# Patient Record
Sex: MALE | Race: WHITE | Employment: OTHER | URBAN - METROPOLITAN AREA
[De-identification: names, ages, dates, MRNs, and addresses within clinical notes are randomized per-mention and may not be internally consistent; named-entity substitution may affect disease eponyms.]

---

## 2017-03-02 ENCOUNTER — TELEPHONE (OUTPATIENT)
Dept: PULMONOLOGY | Facility: CLINIC | Age: 77
End: 2017-03-02

## 2017-03-02 NOTE — TELEPHONE ENCOUNTER
AR saw pt at [facility] for COPD  - told him to f/u in 1 to 2 weeks - Rn calling to schedule - no openings

## 2017-03-02 NOTE — TELEPHONE ENCOUNTER
Sched pt w/ AR on 3/15 @ 4 pm at [facility]. She was given appt time, location, & parking. Informed her that pt should bring outside records. Mary verbalized understanding.

## 2017-03-15 ENCOUNTER — TELEPHONE (OUTPATIENT)
Dept: PULMONOLOGY | Facility: CLINIC | Age: 77
End: 2017-03-15

## 2017-03-15 ENCOUNTER — OFFICE VISIT (OUTPATIENT)
Dept: PULMONOLOGY | Facility: CLINIC | Age: 77
End: 2017-03-15

## 2017-03-15 VITALS
SYSTOLIC BLOOD PRESSURE: 107 MMHG | BODY MASS INDEX: 28.11 KG/M2 | WEIGHT: 221.38 LBS | OXYGEN SATURATION: 93 % | DIASTOLIC BLOOD PRESSURE: 64 MMHG | HEART RATE: 80 BPM | HEIGHT: 74.5 IN | RESPIRATION RATE: 18 BRPM

## 2017-03-15 DIAGNOSIS — J96.11 CHRONIC RESPIRATORY FAILURE WITH HYPOXIA (HCC): ICD-10-CM

## 2017-03-15 DIAGNOSIS — J43.9 PULMONARY EMPHYSEMA, UNSPECIFIED EMPHYSEMA TYPE (HCC): Primary | ICD-10-CM

## 2017-03-15 PROCEDURE — 99214 OFFICE O/P EST MOD 30 MIN: CPT | Performed by: INTERNAL MEDICINE

## 2017-03-15 PROCEDURE — 94761 N-INVAS EAR/PLS OXIMETRY MLT: CPT | Performed by: INTERNAL MEDICINE

## 2017-03-15 PROCEDURE — G0463 HOSPITAL OUTPT CLINIC VISIT: HCPCS | Performed by: INTERNAL MEDICINE

## 2017-03-15 RX ORDER — LOSARTAN POTASSIUM 50 MG/1
TABLET ORAL
Refills: 3 | Status: ON HOLD | COMMUNITY
Start: 2017-02-13 | End: 2018-11-29

## 2017-03-15 RX ORDER — IPRATROPIUM/ALBUTEROL SULFATE 20-100 MCG
MIST INHALER (GRAM) INHALATION
Refills: 3 | COMMUNITY
Start: 2017-01-23

## 2017-03-15 RX ORDER — FLUTICASONE PROPIONATE AND SALMETEROL 50; 500 UG/1; UG/1
POWDER RESPIRATORY (INHALATION)
Refills: 3 | COMMUNITY
Start: 2017-03-08 | End: 2022-01-12 | Stop reason: ALTCHOICE

## 2017-03-15 RX ORDER — ATORVASTATIN CALCIUM 40 MG/1
TABLET, FILM COATED ORAL
Refills: 3 | COMMUNITY
Start: 2017-02-13

## 2017-03-15 RX ORDER — DOFETILIDE 0.25 MG/1
CAPSULE ORAL
Refills: 0 | Status: ON HOLD | COMMUNITY
Start: 2017-03-09 | End: 2018-11-29

## 2017-03-15 NOTE — PROGRESS NOTES
HPI:    Patient ID: Jennifer Smalls is a 69year old male.     HPI  Patient known to me I saw him in [facility] and rehab  He had severe COPD  He underwent rehab after prolonged hospitalization for pacemaker when he had a pneumothorax respiratory failure and h expiration with a few expiratory wheezes  No crackle or rhonchi   Abdominal: Bowel sounds are normal. He exhibits no distension. Musculoskeletal: He exhibits no edema. Lymphadenopathy:     He has no cervical adenopathy.    Neurological: He is oriented t

## 2017-03-27 ENCOUNTER — CARDPULM VISIT (OUTPATIENT)
Dept: CARDIAC REHAB | Facility: HOSPITAL | Age: 77
End: 2017-03-27
Attending: INTERNAL MEDICINE
Payer: MEDICARE

## 2017-03-27 DIAGNOSIS — J43.9 PULMONARY EMPHYSEMA, UNSPECIFIED EMPHYSEMA TYPE (HCC): Primary | ICD-10-CM

## 2017-03-27 DIAGNOSIS — J96.11 CHRONIC RESPIRATORY FAILURE WITH HYPOXIA (HCC): ICD-10-CM

## 2017-03-30 ENCOUNTER — CARDPULM VISIT (OUTPATIENT)
Dept: CARDIAC REHAB | Facility: HOSPITAL | Age: 77
End: 2017-03-30
Attending: INTERNAL MEDICINE
Payer: MEDICARE

## 2017-04-04 ENCOUNTER — CARDPULM VISIT (OUTPATIENT)
Dept: CARDIAC REHAB | Facility: HOSPITAL | Age: 77
End: 2017-04-04
Attending: INTERNAL MEDICINE
Payer: MEDICARE

## 2017-04-06 ENCOUNTER — CARDPULM VISIT (OUTPATIENT)
Dept: CARDIAC REHAB | Facility: HOSPITAL | Age: 77
End: 2017-04-06
Attending: INTERNAL MEDICINE
Payer: MEDICARE

## 2017-04-11 ENCOUNTER — CARDPULM VISIT (OUTPATIENT)
Dept: CARDIAC REHAB | Facility: HOSPITAL | Age: 77
End: 2017-04-11
Attending: INTERNAL MEDICINE
Payer: MEDICARE

## 2017-04-18 ENCOUNTER — CARDPULM VISIT (OUTPATIENT)
Dept: CARDIAC REHAB | Facility: HOSPITAL | Age: 77
End: 2017-04-18
Attending: INTERNAL MEDICINE
Payer: MEDICARE

## 2017-04-20 ENCOUNTER — CARDPULM VISIT (OUTPATIENT)
Dept: CARDIAC REHAB | Facility: HOSPITAL | Age: 77
End: 2017-04-20
Attending: INTERNAL MEDICINE
Payer: MEDICARE

## 2017-04-25 ENCOUNTER — CARDPULM VISIT (OUTPATIENT)
Dept: CARDIAC REHAB | Facility: HOSPITAL | Age: 77
End: 2017-04-25
Attending: INTERNAL MEDICINE
Payer: MEDICARE

## 2017-04-27 ENCOUNTER — CARDPULM VISIT (OUTPATIENT)
Dept: CARDIAC REHAB | Facility: HOSPITAL | Age: 77
End: 2017-04-27
Attending: INTERNAL MEDICINE
Payer: MEDICARE

## 2017-05-02 ENCOUNTER — CARDPULM VISIT (OUTPATIENT)
Dept: CARDIAC REHAB | Facility: HOSPITAL | Age: 77
End: 2017-05-02
Attending: INTERNAL MEDICINE
Payer: MEDICARE

## 2017-05-04 ENCOUNTER — CARDPULM VISIT (OUTPATIENT)
Dept: CARDIAC REHAB | Facility: HOSPITAL | Age: 77
End: 2017-05-04
Attending: INTERNAL MEDICINE
Payer: MEDICARE

## 2017-05-09 ENCOUNTER — CARDPULM VISIT (OUTPATIENT)
Dept: CARDIAC REHAB | Facility: HOSPITAL | Age: 77
End: 2017-05-09
Attending: INTERNAL MEDICINE
Payer: MEDICARE

## 2017-05-11 ENCOUNTER — CARDPULM VISIT (OUTPATIENT)
Dept: CARDIAC REHAB | Facility: HOSPITAL | Age: 77
End: 2017-05-11
Attending: INTERNAL MEDICINE
Payer: MEDICARE

## 2017-05-16 ENCOUNTER — CARDPULM VISIT (OUTPATIENT)
Dept: CARDIAC REHAB | Facility: HOSPITAL | Age: 77
End: 2017-05-16
Attending: INTERNAL MEDICINE
Payer: MEDICARE

## 2017-05-18 ENCOUNTER — CARDPULM VISIT (OUTPATIENT)
Dept: CARDIAC REHAB | Facility: HOSPITAL | Age: 77
End: 2017-05-18
Attending: INTERNAL MEDICINE
Payer: MEDICARE

## 2017-05-23 ENCOUNTER — CARDPULM VISIT (OUTPATIENT)
Dept: CARDIAC REHAB | Facility: HOSPITAL | Age: 77
End: 2017-05-23
Attending: INTERNAL MEDICINE
Payer: MEDICARE

## 2017-05-25 ENCOUNTER — CARDPULM VISIT (OUTPATIENT)
Dept: CARDIAC REHAB | Facility: HOSPITAL | Age: 77
End: 2017-05-25
Attending: INTERNAL MEDICINE
Payer: MEDICARE

## 2017-06-01 ENCOUNTER — CARDPULM VISIT (OUTPATIENT)
Dept: CARDIAC REHAB | Facility: HOSPITAL | Age: 77
End: 2017-06-01
Attending: INTERNAL MEDICINE
Payer: MEDICARE

## 2017-06-06 ENCOUNTER — CARDPULM VISIT (OUTPATIENT)
Dept: CARDIAC REHAB | Facility: HOSPITAL | Age: 77
End: 2017-06-06
Attending: INTERNAL MEDICINE
Payer: MEDICARE

## 2017-06-13 ENCOUNTER — CARDPULM VISIT (OUTPATIENT)
Dept: CARDIAC REHAB | Facility: HOSPITAL | Age: 77
End: 2017-06-13
Attending: INTERNAL MEDICINE
Payer: MEDICARE

## 2017-06-15 ENCOUNTER — CARDPULM VISIT (OUTPATIENT)
Dept: CARDIAC REHAB | Facility: HOSPITAL | Age: 77
End: 2017-06-15
Attending: INTERNAL MEDICINE
Payer: MEDICARE

## 2017-06-20 ENCOUNTER — CARDPULM VISIT (OUTPATIENT)
Dept: CARDIAC REHAB | Facility: HOSPITAL | Age: 77
End: 2017-06-20
Attending: INTERNAL MEDICINE
Payer: MEDICARE

## 2017-06-22 ENCOUNTER — CARDPULM VISIT (OUTPATIENT)
Dept: CARDIAC REHAB | Facility: HOSPITAL | Age: 77
End: 2017-06-22
Attending: INTERNAL MEDICINE
Payer: MEDICARE

## 2017-06-27 ENCOUNTER — CARDPULM VISIT (OUTPATIENT)
Dept: CARDIAC REHAB | Facility: HOSPITAL | Age: 77
End: 2017-06-27
Attending: INTERNAL MEDICINE
Payer: MEDICARE

## 2017-06-29 ENCOUNTER — CARDPULM VISIT (OUTPATIENT)
Dept: CARDIAC REHAB | Facility: HOSPITAL | Age: 77
End: 2017-06-29
Attending: INTERNAL MEDICINE
Payer: MEDICARE

## 2017-07-06 ENCOUNTER — CARDPULM VISIT (OUTPATIENT)
Dept: CARDIAC REHAB | Facility: HOSPITAL | Age: 77
End: 2017-07-06
Attending: INTERNAL MEDICINE
Payer: MEDICARE

## 2017-07-10 ENCOUNTER — OFFICE VISIT (OUTPATIENT)
Dept: PULMONOLOGY | Facility: CLINIC | Age: 77
End: 2017-07-10

## 2017-07-10 VITALS
TEMPERATURE: 98 F | HEART RATE: 89 BPM | SYSTOLIC BLOOD PRESSURE: 96 MMHG | BODY MASS INDEX: 27.08 KG/M2 | OXYGEN SATURATION: 93 % | DIASTOLIC BLOOD PRESSURE: 62 MMHG | WEIGHT: 211 LBS | HEIGHT: 74 IN

## 2017-07-10 DIAGNOSIS — J42 CHRONIC BRONCHITIS, UNSPECIFIED CHRONIC BRONCHITIS TYPE (HCC): Primary | ICD-10-CM

## 2017-07-10 PROCEDURE — G0463 HOSPITAL OUTPT CLINIC VISIT: HCPCS | Performed by: INTERNAL MEDICINE

## 2017-07-10 PROCEDURE — 99213 OFFICE O/P EST LOW 20 MIN: CPT | Performed by: INTERNAL MEDICINE

## 2017-07-10 RX ORDER — ASPIRIN 325 MG
325 TABLET, DELAYED RELEASE (ENTERIC COATED) ORAL
COMMUNITY
Start: 2011-08-12

## 2017-07-10 RX ORDER — CHOLECALCIFEROL (VITAMIN D3) 50 MCG
2000 TABLET ORAL
COMMUNITY

## 2017-07-10 RX ORDER — MONTELUKAST SODIUM 10 MG/1
10 TABLET ORAL
COMMUNITY
Start: 2017-03-07

## 2017-07-10 NOTE — PROGRESS NOTES
HPI:    Patient ID: Shameka Tamayo is a 69year old male.     HPI  Doing well with pulmonary rehab  Better exercise tolerance  No acute cough or sputum or fever or chills  Stable at baseline slightly better  Compliant with inhaler and nebulizers  Review of S 55-pack-year history of smoking )  H/o respiratory failure after pacemaker required hospitalization at [facility] with intubation and vent      Last chest x-ray in February / 2017 COPD no other acute finding     Advair 500/50 one puff twice a day  Nebulizers t

## 2017-07-11 ENCOUNTER — CARDPULM VISIT (OUTPATIENT)
Dept: CARDIAC REHAB | Facility: HOSPITAL | Age: 77
End: 2017-07-11
Attending: INTERNAL MEDICINE
Payer: MEDICARE

## 2017-07-18 ENCOUNTER — CARDPULM VISIT (OUTPATIENT)
Dept: CARDIAC REHAB | Facility: HOSPITAL | Age: 77
End: 2017-07-18
Attending: INTERNAL MEDICINE
Payer: MEDICARE

## 2017-07-20 ENCOUNTER — CARDPULM VISIT (OUTPATIENT)
Dept: CARDIAC REHAB | Facility: HOSPITAL | Age: 77
End: 2017-07-20
Attending: INTERNAL MEDICINE
Payer: MEDICARE

## 2017-07-25 ENCOUNTER — CARDPULM VISIT (OUTPATIENT)
Dept: CARDIAC REHAB | Facility: HOSPITAL | Age: 77
End: 2017-07-25
Attending: INTERNAL MEDICINE
Payer: MEDICARE

## 2017-07-27 ENCOUNTER — CARDPULM VISIT (OUTPATIENT)
Dept: CARDIAC REHAB | Facility: HOSPITAL | Age: 77
End: 2017-07-27
Attending: INTERNAL MEDICINE
Payer: MEDICARE

## 2017-08-01 ENCOUNTER — CARDPULM VISIT (OUTPATIENT)
Dept: CARDIAC REHAB | Facility: HOSPITAL | Age: 77
End: 2017-08-01
Attending: INTERNAL MEDICINE

## 2017-08-03 ENCOUNTER — CARDPULM VISIT (OUTPATIENT)
Dept: CARDIAC REHAB | Facility: HOSPITAL | Age: 77
End: 2017-08-03
Attending: INTERNAL MEDICINE

## 2017-08-10 ENCOUNTER — CARDPULM VISIT (OUTPATIENT)
Dept: CARDIAC REHAB | Facility: HOSPITAL | Age: 77
End: 2017-08-10
Attending: INTERNAL MEDICINE

## 2017-08-15 ENCOUNTER — CARDPULM VISIT (OUTPATIENT)
Dept: CARDIAC REHAB | Facility: HOSPITAL | Age: 77
End: 2017-08-15
Attending: INTERNAL MEDICINE

## 2017-08-17 ENCOUNTER — CARDPULM VISIT (OUTPATIENT)
Dept: CARDIAC REHAB | Facility: HOSPITAL | Age: 77
End: 2017-08-17
Attending: INTERNAL MEDICINE

## 2017-08-22 ENCOUNTER — CARDPULM VISIT (OUTPATIENT)
Dept: CARDIAC REHAB | Facility: HOSPITAL | Age: 77
End: 2017-08-22
Attending: INTERNAL MEDICINE

## 2017-08-22 ENCOUNTER — APPOINTMENT (OUTPATIENT)
Dept: CARDIAC REHAB | Facility: HOSPITAL | Age: 77
End: 2017-08-22
Attending: INTERNAL MEDICINE

## 2017-08-24 ENCOUNTER — APPOINTMENT (OUTPATIENT)
Dept: CARDIAC REHAB | Facility: HOSPITAL | Age: 77
End: 2017-08-24
Attending: INTERNAL MEDICINE

## 2017-08-29 ENCOUNTER — APPOINTMENT (OUTPATIENT)
Dept: CARDIAC REHAB | Facility: HOSPITAL | Age: 77
End: 2017-08-29
Attending: INTERNAL MEDICINE

## 2017-10-17 ENCOUNTER — CARDPULM VISIT (OUTPATIENT)
Dept: CARDIAC REHAB | Facility: HOSPITAL | Age: 77
End: 2017-10-17
Attending: INTERNAL MEDICINE

## 2018-01-08 ENCOUNTER — OFFICE VISIT (OUTPATIENT)
Dept: PULMONOLOGY | Facility: CLINIC | Age: 78
End: 2018-01-08

## 2018-01-08 VITALS
BODY MASS INDEX: 27.34 KG/M2 | DIASTOLIC BLOOD PRESSURE: 70 MMHG | HEART RATE: 89 BPM | SYSTOLIC BLOOD PRESSURE: 120 MMHG | RESPIRATION RATE: 18 BRPM | OXYGEN SATURATION: 89 % | WEIGHT: 213 LBS | HEIGHT: 74 IN

## 2018-01-08 DIAGNOSIS — J42 CHRONIC BRONCHITIS, UNSPECIFIED CHRONIC BRONCHITIS TYPE (HCC): Primary | ICD-10-CM

## 2018-01-08 DIAGNOSIS — J96.11 CHRONIC RESPIRATORY FAILURE WITH HYPOXIA (HCC): ICD-10-CM

## 2018-01-08 PROCEDURE — 99214 OFFICE O/P EST MOD 30 MIN: CPT | Performed by: INTERNAL MEDICINE

## 2018-01-08 PROCEDURE — G0463 HOSPITAL OUTPT CLINIC VISIT: HCPCS | Performed by: INTERNAL MEDICINE

## 2018-01-08 NOTE — PROGRESS NOTES
HPI:    Patient ID: Kunal Malcolm is a 69year old male.     HPI  Breathing about the same   Dyspnea  on exertion  Compliant with inhaler and nebulizers  No active cough or sputum production  No fever or chills  No hemoptysis  Stable weight  Still with acce (hcc)  (primary encounter diagnosis)  Chronic respiratory failure with hypoxia (hcc)    1–  severe COPD  / FEV1 less than 35%  Stable now /no evidence of acute exacerbation  Had flu shot September       Quit smoking 7 years  ( 55-pack-year history of smoki

## 2018-01-11 ENCOUNTER — CARDPULM VISIT (OUTPATIENT)
Dept: CARDIAC REHAB | Facility: HOSPITAL | Age: 78
End: 2018-01-11
Attending: INTERNAL MEDICINE

## 2018-03-22 ENCOUNTER — CARDPULM VISIT (OUTPATIENT)
Dept: CARDIAC REHAB | Facility: HOSPITAL | Age: 78
End: 2018-03-22
Attending: INTERNAL MEDICINE

## 2018-07-09 ENCOUNTER — OFFICE VISIT (OUTPATIENT)
Dept: PULMONOLOGY | Facility: CLINIC | Age: 78
End: 2018-07-09

## 2018-07-09 VITALS
WEIGHT: 218.81 LBS | SYSTOLIC BLOOD PRESSURE: 114 MMHG | BODY MASS INDEX: 27.78 KG/M2 | HEART RATE: 89 BPM | RESPIRATION RATE: 18 BRPM | OXYGEN SATURATION: 92 % | HEIGHT: 74.5 IN | DIASTOLIC BLOOD PRESSURE: 69 MMHG

## 2018-07-09 DIAGNOSIS — J44.1 CHRONIC OBSTRUCTIVE PULMONARY DISEASE WITH ACUTE EXACERBATION (HCC): Primary | ICD-10-CM

## 2018-07-09 DIAGNOSIS — J96.11 CHRONIC RESPIRATORY FAILURE WITH HYPOXIA (HCC): ICD-10-CM

## 2018-07-09 PROCEDURE — 99214 OFFICE O/P EST MOD 30 MIN: CPT | Performed by: INTERNAL MEDICINE

## 2018-07-09 PROCEDURE — G0463 HOSPITAL OUTPT CLINIC VISIT: HCPCS | Performed by: INTERNAL MEDICINE

## 2018-07-09 RX ORDER — PREDNISONE 10 MG/1
TABLET ORAL
Qty: 18 TABLET | Refills: 0 | Status: SHIPPED | OUTPATIENT
Start: 2018-07-09 | End: 2018-10-10 | Stop reason: ALTCHOICE

## 2018-07-09 NOTE — PROGRESS NOTES
HPI:    Patient ID: Kami Wayne is a 69year old male.     HPI  Slight increase shortness of breath from baseline with the change in of the weather and humidity  Dyspnea and exertion with less than 1 block  No cough or sputum  No fever or chills  No [place] Pulmonary/Chest: He has wheezes. He has no rales. He exhibits no tenderness. Abdominal: Bowel sounds are normal.   Musculoskeletal: He exhibits no edema or tenderness. Neurological: He is oriented to person, place, and time.               ASSESSMENT/P

## 2018-07-12 ENCOUNTER — CARDPULM VISIT (OUTPATIENT)
Dept: CARDIAC REHAB | Facility: HOSPITAL | Age: 78
End: 2018-07-12

## 2018-08-23 ENCOUNTER — APPOINTMENT (OUTPATIENT)
Dept: CARDIAC REHAB | Facility: HOSPITAL | Age: 78
End: 2018-08-23

## 2018-08-25 ENCOUNTER — TELEPHONE (OUTPATIENT)
Dept: HEMATOLOGY/ONCOLOGY | Facility: HOSPITAL | Age: 78
End: 2018-08-25

## 2018-10-10 ENCOUNTER — OFFICE VISIT (OUTPATIENT)
Dept: PULMONOLOGY | Facility: CLINIC | Age: 78
End: 2018-10-10
Payer: MEDICARE

## 2018-10-10 VITALS
BODY MASS INDEX: 27.4 KG/M2 | DIASTOLIC BLOOD PRESSURE: 75 MMHG | HEART RATE: 79 BPM | OXYGEN SATURATION: 91 % | WEIGHT: 215.81 LBS | SYSTOLIC BLOOD PRESSURE: 122 MMHG | RESPIRATION RATE: 18 BRPM | HEIGHT: 74.5 IN

## 2018-10-10 DIAGNOSIS — J42 CHRONIC BRONCHITIS, UNSPECIFIED CHRONIC BRONCHITIS TYPE (HCC): Primary | ICD-10-CM

## 2018-10-10 PROCEDURE — G0463 HOSPITAL OUTPT CLINIC VISIT: HCPCS | Performed by: INTERNAL MEDICINE

## 2018-10-10 PROCEDURE — 99213 OFFICE O/P EST LOW 20 MIN: CPT | Performed by: INTERNAL MEDICINE

## 2018-10-10 NOTE — PROGRESS NOTES
HPI:    Patient ID: Dileep Downey is a 69year old male. HPI    Review of Systems   Constitutional: Negative for fatigue, fever and unexpected weight change. Respiratory: Positive for shortness of breath and wheezing. Negative for apnea and cough. bronchitis type (hcc)  (primary encounter diagnosis)    1–   very severe COPD  / FEV1 less than 35% with chronic hypoxemic respiratory failure :  Stable now   No evidence of exacerbation now         Quit smoking 7 years  ( 55-pack-year history of smoking )

## 2018-10-25 ENCOUNTER — CARDPULM VISIT (OUTPATIENT)
Dept: CARDIAC REHAB | Facility: HOSPITAL | Age: 78
End: 2018-10-25

## 2018-11-19 ENCOUNTER — ANESTHESIA (OUTPATIENT)
Dept: MEDSURG UNIT | Facility: HOSPITAL | Age: 78
End: 2018-11-19

## 2018-11-19 ENCOUNTER — APPOINTMENT (OUTPATIENT)
Dept: CV DIAGNOSTICS | Facility: HOSPITAL | Age: 78
DRG: 208 | End: 2018-11-19
Attending: HOSPITALIST
Payer: MEDICARE

## 2018-11-19 ENCOUNTER — HOSPITAL ENCOUNTER (INPATIENT)
Facility: HOSPITAL | Age: 78
LOS: 10 days | Discharge: NURSING, REHAB OR LONG-TERM CARE | DRG: 208 | End: 2018-11-29
Attending: EMERGENCY MEDICINE | Admitting: INTERNAL MEDICINE
Payer: MEDICARE

## 2018-11-19 ENCOUNTER — APPOINTMENT (OUTPATIENT)
Dept: GENERAL RADIOLOGY | Facility: HOSPITAL | Age: 78
DRG: 208 | End: 2018-11-19
Attending: EMERGENCY MEDICINE
Payer: MEDICARE

## 2018-11-19 ENCOUNTER — ANESTHESIA EVENT (OUTPATIENT)
Dept: MEDSURG UNIT | Facility: HOSPITAL | Age: 78
End: 2018-11-19

## 2018-11-19 ENCOUNTER — APPOINTMENT (OUTPATIENT)
Dept: GENERAL RADIOLOGY | Facility: HOSPITAL | Age: 78
DRG: 208 | End: 2018-11-19
Attending: INTERNAL MEDICINE
Payer: MEDICARE

## 2018-11-19 DIAGNOSIS — J18.9 PNEUMONITIS: ICD-10-CM

## 2018-11-19 DIAGNOSIS — J44.1 COPD EXACERBATION (HCC): ICD-10-CM

## 2018-11-19 DIAGNOSIS — R06.89 HYPERCAPNIA: ICD-10-CM

## 2018-11-19 DIAGNOSIS — N17.9 AKI (ACUTE KIDNEY INJURY) (HCC): ICD-10-CM

## 2018-11-19 DIAGNOSIS — I21.4 NSTEMI (NON-ST ELEVATED MYOCARDIAL INFARCTION) (HCC): Primary | ICD-10-CM

## 2018-11-19 DIAGNOSIS — I50.9 HEART FAILURE, UNSPECIFIED HF CHRONICITY, UNSPECIFIED HEART FAILURE TYPE (HCC): ICD-10-CM

## 2018-11-19 PROBLEM — J98.4 PNEUMONITIS: Status: ACTIVE | Noted: 2018-11-19

## 2018-11-19 PROCEDURE — 71045 X-RAY EXAM CHEST 1 VIEW: CPT | Performed by: EMERGENCY MEDICINE

## 2018-11-19 PROCEDURE — 0BH17EZ INSERTION OF ENDOTRACHEAL AIRWAY INTO TRACHEA, VIA NATURAL OR ARTIFICIAL OPENING: ICD-10-PCS | Performed by: HOSPITALIST

## 2018-11-19 PROCEDURE — 93306 TTE W/DOPPLER COMPLETE: CPT | Performed by: HOSPITALIST

## 2018-11-19 PROCEDURE — 99223 1ST HOSP IP/OBS HIGH 75: CPT | Performed by: INTERNAL MEDICINE

## 2018-11-19 PROCEDURE — 5A1945Z RESPIRATORY VENTILATION, 24-96 CONSECUTIVE HOURS: ICD-10-PCS | Performed by: HOSPITALIST

## 2018-11-19 PROCEDURE — 71045 X-RAY EXAM CHEST 1 VIEW: CPT | Performed by: INTERNAL MEDICINE

## 2018-11-19 PROCEDURE — 99223 1ST HOSP IP/OBS HIGH 75: CPT | Performed by: HOSPITALIST

## 2018-11-19 RX ORDER — FUROSEMIDE 10 MG/ML
40 INJECTION INTRAMUSCULAR; INTRAVENOUS 2 TIMES DAILY
Status: COMPLETED | OUTPATIENT
Start: 2018-11-19 | End: 2018-11-19

## 2018-11-19 RX ORDER — ALBUTEROL SULFATE 2.5 MG/3ML
2.5 SOLUTION RESPIRATORY (INHALATION)
Status: DISCONTINUED | OUTPATIENT
Start: 2018-11-19 | End: 2018-11-29

## 2018-11-19 RX ORDER — FUROSEMIDE 10 MG/ML
40 INJECTION INTRAMUSCULAR; INTRAVENOUS ONCE
Status: COMPLETED | OUTPATIENT
Start: 2018-11-19 | End: 2018-11-19

## 2018-11-19 RX ORDER — MORPHINE SULFATE 4 MG/ML
4 INJECTION, SOLUTION INTRAMUSCULAR; INTRAVENOUS EVERY 2 HOUR PRN
Status: DISCONTINUED | OUTPATIENT
Start: 2018-11-19 | End: 2018-11-29

## 2018-11-19 RX ORDER — METHYLPREDNISOLONE SODIUM SUCCINATE 125 MG/2ML
60 INJECTION, POWDER, LYOPHILIZED, FOR SOLUTION INTRAMUSCULAR; INTRAVENOUS ONCE
Status: COMPLETED | OUTPATIENT
Start: 2018-11-19 | End: 2018-11-19

## 2018-11-19 RX ORDER — ACETAMINOPHEN 325 MG/1
650 TABLET ORAL EVERY 6 HOURS PRN
Status: DISCONTINUED | OUTPATIENT
Start: 2018-11-19 | End: 2018-11-29

## 2018-11-19 RX ORDER — HEPARIN SODIUM 5000 [USP'U]/ML
5000 INJECTION, SOLUTION INTRAVENOUS; SUBCUTANEOUS EVERY 12 HOURS SCHEDULED
Status: DISCONTINUED | OUTPATIENT
Start: 2018-11-19 | End: 2018-11-29

## 2018-11-19 RX ORDER — AZITHROMYCIN 250 MG/1
500 TABLET, FILM COATED ORAL ONCE
Status: COMPLETED | OUTPATIENT
Start: 2018-11-19 | End: 2018-11-19

## 2018-11-19 RX ORDER — NITROGLYCERIN 0.4 MG/1
0.4 TABLET SUBLINGUAL EVERY 5 MIN PRN
Status: DISCONTINUED | OUTPATIENT
Start: 2018-11-19 | End: 2018-11-29

## 2018-11-19 RX ORDER — ONDANSETRON 2 MG/ML
4 INJECTION INTRAMUSCULAR; INTRAVENOUS EVERY 6 HOURS PRN
Status: DISCONTINUED | OUTPATIENT
Start: 2018-11-19 | End: 2018-11-29

## 2018-11-19 RX ORDER — MORPHINE SULFATE 2 MG/ML
2 INJECTION, SOLUTION INTRAMUSCULAR; INTRAVENOUS EVERY 2 HOUR PRN
Status: DISCONTINUED | OUTPATIENT
Start: 2018-11-19 | End: 2018-11-29

## 2018-11-19 RX ORDER — METHYLPREDNISOLONE SODIUM SUCCINATE 40 MG/ML
40 INJECTION, POWDER, LYOPHILIZED, FOR SOLUTION INTRAMUSCULAR; INTRAVENOUS EVERY 6 HOURS
Status: DISCONTINUED | OUTPATIENT
Start: 2018-11-19 | End: 2018-11-20

## 2018-11-19 RX ORDER — MORPHINE SULFATE 2 MG/ML
1 INJECTION, SOLUTION INTRAMUSCULAR; INTRAVENOUS EVERY 2 HOUR PRN
Status: DISCONTINUED | OUTPATIENT
Start: 2018-11-19 | End: 2018-11-29

## 2018-11-19 RX ORDER — BISACODYL 10 MG
10 SUPPOSITORY, RECTAL RECTAL
Status: DISCONTINUED | OUTPATIENT
Start: 2018-11-19 | End: 2018-11-29

## 2018-11-19 RX ORDER — ASPIRIN 325 MG
325 TABLET ORAL DAILY
Status: DISCONTINUED | OUTPATIENT
Start: 2018-11-19 | End: 2018-11-29

## 2018-11-19 RX ORDER — IPRATROPIUM BROMIDE AND ALBUTEROL SULFATE 2.5; .5 MG/3ML; MG/3ML
3 SOLUTION RESPIRATORY (INHALATION)
Status: DISCONTINUED | OUTPATIENT
Start: 2018-11-19 | End: 2018-11-21

## 2018-11-19 RX ORDER — CHLORHEXIDINE GLUCONATE 0.12 MG/ML
15 RINSE ORAL
Status: DISCONTINUED | OUTPATIENT
Start: 2018-11-19 | End: 2018-11-22

## 2018-11-19 RX ORDER — SODIUM CHLORIDE 0.9 % (FLUSH) 0.9 %
10 SYRINGE (ML) INJECTION AS NEEDED
Status: DISCONTINUED | OUTPATIENT
Start: 2018-11-19 | End: 2018-11-29

## 2018-11-19 RX ORDER — SODIUM CHLORIDE 0.9 % (FLUSH) 0.9 %
3 SYRINGE (ML) INJECTION AS NEEDED
Status: DISCONTINUED | OUTPATIENT
Start: 2018-11-19 | End: 2018-11-29

## 2018-11-19 RX ORDER — POLYETHYLENE GLYCOL 3350 17 G/17G
17 POWDER, FOR SOLUTION ORAL DAILY PRN
Status: DISCONTINUED | OUTPATIENT
Start: 2018-11-19 | End: 2018-11-29

## 2018-11-19 RX ORDER — SODIUM CHLORIDE 9 MG/ML
INJECTION, SOLUTION INTRAVENOUS
Status: COMPLETED
Start: 2018-11-19 | End: 2018-11-19

## 2018-11-19 RX ORDER — IPRATROPIUM BROMIDE AND ALBUTEROL SULFATE 2.5; .5 MG/3ML; MG/3ML
3 SOLUTION RESPIRATORY (INHALATION) ONCE
Status: COMPLETED | OUTPATIENT
Start: 2018-11-19 | End: 2018-11-19

## 2018-11-19 RX ORDER — ASPIRIN 81 MG/1
324 TABLET, CHEWABLE ORAL ONCE
Status: COMPLETED | OUTPATIENT
Start: 2018-11-19 | End: 2018-11-19

## 2018-11-19 NOTE — ED INITIAL ASSESSMENT (HPI)
Pt brought in by [facility] from [facility] with c/o shortness of breath, per report by EMS pt was at [address] [facility] when Nurse got an 80 % oxygen saturation, per EMS they started pt on oxygen at 4L/min by nasal cannula Os sat up to 97 %, pt was give

## 2018-11-19 NOTE — PROGRESS NOTES
[facility] Pharmacy Note: Antimicrobial Weight Dose Adjustment for: piperacillin/tazobactam (Chloe Jeffrey)    Jennifer Smalls is a 69year old male who has been prescribed piperacillin/tazobactam (ZOSYN) 3.375 g IVPB now. Pt has a weight of 113kg.   Based on this criteria

## 2018-11-19 NOTE — ED PROVIDER NOTES
Patient Seen in: [facility] AND [facility] Emergency Department    History   Patient presents with:  Dyspnea LAITH SOB (respiratory)    Stated Complaint: SOB    HPI    76-year-old male with severe COPD, hyperlipidemia presents for evaluation of shortness of breath 92%   BMI 32.10 kg/m²         Physical Exam   Constitutional: He is oriented to person, place, and time. He appears well-developed and well-nourished. No distress. Appears ill, nontoxic   HENT:   Head: Normocephalic and atraumatic.    Mouth/Throat: Chloé Catching WBC 16.8 (*)     RBC 3.60 (*)     HGB 11.5 (*)     HCT 35.0 (*)     Neutrophil Absolute 14.7 (*)     Monocyte Absolute 1.1 (*)     All other components within normal limits   LACTIC ACID, PLASMA - Normal   CBC WITH DIFFERENTIAL WITH PLATELET    Narrative: disposition: 11/19/2018  1:01 PM         Review of labs from [facility] shows creatinine on 5/21/18 was 1.44, today is elevated in the threes.   I suspect this is related to heart failure, though patient does appear to have a mixed COPD versus heart failure pic

## 2018-11-19 NOTE — CONSULTS
[facility] HOSP - [facility]    Report of Cardiology Consultation    Mally Winkler Patient Status:  Inpatient    1940 MRN [id number]   Location [facility] 2W/SW Attending Rekha Nicole MD   Hosp Day # 0 PCP Marino Quintana MD     Date of Adm coronary artery disease status post PCI stent.   He had a dual chamber medtronic right sided ICD defibrillator, and a history of pafib    Past Medical History  Past Medical History:   Diagnosis Date   • COPD (chronic obstructive pulmonary disease) ([facility] [address] Activated Inhale 1 puff into the lungs daily. aspirin  MG Oral Tab EC Take 325 mg by mouth. Cholecalciferol (VITAMIN D) 2000 units Oral Tab Take 2,000 tablets by mouth. Montelukast Sodium 10 MG Oral Tab Take 10 mg by mouth.    PROAIR  (90 11/19/2018    HCT 35.0 (L) 11/19/2018     11/19/2018    CREATSERUM 3.44 (H) 11/19/2018    BUN 54 (H) 11/19/2018     (L) 11/19/2018    K 4.6 11/19/2018    CL 93 (L) 11/19/2018    CO2 27 11/19/2018     (H) 11/19/2018    CA 8.8 11/19/2018

## 2018-11-19 NOTE — CONSULTS
[facility]    Report of Consultation    Tanmay Real Patient Status:  Inpatient    1940 MRN [id number]   Location [facility] 2W/SW Attending Bradly Orona MD   Hosp Day # 0 PCP Soy Wheeler MD     Date of Admission:  6 No      Alcohol/week: 0.0 oz    Drug use: No         Current Medications:    Current Facility-Administered Medications:  aspirin tab 325 mg 325 mg Oral Daily   nitroGLYCERIN (NITROSTAT) SL tab 0.4 mg 0.4 mg Sublingual Q5 Min PRN   albuterol sulfate (VENTOL MCG Oral Cap TK ONE C PO  Q 12 H   Losartan Potassium 50 MG Oral Tab TK 1 T PO QD       Allergies  No Known Allergies    Review of Systems:    Pertinent items are noted in HPI.     Physical Exam:   Blood pressure 124/70, pulse 73, temperature 98 °F (36.7 °C with acute exacerbation   Steroid / neb / antibiotics / O2     3- acute pneumonia     4- elevated troponin   Elevated troponin   High BNP   Prior h/o CAD   Cardiology consult     5- CHF     6- obesity with likely HÉCTOR   Empiric bipap     7- DVT prophylaxis

## 2018-11-19 NOTE — PROGRESS NOTES
[facility] Pharmacy Note:  Renal Adjustment for Zosyn    Marie Ashford is a 69year old male who has been prescribed Zosyn 3.375g every 8 hrs. CrCl is estimated creatinine clearance is 20.9 mL/min (A) (based on SCr of 3.44 mg/dL (H)).  so the dose has been adjust

## 2018-11-19 NOTE — H&P
[facility]    PATIENT'S NAME: Armin Gonsaleset   ATTENDING PHYSICIAN: Niraj Hernandez MD   PATIENT ACCOUNT#:   [id number]    LOCATION:  [place] [address]  MEDICAL RECORD #:   [id number]       YOB: 1940  ADMISSION DATE:       11/19/201 cerebrovascular accident. Mother had heart failure. SOCIAL HISTORY:  Extensive tobacco use by history. Lives by himself. No alcohol or drug use. Usually independent in his basic activities of daily living.     REVIEW OF SYSTEMS:  The patient is a shilo acute on chronic renal insufficiency. Monitor his kidney function closely. Further recommendations to follow.     Dictated By Haris Monroe MD  d: 11/19/2018 13:26:34  t: 11/19/2018 13:49:37  [facility] [id number]  FB/

## 2018-11-19 NOTE — RESPIRATORY THERAPY NOTE
RESPIRATORY THERAPY PATIENT TRANSPORT NOTE    Transported from: Er-48  Transported to: 224    Bi-level support used during transport:yes

## 2018-11-20 ENCOUNTER — APPOINTMENT (OUTPATIENT)
Dept: ULTRASOUND IMAGING | Facility: HOSPITAL | Age: 78
DRG: 208 | End: 2018-11-20
Attending: INTERNAL MEDICINE
Payer: MEDICARE

## 2018-11-20 ENCOUNTER — APPOINTMENT (OUTPATIENT)
Dept: GENERAL RADIOLOGY | Facility: HOSPITAL | Age: 78
DRG: 208 | End: 2018-11-20
Attending: INTERNAL MEDICINE
Payer: MEDICARE

## 2018-11-20 ENCOUNTER — APPOINTMENT (OUTPATIENT)
Dept: PICC SERVICES | Facility: HOSPITAL | Age: 78
DRG: 208 | End: 2018-11-20
Attending: INTERNAL MEDICINE
Payer: MEDICARE

## 2018-11-20 PROCEDURE — 71045 X-RAY EXAM CHEST 1 VIEW: CPT | Performed by: INTERNAL MEDICINE

## 2018-11-20 PROCEDURE — 02HV33Z INSERTION OF INFUSION DEVICE INTO SUPERIOR VENA CAVA, PERCUTANEOUS APPROACH: ICD-10-PCS | Performed by: HOSPITALIST

## 2018-11-20 PROCEDURE — 99291 CRITICAL CARE FIRST HOUR: CPT | Performed by: INTERNAL MEDICINE

## 2018-11-20 PROCEDURE — B5181ZA FLUOROSCOPY OF SUPERIOR VENA CAVA USING LOW OSMOLAR CONTRAST, GUIDANCE: ICD-10-PCS | Performed by: HOSPITALIST

## 2018-11-20 PROCEDURE — 76770 US EXAM ABDO BACK WALL COMP: CPT | Performed by: INTERNAL MEDICINE

## 2018-11-20 RX ORDER — SODIUM CHLORIDE 9 MG/ML
INJECTION, SOLUTION INTRAVENOUS
Status: DISPENSED
Start: 2018-11-20 | End: 2018-11-21

## 2018-11-20 RX ORDER — SODIUM CHLORIDE 9 MG/ML
INJECTION, SOLUTION INTRAVENOUS CONTINUOUS
Status: DISCONTINUED | OUTPATIENT
Start: 2018-11-20 | End: 2018-11-22

## 2018-11-20 RX ORDER — 0.9 % SODIUM CHLORIDE 0.9 %
VIAL (ML) INJECTION
Status: DISPENSED
Start: 2018-11-20 | End: 2018-11-21

## 2018-11-20 RX ORDER — SODIUM CHLORIDE 9 MG/ML
INJECTION, SOLUTION INTRAVENOUS
Status: COMPLETED
Start: 2018-11-20 | End: 2018-11-20

## 2018-11-20 RX ORDER — METHYLPREDNISOLONE SODIUM SUCCINATE 40 MG/ML
40 INJECTION, POWDER, LYOPHILIZED, FOR SOLUTION INTRAMUSCULAR; INTRAVENOUS EVERY 12 HOURS
Status: DISCONTINUED | OUTPATIENT
Start: 2018-11-20 | End: 2018-11-22

## 2018-11-20 RX ORDER — FAMOTIDINE 10 MG/ML
20 INJECTION, SOLUTION INTRAVENOUS DAILY
Status: DISCONTINUED | OUTPATIENT
Start: 2018-11-20 | End: 2018-11-23

## 2018-11-20 RX ORDER — MAGNESIUM OXIDE 400 MG (241.3 MG MAGNESIUM) TABLET
400 TABLET ONCE
Status: COMPLETED | OUTPATIENT
Start: 2018-11-20 | End: 2018-11-20

## 2018-11-20 RX ORDER — SODIUM CHLORIDE 0.9 % (FLUSH) 0.9 %
10 SYRINGE (ML) INJECTION AS NEEDED
Status: DISCONTINUED | OUTPATIENT
Start: 2018-11-20 | End: 2018-11-29

## 2018-11-20 RX ORDER — DEXTROSE MONOHYDRATE 25 G/50ML
50 INJECTION, SOLUTION INTRAVENOUS AS NEEDED
Status: DISCONTINUED | OUTPATIENT
Start: 2018-11-20 | End: 2018-11-29

## 2018-11-20 RX ORDER — LIDOCAINE HYDROCHLORIDE 10 MG/ML
0.5 INJECTION, SOLUTION INFILTRATION; PERINEURAL ONCE AS NEEDED
Status: ACTIVE | OUTPATIENT
Start: 2018-11-20 | End: 2018-11-20

## 2018-11-20 NOTE — PROGRESS NOTES
[facility] Pharmacy Note:  Renal Dose Adjustment    Cassie Heart has been prescribed famotidine (PEPCID) 20 mg intravenously every 12 hours. Estimated Creatinine Clearance: 22.4 mL/min (A) (based on SCr of 3.21 mg/dL (H)).     His calculated creatinine clearan

## 2018-11-20 NOTE — DIETARY NOTE
ADULT NUTRITION INITIAL ASSESSMENT    Pt is at high nutrition risk. Pt does not meet malnutrition criteria. RECOMMENDATIONS TO MD:  See Nutrition Intervention.  MD may taper IVF accordingly as tube feeds advance to goal 70 ml/hr providing ~1475 ml H20 providers and discussed in Care Rounds   - Discharge and transfer of nutrition care to new setting or provider: monitor plans        PERTINENT PAST MEDICAL HISTORY:   Past Medical History:   Diagnosis Date   • COPD (chronic obstructive pulmonary disease) ( Monitor k+ closely for need to adjust EN formula.  .Appropriate labs entered for am.   Recent Labs      11/19/18   1029  11/20/18   0442   GLU  123*  218*   BUN  54*  61*   CREATSERUM  3.44*  3.21*   CA  8.8  7.8*   MG   --   1.6*   NA  132*  133*   K  4.6

## 2018-11-20 NOTE — CONSULTS
[facility]    Report of Consultation    Riley Alexandermike Patient Status:  Inpatient    1940 MRN [id number]   Location [facility] 2W/SW Attending Treva Zhao MD   Hosp Day # 1 PCP Isabell Weir MD     Date of Admission:  6 (ZOSYN) 4.5 g in dextrose 5 % 100 mL ADD-vantage 4.5 g Intravenous Q12H   Normal Saline Flush 0.9 % injection 3 mL 3 mL Intravenous PRN   Heparin Sodium (Porcine) 5000 UNIT/ML injection 5,000 Units 5,000 Units Subcutaneous 2 times per day   acetaminophen ( TK 1 T PO  D   ADVAIR DISKUS 500-50 MCG/DOSE Inhalation Aerosol Powder, Breath Activated INHALE 1 PUFF INTO THE LUNGS Q 12 H   COMBIVENT RESPIMAT  MCG/ACT Inhalation Aero Soln INL 1 PUFF PO INTO THE LUNGS QID   dofetilide 250 MCG Oral Cap TK ONE C PO (cpt=71045)    Result Date: 11/19/2018  CONCLUSION:  1. Successful placement of endotracheal and nasogastric tubes. 2. Mild bilateral pulmonary opacification.      Dictated by (CST): Maurice Hooks MD on 11/19/2018 at 20:27     Approved by (CST): P

## 2018-11-20 NOTE — ANESTHESIA PROCEDURE NOTES
ANESTHESIA INTUBATION  Urgency: emergent      General Information and Staff    Patient location during procedure: ICU  Anesthesiologist: Ilana Hdez MD  Performed: anesthesiologist     Consent for Airway (if performed for an anesthetic, see related d

## 2018-11-20 NOTE — CM/SW NOTE
11/20: Received MDO for home health evaluation. Patient admitted with respiratory failure, he is currently intubated and on a ventilator. SW to f/u when appropriate. Addendum 11/23/2018: Met with patient and 2 sisters Ryan Ledesma.  Per patient,

## 2018-11-20 NOTE — PLAN OF CARE
Problem: CARDIOVASCULAR - ADULT  Goal: Maintains optimal cardiac output and hemodynamic stability  INTERVENTIONS:  - Monitor vital signs, rhythm, and trends  - Monitor for bleeding, hypotension and signs of decreased cardiac output  - Evaluate effectivenes dependent on mechanical vent. No acute respiratory distress observed from pt, frequent suctioning done as pt has large amounts of thick tan sputum.     Problem: GASTROINTESTINAL - ADULT  Goal: Maintains adequate nutritional intake (undernourished)  INTERVEN

## 2018-11-20 NOTE — PROGRESS NOTES
At approximately 1800 Patient noted to be increasingly drowsy. MD's notified and ABG's were ordered. Labs were drawn and Dr Francine Arceo was notified of an increasing PCO2 level.  At approximately 7pm Dr Francine Arceo asked the RN  to contact anesthesia and have the patie

## 2018-11-20 NOTE — PROGRESS NOTES
[facility]    Progress Note    Kami Wayne Patient Status:  Inpatient    1940 MRN [id number]   Location [facility] 2W/SW Attending Chris Hewitt MD   Hosp Day # 1 PCP Paula Kumar MD        Subjective:   Subjective: HGB 10.4 (L) 11/20/2018    HCT 31.2 (L) 11/20/2018     11/20/2018    CREATSERUM 3.21 (H) 11/20/2018    BUN 61 (H) 11/20/2018     (L) 11/20/2018    K 4.3 11/20/2018    CL 92 (L) 11/20/2018    CO2 30 11/20/2018     (H) 11/20/2018    CA -------------------------- Sinus Rhythm -Prominent R(V1) and left axis -nonspecific -Seen with pulmonary disease -possible anterior fascicular block. - Nonspecific T-abnormality.  ABNORMAL No previous ECGs available Electronically signed on 11/19/2018 at 1

## 2018-11-20 NOTE — PROGRESS NOTES
[facility] HOSP - [facility]    Progress Note    Kriss Cook Patient Status:  Inpatient    1940 MRN [id number]   Location [facility] 2W/SW Attending Sandra Cho MD   Hosp Day # 1 PCP Sanna Jaramillo MD       Assessment and Plan:      1. Neck:supple,no JVD  Pulm: Lungs clear but diminished anteriorly, ventilated  CV: Heart with regular rate and rhythm, nl S1,S2 ,no murmur  Abd: Abdomen soft, nontender, nondistended,   Ext:  no clubbing, no cyanosis,no edema, SCDs  Neuro: no focal deficit endotracheal and nasogastric tubes. 2. Mild bilateral pulmonary opacification.      Dictated by (CST): Raeann Hooks MD on 11/19/2018 at 20:27     Approved by (CST): Raeann Hooks MD on 11/19/2018 at 20:29          Xr Chest Ap Portable

## 2018-11-20 NOTE — PROGRESS NOTES
Vascular Access Note  Inserted by Salo Minor RN    Vascular Access Screening:   Allergies to Lidocaine: no  Allergies to Latex: no  Presence of Pacemaker/Defibrillator: Right Side  Mastectomy with Lymph Node Dissection: No  AV Fistula / AV Graft: No  Dialysis

## 2018-11-20 NOTE — PLAN OF CARE
Problem: Patient Centered Care  Goal: Patient preferences are identified and integrated in the patient's plan of care  Interventions:  - What would you like us to know as we care for you?  Pt lives at home alone, sister checks in on him  - Provide timely, c vital signs, rhythm, and trends  - Monitor for bleeding, hypotension and signs of decreased cardiac output  - Evaluate effectiveness of vasoactive medications to optimize hemodynamic stability  - Monitor arterial and/or venous puncture sites for bleeding a wheezes. Receiving nebulizer treatments.      Problem: GASTROINTESTINAL - ADULT  Goal: Maintains adequate nutritional intake (undernourished)  INTERVENTIONS:  - Monitor percentage of each meal consumed  - Identify factors contributing to decreased intake, t

## 2018-11-21 ENCOUNTER — APPOINTMENT (OUTPATIENT)
Dept: GENERAL RADIOLOGY | Facility: HOSPITAL | Age: 78
DRG: 208 | End: 2018-11-21
Attending: INTERNAL MEDICINE
Payer: MEDICARE

## 2018-11-21 PROCEDURE — 71045 X-RAY EXAM CHEST 1 VIEW: CPT | Performed by: INTERNAL MEDICINE

## 2018-11-21 PROCEDURE — 99291 CRITICAL CARE FIRST HOUR: CPT | Performed by: INTERNAL MEDICINE

## 2018-11-21 RX ORDER — MAGNESIUM SULFATE HEPTAHYDRATE 40 MG/ML
2 INJECTION, SOLUTION INTRAVENOUS ONCE
Status: COMPLETED | OUTPATIENT
Start: 2018-11-21 | End: 2018-11-21

## 2018-11-21 RX ORDER — IPRATROPIUM BROMIDE AND ALBUTEROL SULFATE 2.5; .5 MG/3ML; MG/3ML
3 SOLUTION RESPIRATORY (INHALATION)
Status: DISCONTINUED | OUTPATIENT
Start: 2018-11-21 | End: 2018-11-27

## 2018-11-21 NOTE — PROGRESS NOTES
[facility] Pharmacy Note:  Renal Dose Adjustment    Zosyn (piperacillin/tazobactam) dosing had been previously adjusted by pharmacy due to renal insufficiency for Benoit Montanez. Estimated Creatinine Clearance: 31.1 mL/min (A) (based on SCr of 2.31 mg/dL (H)).

## 2018-11-21 NOTE — PROGRESS NOTES
[facility] Pharmacy Note:  Antibiotic Dose Adjustment    Zithromax (azithromycin) 250 mg IV q24h was ordered for Ahsan Osman by Dr. Sim Mercado. Body mass index is 27.31 kg/m².   Wt Readings from Last 6 Encounters:  11/21/18 : 96.5 kg (212 lb 11.2 oz)  10/10/18 :

## 2018-11-21 NOTE — PROGRESS NOTES
[facility] HOSP - [facility]    Progress Note    Sydni Cali Patient Status:  Inpatient    1940 MRN [id number]   Location [facility] 2W/SW Attending Judy Dahl MD   Hosp Day # 2 PCP Mame Grande MD       Subjective:   Sydni Cali i 11/20/2018       Xr Chest Ap Portable  (cpt=71045)    Result Date: 11/20/2018  CONCLUSION:  1. Successful placement of left PICC line into the SVC.      Dictated by (CST): Caterina Hooks MD on 11/20/2018 at 14:21     Approved by (CST): Neva Rhythm -Prominent R(V1) and left axis -nonspecific -Seen with pulmonary disease -possible anterior fascicular block. - Nonspecific T-abnormality.  ABNORMAL No previous ECGs available Electronically signed on 11/19/2018 at 15:14 by Monik Joe MD

## 2018-11-21 NOTE — RESPIRATORY THERAPY NOTE
Patient placed on CPAP 5 /PS 10 ,40% FIO2  At 0905 per DR PLASENCIA. .RR 23, ML,HR 70,O2 SAT 96%. Patient placed back on full support at 0940- patient tired,- 250 ML  ET tube advanced  and taped 25 cms at the lip.

## 2018-11-21 NOTE — PLAN OF CARE
Problem: Patient Centered Care  Goal: Patient preferences are identified and integrated in the patient's plan of care  Interventions:  - What would you like us to know as we care for you?  Pt lives at home alone, sister checks in on him  - Provide timely, c condition, hydration, nutrition and elimination needs   - Reorient and redirection as needed  - Assess for the need to continue restraints  Outcome: Not Progressing  Patient attempts to reach for tube and lines without realizing.  BUE soft wrist restraints instruct to report SOB or any respiratory difficulty  - Respiratory Therapy support as indicated  - Manage/alleviate anxiety  - Monitor for signs/symptoms of CO2 retention  Outcome: Progressing      Problem: GASTROINTESTINAL - ADULT  Goal: Maintains adequa impaired, hearing impaired and aphasic patients to use assistive/communication devices  Outcome: Not Progressing      Problem: Diabetes/Glucose Control  Goal: Glucose maintained within prescribed range  INTERVENTIONS:  - Monitor Blood Glucose as ordered  -

## 2018-11-21 NOTE — PLAN OF CARE
Problem: Patient Centered Care  Goal: Patient preferences are identified and integrated in the patient's plan of care  Interventions:  - What would you like us to know as we care for you?  Pt lives at home alone, sister checks in on him  - Provide timely, c hemodynamic stability  INTERVENTIONS:  - Monitor vital signs, rhythm, and trends  - Monitor for bleeding, hypotension and signs of decreased cardiac output  - Evaluate effectiveness of vasoactive medications to optimize hemodynamic stability  - Monitor art appropriate  - Assist with meals as needed  - Monitor I&O, WT and lab values  - Obtain nutritional consult as needed  - Optimize oral hygiene and moisture  - Encourage food from home; allow for food preferences  - Enhance eating environment  Outcome: Progr

## 2018-11-21 NOTE — PROGRESS NOTES
[facility]    Cardiology Progress Note    Shameka Tamayo Patient Status:  Inpatient    1940 MRN [id number]   Location [facility] 2W/SW Attending Jodi Elliott MD   Hosp Day # 2 PCP Art Smyth MD         Assessment and P Results:     Lab Results   Component Value Date    WBC 20.5 (H) 11/21/2018    HGB 9.9 (L) 11/21/2018    HCT 29.7 (L) 11/21/2018     11/21/2018    CREATSERUM 2.31 (H) 11/21/2018    BUN 55 (H) 11/21/2018     11/21/2018    K 4.6 11/21/201 11/19/2018 at 11:03          Ekg 12-lead    Result Date: 11/19/2018  ECG Report  Interpretation  -------------------------- Sinus Rhythm -Left axis -anterior fascicular block.  ABNORMAL When compared with ECG of 11/19/2018 10:15:51 No significant changes ha

## 2018-11-21 NOTE — PROGRESS NOTES
[facility]    Progress Note    Drema Signs Patient Status:  Inpatient    1940 MRN [id number]   Location [facility] 2W/SW Attending Alisia Apple MD   Hosp Day # 2 PCP Marina Hrone MD        Subjective:     Unable to 9.9 (L) 11/21/2018    HCT 29.7 (L) 11/21/2018     11/21/2018    CREATSERUM 2.31 (H) 11/21/2018    BUN 55 (H) 11/21/2018     11/21/2018    K 4.6 11/21/2018     11/21/2018    CO2 31 11/21/2018     (H) 11/21/2018    CA 7.5 (L) 11/21/ nodule. Followup PA and lateral chest x-ray after clearing of pneumonia recommended for further evaluation.    Dictated by (CST): Bettina Nazario MD on 11/21/2018 at 9:05     Approved by (CST): Bettina Nazario MD on 11/21/2018 at 9:11     ADDENDUM:  Ebenezer Arzola J Carlos Hooks MD on 11/19/2018 at 20:29          Xr Chest Ap Portable  (cpt=71045)    Result Date: 11/19/2018  CONCLUSION:  1. Borderline cardiomegaly. Tortuous atherosclerotic aorta. 2. Bilateral mixed alveolar and interstitial opacification.  Fernandez

## 2018-11-22 ENCOUNTER — APPOINTMENT (OUTPATIENT)
Dept: GENERAL RADIOLOGY | Facility: HOSPITAL | Age: 78
DRG: 208 | End: 2018-11-22
Attending: INTERNAL MEDICINE
Payer: MEDICARE

## 2018-11-22 PROCEDURE — 71045 X-RAY EXAM CHEST 1 VIEW: CPT | Performed by: INTERNAL MEDICINE

## 2018-11-22 PROCEDURE — 99291 CRITICAL CARE FIRST HOUR: CPT | Performed by: INTERNAL MEDICINE

## 2018-11-22 RX ORDER — METHYLPREDNISOLONE SODIUM SUCCINATE 125 MG/2ML
60 INJECTION, POWDER, LYOPHILIZED, FOR SOLUTION INTRAMUSCULAR; INTRAVENOUS EVERY 12 HOURS
Status: DISCONTINUED | OUTPATIENT
Start: 2018-11-22 | End: 2018-11-23

## 2018-11-22 RX ORDER — SODIUM CHLORIDE 9 MG/ML
INJECTION, SOLUTION INTRAVENOUS
Status: COMPLETED
Start: 2018-11-22 | End: 2018-11-22

## 2018-11-22 NOTE — PLAN OF CARE
Safety Risk - Non-Violent Restraints    • Patient will remain free from self-harm Not Progressing        Patient continues to attempt to remove medically necessary equipment - restraints continued per physician order.     Patient/family educated regarding r

## 2018-11-22 NOTE — PLAN OF CARE
Pulmonary/ICU ([facility]): repeat SBT, extubate, D/C IVF   Nephrology [facility]): agree with D/C of IVF  Cardiology (Ranjan Atkinson): continue, will defer fluid management to nephrology     Patient extubated according to telephone orders/protocol at 21 [phone], placed on 6L na

## 2018-11-22 NOTE — PROGRESS NOTES
[facility]    Progress Note    Marino Tobias Patient Status:  Inpatient    1940 MRN [id number]   Location [facility] 2W/SW Attending Yael Deng MD   Hosp Day # 3 PCP Martha Lopez MD       Assessment and Plan:      1. 11/22/18   0555   RBC  3.27*  3.11*  2.98*   HGB  10.4*  9.9*  9.4*   HCT  31.2*  29.7*  28.6*   MCV  95.2  95.4  96.1   MCH  31.8  31.8  31.6   MCHC  33.4  33.3  32.9   RDW  13.8  14.1  14.0   WBC  16.5*  20.5*  17.7*   PLT  293  296  280       Recent Lab

## 2018-11-22 NOTE — SLP NOTE
ADULT SWALLOWING EVALUATION    ASSESSMENT    ASSESSMENT/OVERALL IMPRESSION:  Orders rec'd, chart reviewed. Pt extubated following approx three day intubation. Pt seen sitting upright in chair with audible phonation with hoarse vocal quality.  Phonation was position; Slow rate;Small bites and sips; No straw  Medication Administration Recommendations: Crushed in puree  Treatment Plan/Recommendations: Aspiration precautions  Discharge Recommendations/Plan: Undetermined    HISTORY   MEDICAL HISTORY  Reason for Ref cannot be evaluated clinically.  Videofluoroscopic Swallow Study is required to rule-out silent aspiration.)    GOALS  Goal #1 The patient will tolerate puree consistency and nectar liquids without overt signs or symptoms of aspiration with 100 % accuracy o

## 2018-11-22 NOTE — PROGRESS NOTES
[facility]    Progress Note    Kayy Odin Patient Status:  Inpatient    1940 MRN [id number]   Location [facility] 2W/SW Attending Luis Enrique Wayne MD   Hosp Day # 3 PCP Nancy Patel MD        Subjective:     Unable to                             Results:     Lab Results   Component Value Date    WBC 20.5 (H) 11/21/2018    HGB 9.9 (L) 11/21/2018    HCT 29.7 (L) 11/21/2018     11/21/2018    CREATSERUM 2.31 (H) 11/21/2018    BUN 55 (H) 11/21/2018     11/21/2 in SVC. Nasogastric tube tip approximately 6 cm below the GE junction in the cardia fundus junction of stomach. CONCLUSION: Please see addendum below  1. Patchy pneumonia in the left lung base. No significant change.  2. Minimal linear bilateral perihilar consolidation.  2. Support tubes and catheters are satisfactory     Dictated by (CST): Alejo Mariano MD on 11/20/2018 at 7:43     Approved by (CST): Alejo Mariano MD on 11/20/2018 at 7:46                     Patient will require inpatient servic

## 2018-11-22 NOTE — PROGRESS NOTES
[facility]    Progress Note    Sisto Freeze Patient Status:  Inpatient    1940 MRN [id number]   Location [facility] 2W/SW Attending Dilcia Zuniga MD   Hosp Day # 3 PCP Ping Talbert MD       Subjective:   Sisto Freeze i 11/22/2018  CONCLUSION:   Lines and tubes in place and unchanged. Imaging findings suggestive of COPD and scarring, unchanged.     Dictated by (CST): Raine Degroot MD on 11/22/2018 at 7:50     Approved by (CST): Raine Degroot MD on 11/22/2018 at 7:52 Minimal linear bilateral perihilar atelectasis. 3. NG tube tip in proximal stomach-approximately 6 cm below GE junction (high in position). 4. ET tube approximately 5.5 cm above elizabeth. 5. Left PICC line tip in SVC.     ADDENDUM: Questionable 1.4 cm left mi

## 2018-11-22 NOTE — PLAN OF CARE
Problem: Safety Risk - Non-Violent Restraints  Goal: Patient will remain free from self-harm  INTERVENTIONS:  - Apply the least restrictive restraint to prevent harm  - Notify patient and family of reasons restraints applied  - Assess for any contributing respiratory difficulty  - Respiratory Therapy support as indicated  - Manage/alleviate anxiety  - Monitor for signs/symptoms of CO2 retention  Outcome: Not Progressing  Failed SBT in am, patient fully awake, sedation increased to help patient rest.  Lungs

## 2018-11-23 ENCOUNTER — APPOINTMENT (OUTPATIENT)
Dept: GENERAL RADIOLOGY | Facility: HOSPITAL | Age: 78
DRG: 208 | End: 2018-11-23
Attending: INTERNAL MEDICINE
Payer: MEDICARE

## 2018-11-23 PROCEDURE — 99233 SBSQ HOSP IP/OBS HIGH 50: CPT | Performed by: INTERNAL MEDICINE

## 2018-11-23 PROCEDURE — 71045 X-RAY EXAM CHEST 1 VIEW: CPT | Performed by: INTERNAL MEDICINE

## 2018-11-23 RX ORDER — FAMOTIDINE 20 MG/1
20 TABLET ORAL DAILY
Status: DISCONTINUED | OUTPATIENT
Start: 2018-11-24 | End: 2018-11-23

## 2018-11-23 RX ORDER — METHYLPREDNISOLONE SODIUM SUCCINATE 40 MG/ML
40 INJECTION, POWDER, LYOPHILIZED, FOR SOLUTION INTRAMUSCULAR; INTRAVENOUS EVERY 12 HOURS
Status: DISCONTINUED | OUTPATIENT
Start: 2018-11-23 | End: 2018-11-27

## 2018-11-23 RX ORDER — PANTOPRAZOLE SODIUM 40 MG/1
40 TABLET, DELAYED RELEASE ORAL
Status: DISCONTINUED | OUTPATIENT
Start: 2018-11-24 | End: 2018-11-29

## 2018-11-23 RX ORDER — SODIUM POLYSTYRENE SULFONATE 15 G/60ML
30 SUSPENSION ORAL; RECTAL ONCE
Status: COMPLETED | OUTPATIENT
Start: 2018-11-23 | End: 2018-11-23

## 2018-11-23 RX ORDER — SODIUM CHLORIDE 9 MG/ML
INJECTION, SOLUTION INTRAVENOUS
Status: DISPENSED
Start: 2018-11-23 | End: 2018-11-24

## 2018-11-23 NOTE — PROGRESS NOTES
[facility] Pharmacy Note: Route Optimization for Famotidine (PEPCID)    Patient is currently on Famotidine (PEPCID) 20 mg IV every 24 hours.    The patient meets the criteria to convert to the oral equivalent as established by the IV to Oral conversion protocol ap

## 2018-11-23 NOTE — PROGRESS NOTES
[facility] HOSP - [facility]    Cardiology Progress Note    Daniel Petty Patient Status:  Inpatient    1940 MRN [id number]   [facility] 2W/SW Attending Elias Nazario MD   [facility] Day # 4 PCP Tavon Combs MD       Daly Santana 7. 5*  7.7*  8.1*   MG   --   1.6*  1.8  2.1   --    PHOS   --    --   3.0  2.6  3.3   GLU  123*  218*  190*  167*  149*       Recent Labs   Lab  11/21/18   0425  11/22/18   0555  11/23/18   0509   ALB  2.2*  2.1*  2.4*       Recent Labs   Lab  11/19/18   1 packet 17 g 17 g Oral Daily PRN   bisacodyl (DULCOLAX) rectal suppository 10 mg 10 mg Rectal Daily PRN   morphINE sulfate (PF) 2 MG/ML injection 1 mg 1 mg Intravenous Q2H PRN   Or      morphINE sulfate (PF) 2 MG/ML injection 2 mg 2 mg Intravenous Q2H PRN

## 2018-11-23 NOTE — PROGRESS NOTES
[facility]    Progress Note    Anderw Ace Patient Status:  Inpatient    1940 MRN [id number]   Location [facility] 2W/SW Attending Tanna Triana MD   Hosp Day # 4 PCP Mari Ordonez MD       Subjective:   Andrew Ace i airspace disease.      Dictated by (CST): Wallace Jeans, MD on 11/23/2018 at 8:03     Approved by (CST): Wallace Jeans, MD on 11/23/2018 at 8:06          Xr Chest Ap Portable  (cpt=71045)    Result Date: 11/22/2018  CONCLUSION:   Lines and tubes in place an

## 2018-11-23 NOTE — PROGRESS NOTES
[facility]    Progress Note    Tanmay Real Patient Status:  Inpatient    1940 MRN [id number]   Location [facility] 2W/SW Attending Valerie German MD   Hosp Day # 4 PCP Soy Wheeler MD        Subjective:     Unable to code                            Results:     Lab Results   Component Value Date    WBC 18.6 (H) 11/23/2018    HGB 10.0 (L) 11/23/2018    HCT 31.1 (L) 11/23/2018     11/23/2018    CREATSERUM 1.60 (H) 11/23/2018    BUN 45 (H) 11/23/2018     (H)

## 2018-11-24 ENCOUNTER — APPOINTMENT (OUTPATIENT)
Dept: CT IMAGING | Facility: HOSPITAL | Age: 78
DRG: 208 | End: 2018-11-24
Attending: INTERNAL MEDICINE
Payer: MEDICARE

## 2018-11-24 PROCEDURE — 70450 CT HEAD/BRAIN W/O DYE: CPT | Performed by: INTERNAL MEDICINE

## 2018-11-24 PROCEDURE — 99233 SBSQ HOSP IP/OBS HIGH 50: CPT | Performed by: INTERNAL MEDICINE

## 2018-11-24 RX ORDER — SODIUM POLYSTYRENE SULFONATE 15 G/60ML
30 SUSPENSION ORAL; RECTAL ONCE
Status: COMPLETED | OUTPATIENT
Start: 2018-11-24 | End: 2018-11-24

## 2018-11-24 RX ORDER — DEXTROSE MONOHYDRATE 25 G/50ML
50 INJECTION, SOLUTION INTRAVENOUS ONCE
Status: COMPLETED | OUTPATIENT
Start: 2018-11-24 | End: 2018-11-24

## 2018-11-24 NOTE — OCCUPATIONAL THERAPY NOTE
OCCUPATIONAL THERAPY EVALUATION - INPATIENT     Room Number: 224/224-A  Evaluation Date: 11/24/2018  Type of Evaluation: Initial  Presenting Problem: (SOB, decreased o2)    Physician Order: IP Consult to Occupational Therapy  Reason for Therapy: ADL/IADL D using RW to bedside chair. Patient without SOB or changes in O2 during this session. At baseline patient is fully independent with self care and community mobility. He is well below his functional baseline and demonstrates several limitations.  His curr Assistive Device(s): owns cane, RW, uses 2L home o2    Prior Level of Reeves: Prior to admission, patient was living at home alone. He is independent with all self care tasks. He drives. He has a cleaning service once a month.  He has supportive famil off regular upper body clothing?: A Lot  -   Taking care of personal grooming such as brushing teeth?: A Lot  -   Eating meals?: A Lot    AM-PAC Score:  Score: 12  Approx Degree of Impairment: 66.57%  Standardized Score (AM-PAC Scale): 30.6  CMS Modifier (

## 2018-11-24 NOTE — PROGRESS NOTES
[facility]    Cardiology Progress Note    Winthrop Gaucher Patient Status:  Inpatient    1940 MRN [id number]   Location [facility] 2W/SW Attending Manjit Felix MD   [facility] Day # 5 PCP Shelly Henning MD       Stefany Herman 5.9*   CL  92*  103  106  107   --   110  108   CO2  30  31  34*  35*   --   36*  35*   BUN  61*  55*  48*  45*   --   44*  43*   CREATSERUM  3.21*  2.31*  1.87*  1.60*   --   1.47  1.40   CA  7.8*  7.5*  7.7*  8.1*   --   8.1*  8.3*   MG  1.6*  1.8  2.1 ondansetron HCl (ZOFRAN) injection 4 mg 4 mg Intravenous Q6H PRN   Atropine Sulfate 0.1 MG/ML injection 0.5 mg 0.5 mg Intravenous PRN   nitroGLYCERIN (NITROSTAT) SL tab 0.4 mg 0.4 mg Sublingual Q5 Min PRN   Normal Saline Flush 0.9 % injection 10 mL 10 mL

## 2018-11-24 NOTE — PHYSICAL THERAPY NOTE
PHYSICAL THERAPY EVALUATION - INPATIENT     Room Number: 224/224-A  Evaluation Date: 11/24/2018  Type of Evaluation: Initial   Physician Order: PT Eval and Treat    Presenting Problem: NSTEMI, COPD exacerbation, heart failure  Reason for Therapy: Mobility education; Family education;Gait training;Neuromuscular re-educate;Range of motion;Strengthening;Stoop training;Stair training;Transfer training;Balance training  Rehab Potential : Fair  Frequency (Obs): Daily       PHYSICAL THERAPY MEDICAL/SOCIAL HISTORY grossly 3+/5    BALANCE  Static Sitting: Fair +  Dynamic Sitting: Fair -  Static Standing: Poor +  Dynamic Standing: Poor -    NEUROLOGICAL FINDINGS  Neurological Findings: Coordination - Heel to Shin;Coordination - Rapid Alternating Movement     Coordinat conservation  Functional activity tolerated  Gait training  Transfer training    Patient End of Session: Up in chair;Needs met;Call light within reach;RN aware of session/findings; All patient questions and concerns addressed; Alarm set    CURRENT GOALS    G

## 2018-11-24 NOTE — PROGRESS NOTES
[facility]    Progress Note      Assessment and Plan:   1. Acute hypoxemic and hypercapnic respiratory failure–COPD and pneumonia with possible obstructive sleep apnea.     Recommendations: Nebulizer, antibiotics, swallowing strategies, juni 3. There is also large vessel atherosclerosis. 4. Lesser incidental findings as above.      Chyrl Boas, MD  Medical Director, Critical Care, [facility]  Medical Director, [facility]  Pager: [phone]

## 2018-11-24 NOTE — PLAN OF CARE
Problem: CARDIOVASCULAR - ADULT  Goal: Maintains optimal cardiac output and hemodynamic stability  INTERVENTIONS:  - Monitor vital signs, rhythm, and trends  - Monitor for bleeding, hypotension and signs of decreased cardiac output  - Evaluate effectivenes intracranial pressure  - Maintain blood pressure and fluid volume within ordered parameters to optimize cerebral perfusion and minimize risk of hemorrhage  - Monitor temperature, glucose, and sodium.  Initiate appropriate interventions as ordered  Outcome:

## 2018-11-25 ENCOUNTER — APPOINTMENT (OUTPATIENT)
Dept: GENERAL RADIOLOGY | Facility: HOSPITAL | Age: 78
DRG: 208 | End: 2018-11-25
Attending: INTERNAL MEDICINE
Payer: MEDICARE

## 2018-11-25 PROCEDURE — 99233 SBSQ HOSP IP/OBS HIGH 50: CPT | Performed by: INTERNAL MEDICINE

## 2018-11-25 PROCEDURE — 71045 X-RAY EXAM CHEST 1 VIEW: CPT | Performed by: INTERNAL MEDICINE

## 2018-11-25 RX ORDER — MAGNESIUM OXIDE 400 MG (241.3 MG MAGNESIUM) TABLET
3 TABLET NIGHTLY PRN
Status: DISCONTINUED | OUTPATIENT
Start: 2018-11-25 | End: 2018-11-29

## 2018-11-25 RX ORDER — DEXTROSE MONOHYDRATE 50 MG/ML
INJECTION, SOLUTION INTRAVENOUS CONTINUOUS
Status: DISCONTINUED | OUTPATIENT
Start: 2018-11-25 | End: 2018-11-28

## 2018-11-25 NOTE — PLAN OF CARE
Patient Centered Care    • Patient preferences are identified and integrated in the patient's plan of care Not Progressing     Patient frustrated due to fluid restriction, does not remember when he gets water, forgetful to days events and states \" I did e AM, update on plan of care. Patient has transfer orders to Telemetry floor once bed becomes available.

## 2018-11-25 NOTE — PROGRESS NOTES
[facility]    Progress Note      Assessment and Plan:   1. Acute hypoxemic and hypercapnic respiratory failure–COPD and pneumonia with possible obstructive sleep apnea.   The chest x-ray is unimpressive except for COPD and subtle basilar ha PHOS 3.9 11/25/2018     CT scan of the brain–1. No acute intracranial process by noncontrast CT technique. 2. Senescent changes of parenchymal volume loss with minimal, early sequela of chronic microvascular ischemic disease.       3. There is also l

## 2018-11-25 NOTE — PROGRESS NOTES
[facility] HOSP - [facility]    Cardiology Progress Note    Nicole Bermudez Patient Status:  Inpatient    1940 MRN [id number]   [facility] 2W/SW Attending Cayden Angelo MD   [facility] Day # 6 PCP Shaw Zaidi MD       Aimee Moore 145*   --   148*  149*   --   151*   K  4.3  4.6  5.3*  6.3*  5.2*  5.4*  5.9*  5.1  5.1   CL  92*  103  106  107   --   110  108   --   108   CO2  30  31  34*  35*   --   36*  35*   --   37*   BUN  61*  55*  48*  45*   --   44*  43*   --   36*   ELVIS injection 3 mL 3 mL Intravenous PRN   Heparin Sodium (Porcine) 5000 UNIT/ML injection 5,000 Units 5,000 Units Subcutaneous 2 times per day   acetaminophen (TYLENOL) tab 650 mg 650 mg Oral Q6H PRN   ondansetron HCl (ZOFRAN) injection 4 mg 4 mg Intravenous Q follow.         Georgine Rinne, MD  [facility]. Interventional Cardiology.   [phone]

## 2018-11-25 NOTE — PHYSICAL THERAPY NOTE
PHYSICAL THERAPY TREATMENT NOTE - INPATIENT     Room Number: 832/523-R       Presenting Problem: NSTEMI, COPD exacerbation, heart failure    Problem List  Principal Problem:    NSTEMI (non-ST elevated myocardial infarction) [facility])  Active Problems:    COPD O2 WALK                  AM-PAC '6-Clicks' INPATIENT SHORT FORM - BASIC MOBILITY  How much difficulty does the patient currently have. ..  -   Turning over in bed (including adjusting bedclothes, sheets and blankets)?: A Little   -   Sitting down on and Current Status    Goal #5 Patient to demonstrate home activity/exercise instructions provided to patient in preparation for discharge, with SV.     Goal #5   Current Status    Goal #6    Goal #6  Current Status

## 2018-11-25 NOTE — OCCUPATIONAL THERAPY NOTE
OCCUPATIONAL THERAPY TREATMENT NOTE - INPATIENT        Room Number: 609/749-I       Presenting Problem: (SOB, decreased o2)    Problem List  Principal Problem:    NSTEMI (non-ST elevated myocardial infarction) ([facility]  Active Problems:    COPD exacerbation ( training    SUBJECTIVE  \" I have no interest in football anymore\"    OBJECTIVE  Precautions: Bed/chair alarm    WEIGHT BEARING RESTRICTION  Weight Bearing Restriction: None          PAIN ASSESSMENT  Ratin       O2 SATURATIONS   3L   89-90% Healthcare  #[id number]

## 2018-11-25 NOTE — PROGRESS NOTES
[facility]    Progress Note    Ricarda Barraza Patient Status:  Inpatient    1940 MRN [id number]   Location [facility] 2W/SW Attending Ricky Martinez MD   Hosp Day # 6 PCP Re Gonzales MD       Subjective:   Ricarda Barraza i 37*          Ct Brain Or Head (92578)    Result Date: 11/24/2018  CONCLUSION:  1. No acute intracranial process by noncontrast CT technique.   2. Senescent changes of parenchymal volume loss with minimal, early sequela of chronic microvascular ischemic dise

## 2018-11-26 PROCEDURE — 99233 SBSQ HOSP IP/OBS HIGH 50: CPT | Performed by: INTERNAL MEDICINE

## 2018-11-26 PROCEDURE — 99233 SBSQ HOSP IP/OBS HIGH 50: CPT | Performed by: HOSPITALIST

## 2018-11-26 RX ORDER — SODIUM POLYSTYRENE SULFONATE 15 G/60ML
30 SUSPENSION ORAL; RECTAL ONCE
Status: COMPLETED | OUTPATIENT
Start: 2018-11-26 | End: 2018-11-26

## 2018-11-26 RX ORDER — SODIUM CHLORIDE 9 MG/ML
INJECTION, SOLUTION INTRAVENOUS
Status: DISPENSED
Start: 2018-11-26 | End: 2018-11-27

## 2018-11-26 NOTE — OCCUPATIONAL THERAPY NOTE
OCCUPATIONAL THERAPY TREATMENT NOTE - INPATIENT        Room Number: 420/540-F           Presenting Problem: (SOB, decreased o2)    Problem List  Principal Problem:    NSTEMI (non-ST elevated myocardial infarction) ([facility] [address] [facility]  Active Problems:    COPD exacerbati techniques;ADL training;Functional transfer training; Endurance training;Patient/Family education;Patient/Family training    SUBJECTIVE  Agreeable to activity   i'm tired     OBJECTIVE  Precautions: Bed/chair alarm    WEIGHT BEARING RESTRICTION  Weight Bear

## 2018-11-26 NOTE — CM/SW NOTE
Pt extubated 11/22, currently on 4L O2. Pt has been seen by speech and PT/OT. PT note from yesterday 11/25 indicates pt ambulating 12ft with rolling walker and mod assist. The recommendation from PT and OT is for consideration of acute rehab.  KATRINA sent textp

## 2018-11-26 NOTE — PROGRESS NOTES
[facility]    Progress Note    Kriss Cook Patient Status:  Inpatient    1940 MRN [id number]   Location [facility] 2W/SW Attending Dipesh Hager MD   Hosp Day # 7 PCP Sanna Jaramillo MD       SUBJECTIVE:    Sleeping in incidental findings as above. Dictated by (CST): Moose Mckay MD on 11/24/2018 at 10:14     Approved by (CST): Moose Mckay MD on 11/24/2018 at 10:17          Xr Chest Ap Portable  (cpt=71045)    Result Date: 11/25/2018  CONCLUSION:  1.  Trace left Daily PRN   Normal Saline Flush 0.9 % injection 3 mL 3 mL Intravenous PRN   Heparin Sodium (Porcine) 5000 UNIT/ML injection 5,000 Units 5,000 Units Subcutaneous 2 times per day   acetaminophen (TYLENOL) tab 650 mg 650 mg Oral Q6H PRN   ondansetron HCl (ZOF pending    Greater than 35 minutes spent, >50% spent counseling re: treatment plan and workup      Chloé Esparza MD  11/26/2018  14:44 AM    **Certification      PHYSICIAN Certification of Need for Inpatient Hospitalization - Initial Certification    Jim Clayton

## 2018-11-26 NOTE — PROGRESS NOTES
[facility]    Progress Note    Magénesis Zhudawit Patient Status:  Inpatient    1940 MRN [id number]   Location [facility] 2W/SW Attending Deshawn Menjivar MD   Hosp Day # 7 PCP Martha Lopez MD        Subjective:     Constituti HGB 9.5 (L) 11/26/2018    HCT 30.2 (L) 11/26/2018     11/26/2018    CREATSERUM 1.30 11/26/2018    BUN 35 (H) 11/26/2018     (H) 11/26/2018    K 5.3 (H) 11/26/2018     11/26/2018    CO2 39 (H) 11/26/2018     (H) 11/26/2018    CA 8.

## 2018-11-26 NOTE — PROGRESS NOTES
[facility]    Cardiology Progress Note    Dileep Downey Patient Status:  Inpatient    1940 MRN [id number]   Location [facility] 2W/SW Attending Chanel Pineda MD   Hosp Day # 7 PCP Adam Godfrey MD         Assessment and P day   • docusate sodium  100 mg Oral BID         Results:     Lab Results   Component Value Date    WBC 12.6 (H) 11/26/2018    HGB 9.5 (L) 11/26/2018    HCT 30.2 (L) 11/26/2018     11/26/2018    CREATSERUM 1.30 11/26/2018    BUN 35 (H) 11/26/2018

## 2018-11-26 NOTE — PROGRESS NOTES
[facility]    Progress Note    Pat Christy Patient Status:  Inpatient    1940 MRN [id number]   Location [facility] 2W/SW Attending Heidy Bennett MD   Hosp Day # 7 PCP Tanner Lackey MD       Subjective:   Pat Christy i 104   CO2  35*   --   37*  39*          Xr Chest Ap Portable  (cpt=71045)    Result Date: 11/25/2018  CONCLUSION:  1. Trace left basilar opacities are favored to reflect atelectasis.   2. Hyperinflated lungs with diffuse conspicuity of the pulmonary vascul

## 2018-11-26 NOTE — PHYSICAL THERAPY NOTE
PHYSICAL THERAPY TREATMENT NOTE - INPATIENT     Room Number: 840/635-J       Presenting Problem: NSTEMI, COPD exacerbation, heart failure    Problem List  Principal Problem:    NSTEMI (non-ST elevated myocardial infarction) [facility])  Active Problems:    COPD Bed/chair alarm    WEIGHT BEARING RESTRICTION  Weight Bearing Restriction: None                PAIN ASSESSMENT   Ratin     Management Techniques: Activity promotion; Body mechanics; Relaxation;Repositioning    BALANCE walker - rolling      Goal #2  Current Status Max A x 2   Goal #3 Patient is able to ambulate 150 feet with assist device: walker - rolling at assistance level: supervision   Goal #3   Current Status Amb 7ft w/ RW & Mod A x 1 w/ close chair follow of sujey

## 2018-11-26 NOTE — CM/SW NOTE
Care Management/BPCI:    Met with patient at bedside to explain the BPCI/Medicare program. Patient agreed with phone f/u for 3 months from 0 [facility] after discharge from [facility]. Patient was enrolled under .  BPCI/Medicare Letter and [place]

## 2018-11-27 PROCEDURE — 99233 SBSQ HOSP IP/OBS HIGH 50: CPT | Performed by: HOSPITALIST

## 2018-11-27 PROCEDURE — 99233 SBSQ HOSP IP/OBS HIGH 50: CPT | Performed by: INTERNAL MEDICINE

## 2018-11-27 RX ORDER — ATORVASTATIN CALCIUM 40 MG/1
40 TABLET, FILM COATED ORAL DAILY
Status: DISCONTINUED | OUTPATIENT
Start: 2018-11-27 | End: 2018-11-29

## 2018-11-27 RX ORDER — MAGNESIUM OXIDE 400 MG (241.3 MG MAGNESIUM) TABLET
800 TABLET ONCE
Status: COMPLETED | OUTPATIENT
Start: 2018-11-27 | End: 2018-11-27

## 2018-11-27 RX ORDER — MONTELUKAST SODIUM 10 MG/1
10 TABLET ORAL NIGHTLY
Status: DISCONTINUED | OUTPATIENT
Start: 2018-11-27 | End: 2018-11-29

## 2018-11-27 RX ORDER — IPRATROPIUM BROMIDE AND ALBUTEROL SULFATE 2.5; .5 MG/3ML; MG/3ML
3 SOLUTION RESPIRATORY (INHALATION)
Status: DISCONTINUED | OUTPATIENT
Start: 2018-11-27 | End: 2018-11-29

## 2018-11-27 RX ORDER — AMOXICILLIN AND CLAVULANATE POTASSIUM 875; 125 MG/1; MG/1
1 TABLET, FILM COATED ORAL EVERY 12 HOURS SCHEDULED
Status: DISCONTINUED | OUTPATIENT
Start: 2018-11-27 | End: 2018-11-29

## 2018-11-27 RX ORDER — PREDNISONE 20 MG/1
40 TABLET ORAL
Status: DISCONTINUED | OUTPATIENT
Start: 2018-11-27 | End: 2018-11-28

## 2018-11-27 NOTE — PROGRESS NOTES
[facility]    Progress Note    Jesica Morales Patient Status:  Inpatient    1940 MRN [id number]   Location [facility] 2W/SW Attending Gretta Harvey MD   Hosp Day # 8 PCP Gwen Mederos MD        Subjective:     Constitutional: ok with cardiology   likely will need rehab                         Results:     Lab Results   Component Value Date    WBC 12.3 (H) 11/27/2018    HGB 9.9 (L) 11/27/2018    HCT 30.6 (L) 11/27/2018     11/27/2018    CREATSERUM 1.14 11/27/2018    BUN 2

## 2018-11-27 NOTE — CONSULTS
Consult received.   Will see formally tomorrow am to discuss rehab plan with patient and family, but based on chart review recommend acute inpatient rehab for close medical oversight, and PT,OT,SLP in an interdisciplinary rehab program . ELOS: 14 days  Reha

## 2018-11-27 NOTE — PROGRESS NOTES
[facility] - [facility]    Progress Note    Ricarda Barraza Patient Status:  Inpatient    1940 MRN [id number]   Location [facility] 2W/SW Attending Coby Trammell MD   [facility] Day # 8 PCP Re Gonzales MD       Subjective:   Ricarda Barraza is a(n)

## 2018-11-27 NOTE — PROGRESS NOTES
[facility] HOSP - [facility]    Cardiology Progress Note    Daniel Petty Patient Status:  Inpatient    1940 MRN [id number]   Location [facility] 2W/SW Attending Esmer Chu MD   Hosp Day # 8 PCP Tavon Combs MD         Assessment and P Subcutaneous 2 times per day   • docusate sodium  100 mg Oral BID         Results:     Lab Results   Component Value Date    WBC 12.3 (H) 11/27/2018    HGB 9.9 (L) 11/27/2018    HCT 30.6 (L) 11/27/2018     11/27/2018    CREATSERUM 1.14 11/27/2018

## 2018-11-27 NOTE — PROGRESS NOTES
[facility]    Progress Note    Kayy Mckinney Patient Status:  Inpatient    1940 MRN [id number]   Location [facility] 2W/SW Attending Tula Krabbe, MD   Hosp Day # 8 PCP Nancy Patel MD       SUBJECTIVE:    Feels a meka interstitial edema in the appropriate clinical setting versus other interstitial lung disease.     Dictated by (CST): Kayley Garcia MD on 11/25/2018 at 7:57     Approved by (CST): Kayley Garcia MD on 11/25/2018 at 8:01            Meds:     Current Facil g 17 g Oral Daily PRN   bisacodyl (DULCOLAX) rectal suppository 10 mg 10 mg Rectal Daily PRN   morphINE sulfate (PF) 2 MG/ML injection 1 mg 1 mg Intravenous Q2H PRN   Or      morphINE sulfate (PF) 2 MG/ML injection 2 mg 2 mg Intravenous Q2H PRN   Or      m

## 2018-11-27 NOTE — OCCUPATIONAL THERAPY NOTE
OCCUPATIONAL THERAPY TREATMENT NOTE - INPATIENT        Room Number: 009/862-J           Presenting Problem: (SOB, decreased o2)    Problem List  Principal Problem:    NSTEMI (non-ST elevated myocardial infarction) ([facility] [address]  Active Problems:    COPD exacerbati placed a number \"11\" after \"10\" when asked to set the hands at \"10 minutes past 11\". DISCHARGE RECOMMENDATIONS  OT Discharge Recommendations: Acute rehabilitation        PLAN  OT Treatment Plan: Balance activities; Energy conservation/work simplif CGA  Comment:cga in standing at sink     Patient will complete toileting with CGA  Comment: mod a               Goals  on:   Frequency: 5x a week

## 2018-11-27 NOTE — SLP NOTE
SPEECH DAILY NOTE - INPATIENT    ASSESSMENT & PLAN   ASSESSMENT  Pt seen for ongoing dysphagia therapy per recommendations of BSSE on 11/22/18.  SLP reviewed swallow POC including safe swallowing strategies as well as aspiration precautions with the patient Extra Sauces and Gravies   Diet Recommendations - Liquid: Nectar thick    Compensatory Strategies Recommended: No straws; Liquids via spoon; Slow rate; Alternate consistencies;Small bites and sips;Multiple swallows  Aspiration Precautions: Upright position; Sl Pt appropriate for upgrade to mechanical soft ground solids. SEE NEW/UPDATED GOAL. Not appropriate for upgrade to thin liquids. Continue to assess across 1-2 sessions.    In Progress   Goal #4 The patient will utilize compensatory strategies as outlined

## 2018-11-27 NOTE — PROGRESS NOTES
Pt transferred to tele room 312 per bed, pt's sister & brother-in-law here. Report called earlier to Arlin Mccann RN, update given.

## 2018-11-27 NOTE — PHYSICAL THERAPY NOTE
PHYSICAL THERAPY TREATMENT NOTE - INPATIENT     Room Number: 290/341-Y       Presenting Problem: NSTEMI, COPD exacerbation, heart failure    Problem List  Principal Problem:    NSTEMI (non-ST elevated myocardial infarction) [facility])  Active Problems:    COPD MOBILITY  How much difficulty does the patient currently have. ..  -   Turning over in bed (including adjusting bedclothes, sheets and blankets)?: A Little   -   Sitting down on and standing up from a chair with arms (e.g., wheelchair, bedside commode, etc. instructions provided to patient in preparation for discharge, with SV.     Goal #5   Current Status IN PROGRESS   Goal #6     Goal #6  Current Status

## 2018-11-28 ENCOUNTER — TELEPHONE (OUTPATIENT)
Dept: PULMONOLOGY | Facility: CLINIC | Age: 78
End: 2018-11-28

## 2018-11-28 PROCEDURE — 99232 SBSQ HOSP IP/OBS MODERATE 35: CPT | Performed by: INTERNAL MEDICINE

## 2018-11-28 PROCEDURE — 99233 SBSQ HOSP IP/OBS HIGH 50: CPT | Performed by: HOSPITALIST

## 2018-11-28 RX ORDER — IPRATROPIUM BROMIDE AND ALBUTEROL SULFATE 2.5; .5 MG/3ML; MG/3ML
SOLUTION RESPIRATORY (INHALATION)
Status: DISPENSED
Start: 2018-11-28 | End: 2018-11-29

## 2018-11-28 RX ORDER — MAGNESIUM OXIDE 400 MG (241.3 MG MAGNESIUM) TABLET
800 TABLET ONCE
Status: COMPLETED | OUTPATIENT
Start: 2018-11-28 | End: 2018-11-28

## 2018-11-28 NOTE — TRANSITION NOTE
HPI  Patient is a 69year old male who was admitted to the hospital for sob. He follows at [facility] with Ravindra Garcia and Miryam George a 72-year-old male who has been having progressive shortness of breath, cough productive of clear phlegm for the last w Bromide  1 puff Inhalation Daily   • Pantoprazole Sodium  40 mg Oral QAM AC   • Fluticasone Furoate-Vilanterol  1 puff Inhalation Daily   • Insulin Aspart Pen  1-5 Units Subcutaneous TID CC   • aspirin  325 mg Oral Daily   • Heparin Sodium (Porcine)  5,000

## 2018-11-28 NOTE — CM/SW NOTE
PT and PMR have recommended acute rehab. Spoke to patient and two sisters at bedside and explained acute rehab. Acute list provided. They will review and make decision. SW/CM to follow.   Cristin Hunt, [address] [place] [address]

## 2018-11-28 NOTE — DIETARY NOTE
ADULT NUTRITION REASSESSMENT     Pt is at moderate nutrition risk. Pt does not meet malnutrition criteria. RECOMMENDATIONS TO MD:  See Nutrition Intervention.      NUTRITION DIAGNOSIS/PROBLEM:  Inadequate protein energy intake related to intubation/NP gm Na+ & Fluid restriction in place   BMI: Body mass index is 28.32 kg/m².   BMI CLASSIFICATION: 25-29.9 kg/m2 - overweight  IBW: 190 lbs        Now 116 % IBW  Usual Body Wt: 215 lbs    Now 102% UBW   WEIGHT HISTORY:  Patient Weight(s) for the past 336 hrs: 11/25/18   0427  11/26/18   0344  11/27/18   0525  11/28/18   0612   GLU  143*  147*  140*  111*   BUN  36*  35*  27*  31*   CREATSERUM  1.38  1.30  1.14  1.15   CA  8.3*  8.1*  8.2*  8.3*   MG   --    --   1.2*  1.4*   NA  151*  146*  143  143   K  5.1

## 2018-11-28 NOTE — PLAN OF CARE
CARDIOVASCULAR - ADULT    • Maintains optimal cardiac output and hemodynamic stability Progressing    • Absence of cardiac arrhythmias or at baseline Progressing        Diabetes/Glucose Control    • Glucose maintained within prescribed range  Pt stated he

## 2018-11-28 NOTE — SLP NOTE
SPEECH DAILY NOTE - INPATIENT    ASSESSMENT & PLAN   ASSESSMENT  Pt seen for swallowing therapy to monitor swallowing tolerance, trial possible diet upgrade, and train swallowing precautions per recommendations of BSSE on 11/22/18.  Diet was upgraded during Recommendations - Liquid: Nectar thick  (No Straw)    Compensatory Strategies Recommended: No straws; Liquids via spoon; Slow rate; Alternate consistencies;Small bites and sips;Multiple swallows  Aspiration Precautions: Upright position; Slow rate;Small bites strategies as outlined by  BSSE (clinical evaluation) including Slow rate, Small bites, Small sips, Multiple swallows, Alternate liquids/solids, No straws, Upright 90 degrees with mild assistance 100 % of the time across 3 sessions.      SLP provided educat

## 2018-11-28 NOTE — TELEPHONE ENCOUNTER
Notified pt's sis that we do not have ZIA on file. She stts pt is inpt on cardiac floor & Cardiologist signed off on him. Per Shivani Colin she would like to speak w/ MD re: her brother's condition & which acute care facility he recommends.  Explained she should

## 2018-11-28 NOTE — PHYSICAL THERAPY NOTE
PHYSICAL THERAPY TREATMENT NOTE - INPATIENT     Room Number: 249/179-G       Presenting Problem: NSTEMI, COPD exacerbation, heart failure        Problem List  Principal Problem:    NSTEMI (non-ST elevated [facility] infarction) [facility]  Active Problems:    C Static Sitting: Fair +  Dynamic Sitting: Fair           Static Standing: Fair -  Dynamic Standing: Fair -    ACTIVITY TOLERANCE           BP: (!) 183/155(nursing notified)  BP Location: Right arm  BP Method: Automatic  Pa #2  Current Status CGA   Goal #3 Patient is able to ambulate 150 feet with assist device: walker - rolling at assistance level: supervision   Goal #3   Current Status Amb 30ft with RW and cga   Goal #4 Patient will negotiate 5 stairs/one curb w/ assistive

## 2018-11-28 NOTE — PROGRESS NOTES
[facility]    Progress Note    Zacarias Lange Patient Status:  Inpatient    1940 MRN [id number]   Location [facility] 3W/SW Attending Frederick Ramos MD   Hosp Day # 9 PCP Shelly Saeed MD        Subjective:     Cardiovasc (HH) 11/28/2018     (H) 11/28/2018    CA 8.3 (L) 11/28/2018    ALB 2.4 (L) 11/27/2018    MG 1.4 (L) 11/28/2018    PHOS 2.9 11/27/2018    TROP 0.17 (HH) 11/20/2018                        Korey Kerns MD  11/28/2018

## 2018-11-28 NOTE — TELEPHONE ENCOUNTER
Pts sister states she has concerns regarding pts status on moving him into an acute center.  Please advise [phone]

## 2018-11-28 NOTE — PROGRESS NOTES
[facility]    Cardiology Progress Note    Marie Ashford Patient Status:  Inpatient    1940 MRN [id number]   Location [facility] 2W/SW Attending Catrina Tomas MD   [facility] Day # 9 PCP Amanda Gregg MD         Assessment and P • Pantoprazole Sodium  40 mg Oral QAM AC   • Fluticasone Furoate-Vilanterol  1 puff Inhalation Daily   • Insulin Aspart Pen  1-5 Units Subcutaneous TID CC   • aspirin  325 mg Oral Daily   • Heparin Sodium (Porcine)  5,000 Units Subcutaneous 2 times per d

## 2018-11-28 NOTE — CONSULTS
PHYSICAL MEDICINE AND REHABILITATION CONSULTATION     CC: Impaired mobility and ADL dysfunction secondary to NSTEMI      HPI: This is a 52-year-old gentleman who presented to [facility] on November 19 from his doctor's office with chief com Umeclidinium Bromide (INCRUSE ELLIPTA) 62.5 MCG/INH inhaler 1 puff 1 puff Inhalation Daily   [COMPLETED] Sodium Polystyrene Sulfonate (KAYEXALATE) 15 GM/60ML suspension 30 g 30 g Oral Once   [] sodium chloride 0.9 % infusion      melatonin tab TAB [COMPLETED] sodium chloride 0.9 % infusion      [] sodium chloride 0.9 % infusion      [] Sodium Chloride 0.9 % solution      [] sodium chloride 0.9 % infusion      [COMPLETED] ipratropium-albuterol (DUONEB) nebulizer solution 3 mL 3 injection 4 mg 4 mg Intravenous Q2H PRN   propofol (DIPRIVAN) injection  Intravenous PRN   [COMPLETED] sodium chloride 0.9 % infusion          Allergies:  No Known Allergies    Past Medical History:  Past Medical History:   Diagnosis Date   • COPD (chronic walker  Pattern: Shuffle  Stoop/Curb Assistance: Not tested  Comment : (/)      FUNCTIONAL TRANSFER ASSESSMENT  Supine to Sit : Minimum assistance  Sit to Stand: Minimum assistance  Toilet Transfer: NT  Shower Transfer: NT  Chair Transfer:  Min a      Bedr CA 8.3 11/28/2018    MG 1.4 11/28/2018    PGLU 130 11/28/2018             ASSESSMENT:    Deficits of self care and mobility secondary to non ST elevation MI 2  Very severe COPD with known FEV1 34% with acute exacerbation, with pneumonia  Acute hypoxemic Christal Andrea MD, FAAPM&R

## 2018-11-28 NOTE — OCCUPATIONAL THERAPY NOTE
OCCUPATIONAL THERAPY TREATMENT NOTE - INPATIENT        Room Number: 435/562-L      Presenting Problem: (SOB, decreased o2)    Problem List  Principal Problem:    NSTEMI (non-ST elevated myocardial infarction) ([facility]  Active Problems:    COPD exacerbation (H conservation/work simplification techniques;ADL training;Functional transfer training; Endurance training;Patient/Family education;Patient/Family training;Equipment eval/education    SUBJECTIVE  Patient is pleasant and cooperative    OBJECTIVE  Precautions: complete toileting with CGA  Comment: mod a               Goals  on:   Frequency: 5x a week    9 [facility] MOT/R  [address] [place] [address]  [id number]

## 2018-11-28 NOTE — PROGRESS NOTES
[facility]    [facility] Note    Della Gimenez Patient Status:  Inpatient    1940 MRN [id number]   Location [facility] 2W/SW Attending Atiya Reyna MD   Hosp Day # 9 PCP Susanne Cary MD       SUBJECTIVE:    Feeling fine Montelukast Sodium (SINGULAIR) tab 10 mg 10 mg Oral Nightly   Umeclidinium Bromide (INCRUSE ELLIPTA) 62.5 MCG/INH inhaler 1 puff 1 puff Inhalation Daily   melatonin tab TABS 3 mg 3 mg Oral Nightly PRN   Pantoprazole Sodium (PROTONIX) EC tab 40 mg 40 mg O injection  Intravenous PRN         Assessment  Patient Active Problem List:     NSTEM[facility] (non-ST elevated [facility] infarction) [facility]     COPD exacerbation (HCC)     Heart failure, unspecified HF chronicity, unspecified heart failure type ([facility]     POPPY (acu

## 2018-11-29 VITALS
OXYGEN SATURATION: 97 % | WEIGHT: 220.13 LBS | DIASTOLIC BLOOD PRESSURE: 81 MMHG | TEMPERATURE: 98 F | BODY MASS INDEX: 28.25 KG/M2 | HEIGHT: 74 IN | RESPIRATION RATE: 16 BRPM | SYSTOLIC BLOOD PRESSURE: 146 MMHG | HEART RATE: 71 BPM

## 2018-11-29 PROCEDURE — 99233 SBSQ HOSP IP/OBS HIGH 50: CPT | Performed by: HOSPITALIST

## 2018-11-29 PROCEDURE — 99232 SBSQ HOSP IP/OBS MODERATE 35: CPT | Performed by: INTERNAL MEDICINE

## 2018-11-29 RX ORDER — IPRATROPIUM BROMIDE AND ALBUTEROL SULFATE 2.5; .5 MG/3ML; MG/3ML
3 SOLUTION RESPIRATORY (INHALATION) EVERY 4 HOURS PRN
Qty: 1 CONTAINER | Refills: 0 | Status: SHIPPED | OUTPATIENT
Start: 2018-11-29

## 2018-11-29 RX ORDER — MAGNESIUM OXIDE 400 MG (241.3 MG MAGNESIUM) TABLET
800 TABLET ONCE
Status: COMPLETED | OUTPATIENT
Start: 2018-11-29 | End: 2018-11-29

## 2018-11-29 RX ORDER — PANTOPRAZOLE SODIUM 40 MG/1
40 TABLET, DELAYED RELEASE ORAL
Qty: 30 TABLET | Refills: 0 | Status: SHIPPED | OUTPATIENT
Start: 2018-11-30

## 2018-11-29 RX ORDER — PREDNISONE 10 MG/1
TABLET ORAL
Qty: 12 TABLET | Refills: 0 | Status: SHIPPED | OUTPATIENT
Start: 2018-11-29

## 2018-11-29 NOTE — CM/SW NOTE
Spoke w/ sister Susanne Randhawa [phone] regarding discharge planning and acute rehab choice. She has chosen [facility]. Referral sent to  via [facility], liaison Margaret Acevedo aware.   [place] [phone]

## 2018-11-29 NOTE — SLP NOTE
SPEECH DAILY NOTE - INPATIENT    ASSESSMENT & PLAN   ASSESSMENT  Pt seen for swallowing therapy to monitor swallowing tolerance, trial possible diet upgrade, and train swallowing precautions per recommendations of BSSE on 11/22/18.  Diet was upgraded during followed by VFSS as warranted. Diet Recommendations - Solids: Mechanical Soft Ground Diet  (Extra Sauces and Gravy)  Diet Recommendations - Liquid: Nectar thick  (No Straw)    Compensatory Strategies Recommended: No straws; Liquids via spoon; Slow rate; strategies as outlined by  BSSE (clinical evaluation) including Slow rate, Small bites, Small sips, Multiple swallows, Alternate liquids/solids, No straws, Upright 90 degrees with mild assistance 100 % of the time across 3 sessions.      SLP provided educat

## 2018-11-29 NOTE — PROGRESS NOTES
[facility]    Progress Note    Ressie Nageotte Patient Status:  Inpatient    1940 MRN [id number]   Location [facility] 2W/SW Attending Junaid Vazquez MD   Hosp Day # 10 PCP Fritz Herbert MD       SUBJECTIVE:    Feeling fin Montelukast Sodium (SINGULAIR) tab 10 mg 10 mg Oral Nightly   Umeclidinium Bromide (INCRUSE ELLIPTA) 62.5 MCG/INH inhaler 1 puff 1 puff Inhalation Daily   melatonin tab TABS 3 mg 3 mg Oral Nightly PRN   Pantoprazole Sodium (PROTONIX) EC tab 40 mg 40 mg O injection  Intravenous PRN         Assessment  Patient Active Problem List:     NSTEM[facility] (non-ST elevated myocardial infarction) [facility]     COPD exacerbation (HCC)     Heart failure, unspecified HF chronicity, unspecified heart failure type ([facility]     POPPY (acu

## 2018-11-29 NOTE — CM/SW NOTE
ROMARIO informed by Dr. Eric Paredes that pt is medically stable for discharge today at 3:30pm. ROMARIO spoke with [facility]ann[facility] liaison [phone] who confirmed that they can accept the pt into room [phone] at that time.  [facility]

## 2018-11-29 NOTE — PROGRESS NOTES
Pulmonary/Critical Care Follow Up Note    HPI:   Daniel Petty is a 69year old male with Patient presents with:  Dyspnea LAITH SOB (respiratory)      PCP Tavon Combs MD  Admission Attending Rosy Powers MD    Hospital Day #10    No SOB at rest  Not wa Subcutaneous, 2 times per day  •  acetaminophen (TYLENOL) tab 650 mg, 650 mg, Oral, Q6H PRN  •  ondansetron HCl (ZOFRAN) injection 4 mg, 4 mg, Intravenous, Q6H PRN  •  Atropine Sulfate 0.1 MG/ML injection 0.5 mg, 0.5 mg, Intravenous, PRN  •  nitroGLYCERIN nebs   O2   Rehab     2-  Very severe COPD ( known FEV1 34 % ) with acute exacerbation / pneumonia   Plan Po Steroid and wean   Complete course of  antibiotics   ICS/LABA/LAMA     nebs     3- s/p encephalopathy / metabolic and Co2 narcosis   Better  / like

## 2019-01-02 ENCOUNTER — OFFICE VISIT (OUTPATIENT)
Dept: PULMONOLOGY | Facility: CLINIC | Age: 79
End: 2019-01-02
Payer: MEDICARE

## 2019-01-02 VITALS
DIASTOLIC BLOOD PRESSURE: 64 MMHG | WEIGHT: 221 LBS | BODY MASS INDEX: 28 KG/M2 | SYSTOLIC BLOOD PRESSURE: 109 MMHG | OXYGEN SATURATION: 91 % | HEART RATE: 87 BPM

## 2019-01-02 DIAGNOSIS — J96.12 CHRONIC RESPIRATORY FAILURE WITH HYPOXIA AND HYPERCAPNIA (HCC): ICD-10-CM

## 2019-01-02 DIAGNOSIS — J96.11 CHRONIC RESPIRATORY FAILURE WITH HYPOXIA AND HYPERCAPNIA (HCC): ICD-10-CM

## 2019-01-02 DIAGNOSIS — J41.1 MUCOPURULENT CHRONIC BRONCHITIS (HCC): Primary | ICD-10-CM

## 2019-01-02 PROCEDURE — 99214 OFFICE O/P EST MOD 30 MIN: CPT | Performed by: INTERNAL MEDICINE

## 2019-01-02 PROCEDURE — G0463 HOSPITAL OUTPT CLINIC VISIT: HCPCS | Performed by: INTERNAL MEDICINE

## 2019-01-02 NOTE — PROGRESS NOTES
HPI:    Patient ID: Jesica Morales is a 69year old male. HPI  Much better now   Off prednisone   Neb 4 times/day   + URBAN   Back to baseline   No sputum  No hemoptysis   Review of Systems   Constitutional: Negative for fever.    Respiratory: Positive for Inhalation Aero Soln INL 1 PUFF PO INTO THE LUNGS QID Disp:  Rfl: 3     Allergies:No Known Allergies   PHYSICAL EXAM:   Physical Exam   Constitutional: He is oriented to person, place, and time. He appears well-nourished. No distress.    Cardiovascular: Nor

## 2019-01-10 ENCOUNTER — ORDER TRANSCRIPTION (OUTPATIENT)
Dept: PHYSICAL THERAPY | Facility: HOSPITAL | Age: 79
End: 2019-01-10

## 2019-01-10 ENCOUNTER — OFFICE VISIT (OUTPATIENT)
Dept: PHYSICAL THERAPY | Facility: HOSPITAL | Age: 79
End: 2019-01-10
Attending: INTERNAL MEDICINE
Payer: MEDICARE

## 2019-01-10 DIAGNOSIS — Z74.09 IMPAIRED MOBILITY: Primary | ICD-10-CM

## 2019-01-10 DIAGNOSIS — R26.9 ABNORMALITY OF GAIT AND MOBILITY: ICD-10-CM

## 2019-01-10 DIAGNOSIS — Z74.09 IMPAIRED TRANSFERS: ICD-10-CM

## 2019-01-10 DIAGNOSIS — G93.40 ACUTE ENCEPHALOPATHY: ICD-10-CM

## 2019-01-10 PROCEDURE — 97161 PT EVAL LOW COMPLEX 20 MIN: CPT

## 2019-01-10 NOTE — PROGRESS NOTES
PHYSICAL THERAPY EVALUATION:   Referring Physician:   Diagnosis: decreased endurance, difficulty walking      Date of Onset: November 2018 Date of Service: 1/10/2019     PATIENT SUMMARY   Cheryl Rangel is a 69year old y/o male who presents to th TBA    Postural Control:  [place] Balance Scale     Item Description Score (0 worst-4 best)    ___3___1. Sitting to standing  ___4___2. Standing unsupported   ___4___3. Sitting unsupported   ___3___4. Standing to sitting   ___4___5. Transfers   ___4___6.  Armin Majano Exercises; Neuromuscular Re-education; Therapeutic Activity; Patient education; Home exercise program instruction.       Education or treatment limitation: COPD  Rehab Potential:good    Patient/Family was advised of these findings, precautions, and treatmen

## 2019-01-14 ENCOUNTER — OFFICE VISIT (OUTPATIENT)
Dept: PHYSICAL THERAPY | Facility: HOSPITAL | Age: 79
End: 2019-01-14
Attending: NURSE PRACTITIONER
Payer: MEDICARE

## 2019-01-14 PROCEDURE — 97110 THERAPEUTIC EXERCISES: CPT

## 2019-01-14 NOTE — PATIENT INSTRUCTIONS
Do these 2 times each day. 1. Sit on the edge of the bed. With your arms crossed practice standing up and sitting down in a slow and controlled manner. Do this 10 times. 2. Stand tall at the countertop and hold on lightly.   Kick one leg forward, ou

## 2019-01-14 NOTE — PROGRESS NOTES
Diagnosis: decreased endurance, difficulty walking   Visit (# authorized):    10 per POC (Medicare)                      Referring Physician: Kimberly Brewer    Precautions: Pacemaker and Fall Risk    Medication changes since last visit?:  No    Subjective: No

## 2019-01-16 ENCOUNTER — OFFICE VISIT (OUTPATIENT)
Dept: PHYSICAL THERAPY | Facility: HOSPITAL | Age: 79
End: 2019-01-16
Attending: NURSE PRACTITIONER
Payer: MEDICARE

## 2019-01-16 PROCEDURE — 97110 THERAPEUTIC EXERCISES: CPT

## 2019-01-16 PROCEDURE — 97116 GAIT TRAINING THERAPY: CPT

## 2019-01-16 NOTE — PROGRESS NOTES
Diagnosis: decreased endurance, difficulty walking   Visit (# authorized):    10 per POC (Medicare)                      Referring Physician: Shasha Rebolledo    Precautions: Pacemaker and Fall Risk    Medication changes since last visit?:  No    Subjective: No

## 2019-01-22 ENCOUNTER — OFFICE VISIT (OUTPATIENT)
Dept: PHYSICAL THERAPY | Facility: HOSPITAL | Age: 79
End: 2019-01-22
Attending: NURSE PRACTITIONER
Payer: MEDICARE

## 2019-01-22 PROCEDURE — 97112 NEUROMUSCULAR REEDUCATION: CPT

## 2019-01-22 PROCEDURE — 97110 THERAPEUTIC EXERCISES: CPT

## 2019-01-22 NOTE — PROGRESS NOTES
Diagnosis: decreased endurance, difficulty walking   Visit (# authorized):    10 per POC (Medicare)                      Referring Physician: Jammie Mckinley    Precautions: Pacemaker and Fall Risk    Medication changes since last visit?:  No    Subjective: Fee

## 2019-01-25 ENCOUNTER — APPOINTMENT (OUTPATIENT)
Dept: PHYSICAL THERAPY | Facility: HOSPITAL | Age: 79
End: 2019-01-25
Attending: NURSE PRACTITIONER
Payer: MEDICARE

## 2019-01-25 NOTE — DISCHARGE SUMMARY
[facility]    Discharge Summary    Marie Ashford Patient Status:  Inpatient    1940 MRN [id number]   Location [facility] 3W/SW Attending Catrina Tomas MD   Hosp Day # 10 PCP Amanda Gregg MD     Date of Admission:  patient had a chest x-ray which showed multifocal pneumonia versus pulmonary edema. B-natriuretic peptide was around 1100. CBC showed white blood cell count of 16.8, hemoglobin 11.5. GFR dropped to 16, baseline mid 20s to mid 30s.   The patient was start breakfast.   Quantity:  30 tablet  Refills:  0     predniSONE 10 MG Tabs  Commonly known as:  DELTASONE      Take 2 tabs po daily for 4 days and then 1 tab po daily for 4 days   Quantity:  12 tablet  Refills:  0        CHANGE how you take these medications prescriptions at the location directed by your doctor or nurse    Bring a paper prescription for each of these medications  · Insulin Aspart Pen 100 UNIT/ML Sopn  · ipratropium-albuterol 0.5-2.5 (3) MG/3ML Soln  · Pantoprazole Sodium 40 MG Tbec  · predniSO

## 2019-01-28 ENCOUNTER — APPOINTMENT (OUTPATIENT)
Dept: PHYSICAL THERAPY | Facility: HOSPITAL | Age: 79
End: 2019-01-28
Attending: NURSE PRACTITIONER
Payer: MEDICARE

## 2019-01-30 ENCOUNTER — APPOINTMENT (OUTPATIENT)
Dept: PHYSICAL THERAPY | Facility: HOSPITAL | Age: 79
End: 2019-01-30
Attending: NURSE PRACTITIONER
Payer: MEDICARE

## 2019-02-04 ENCOUNTER — OFFICE VISIT (OUTPATIENT)
Dept: PHYSICAL THERAPY | Facility: HOSPITAL | Age: 79
End: 2019-02-04
Attending: NURSE PRACTITIONER
Payer: MEDICARE

## 2019-02-04 PROCEDURE — 97110 THERAPEUTIC EXERCISES: CPT

## 2019-02-04 PROCEDURE — 97112 NEUROMUSCULAR REEDUCATION: CPT

## 2019-02-04 NOTE — PATIENT INSTRUCTIONS
Do these 2 times each day. 1. Stand up tall at countertop. Hold on with both hands. Bend both knees a small amount and stick bottom out as if you were going to sit in a chair. Stay in that position for a few seconds then stand back up.  Do this 10 times

## 2019-02-04 NOTE — PROGRESS NOTES
Diagnosis: decreased endurance, difficulty walking   Visit (# authorized):    10 per POC (Medicare)                      Referring Physician: Chantelle Le    Precautions: Pacemaker and Fall Risk    Medication changes since last visit?:  No    Subjective: Pt

## 2019-02-06 ENCOUNTER — OFFICE VISIT (OUTPATIENT)
Dept: PHYSICAL THERAPY | Facility: HOSPITAL | Age: 79
End: 2019-02-06
Attending: NURSE PRACTITIONER
Payer: MEDICARE

## 2019-02-06 PROCEDURE — 97110 THERAPEUTIC EXERCISES: CPT

## 2019-02-06 PROCEDURE — 97112 NEUROMUSCULAR REEDUCATION: CPT

## 2019-02-06 NOTE — PROGRESS NOTES
Diagnosis: decreased endurance, difficulty walking   Visit (# authorized):    10 per POC (Medicare)                      Referring Physician: Maximus Garza    Precautions: Pacemaker and Fall Risk    Medication changes since last visit?:  No    Subjective: No

## 2019-02-11 ENCOUNTER — OFFICE VISIT (OUTPATIENT)
Dept: PHYSICAL THERAPY | Facility: HOSPITAL | Age: 79
End: 2019-02-11
Attending: NURSE PRACTITIONER
Payer: MEDICARE

## 2019-02-11 PROCEDURE — 97110 THERAPEUTIC EXERCISES: CPT

## 2019-02-11 PROCEDURE — 97112 NEUROMUSCULAR REEDUCATION: CPT

## 2019-02-11 NOTE — PROGRESS NOTES
Diagnosis: decreased endurance, difficulty walking   Visit (# authorized):    10 per POC (Medicare)                      Referring Physician: Gene Staton    Precautions: Pacemaker and Fall Risk    Medication changes since last visit?:  No    Subjective: No

## 2019-02-15 ENCOUNTER — OFFICE VISIT (OUTPATIENT)
Dept: PHYSICAL THERAPY | Facility: HOSPITAL | Age: 79
End: 2019-02-15
Attending: INTERNAL MEDICINE
Payer: MEDICARE

## 2019-02-15 PROCEDURE — 97112 NEUROMUSCULAR REEDUCATION: CPT

## 2019-02-15 PROCEDURE — 97110 THERAPEUTIC EXERCISES: CPT

## 2019-02-15 NOTE — PROGRESS NOTES
Diagnosis: decreased endurance, difficulty walking   Visit (# authorized):    10 per POC (Medicare)                      Referring Physician: Kesha Soto    Precautions: Pacemaker and Fall Risk    Medication changes since last visit?:  No    Subjective: No

## 2019-02-22 ENCOUNTER — OFFICE VISIT (OUTPATIENT)
Dept: PHYSICAL THERAPY | Facility: HOSPITAL | Age: 79
End: 2019-02-22
Attending: INTERNAL MEDICINE
Payer: MEDICARE

## 2019-02-22 PROCEDURE — 97112 NEUROMUSCULAR REEDUCATION: CPT

## 2019-02-22 PROCEDURE — 97110 THERAPEUTIC EXERCISES: CPT

## 2019-02-22 NOTE — PROGRESS NOTES
Diagnosis: decreased endurance, difficulty walking   Visit (# authorized):    10 per POC (Medicare)                      Referring Physician: Lee Trinh    Precautions: Pacemaker and Fall Risk    Medication changes since last visit?:  No    Subjective: No

## 2019-02-22 NOTE — PATIENT INSTRUCTIONS
Try walking in your house for a couple minutes at time every half hour especially if you have been sitting for a long time. Do these 2 times each day. 1. Stand up tall at countertop. Hold on with both hands.  Bend both knees a small amount and stick

## 2019-02-27 ENCOUNTER — OFFICE VISIT (OUTPATIENT)
Dept: PHYSICAL THERAPY | Facility: HOSPITAL | Age: 79
End: 2019-02-27
Attending: INTERNAL MEDICINE
Payer: MEDICARE

## 2019-02-27 PROCEDURE — 97110 THERAPEUTIC EXERCISES: CPT

## 2019-02-27 PROCEDURE — 97112 NEUROMUSCULAR REEDUCATION: CPT

## 2019-02-27 NOTE — PROGRESS NOTES
Patient Name: Tanmay Real, : 1940, MRN: [id number]   Date:  2019  Referring Physician: Dr Warren Schmitt    Diagnosis: decreased endurance, difficulty walking     Discharge Summary    Pt has attended 10 visits in Physical Therapy.      Progress N elevated surface without UE's     Timed Up and Go (AD, time):12 seconds quad cane (15.5 sec, RW); 12 seconds (14,3 sec, no AD)       6 minute walk test  Distance walked: 656 ft (594 ft)  Total time walked: 5 mins 9 seconds   Number of rest breaks: 3  No AD

## 2019-04-24 RX ORDER — FLUTICASONE FUROATE, UMECLIDINIUM BROMIDE AND VILANTEROL TRIFENATATE 100; 62.5; 25 UG/1; UG/1; UG/1
POWDER RESPIRATORY (INHALATION)
Qty: 1 EACH | Refills: 2 | Status: SHIPPED | OUTPATIENT
Start: 2019-04-24 | End: 2019-10-10

## 2019-07-10 ENCOUNTER — OFFICE VISIT (OUTPATIENT)
Dept: PULMONOLOGY | Facility: CLINIC | Age: 79
End: 2019-07-10
Payer: MEDICARE

## 2019-07-10 VITALS
SYSTOLIC BLOOD PRESSURE: 159 MMHG | BODY MASS INDEX: 27.08 KG/M2 | HEART RATE: 97 BPM | OXYGEN SATURATION: 92 % | HEIGHT: 74 IN | DIASTOLIC BLOOD PRESSURE: 79 MMHG | WEIGHT: 211 LBS

## 2019-07-10 DIAGNOSIS — J96.12 CHRONIC RESPIRATORY FAILURE WITH HYPOXIA AND HYPERCAPNIA (HCC): ICD-10-CM

## 2019-07-10 DIAGNOSIS — J42 CHRONIC BRONCHITIS, UNSPECIFIED CHRONIC BRONCHITIS TYPE (HCC): Primary | ICD-10-CM

## 2019-07-10 DIAGNOSIS — J96.11 CHRONIC RESPIRATORY FAILURE WITH HYPOXIA AND HYPERCAPNIA (HCC): ICD-10-CM

## 2019-07-10 PROCEDURE — 99214 OFFICE O/P EST MOD 30 MIN: CPT | Performed by: INTERNAL MEDICINE

## 2019-07-10 PROCEDURE — G0463 HOSPITAL OUTPT CLINIC VISIT: HCPCS | Performed by: INTERNAL MEDICINE

## 2019-07-10 NOTE — PROGRESS NOTES
HPI:    Patient ID: Jennifer Smalls is a 67year old male.     HPI    Patient doing well overall with no exacerbation since last admission, baseline functional status stable and he is able to perform his activities of daily living with dyspnea on exertion maxim Calcium 40 MG Oral Tab TK 1 T PO  D Disp:  Rfl: 3   ADVAIR DISKUS 500-50 MCG/DOSE Inhalation Aerosol Powder, Breath Activated INHALE 1 PUFF INTO THE LUNGS Q 12 H Disp:  Rfl: 3   COMBIVENT RESPIMAT  MCG/ACT Inhalation Aero Soln INL 1 PUFF PO INTO THE

## 2019-10-11 RX ORDER — FLUTICASONE FUROATE, UMECLIDINIUM BROMIDE AND VILANTEROL TRIFENATATE 100; 62.5; 25 UG/1; UG/1; UG/1
POWDER RESPIRATORY (INHALATION)
Qty: 1 EACH | Refills: 5 | Status: SHIPPED | OUTPATIENT
Start: 2019-10-11 | End: 2020-04-09

## 2020-01-13 ENCOUNTER — OFFICE VISIT (OUTPATIENT)
Dept: PULMONOLOGY | Facility: CLINIC | Age: 80
End: 2020-01-13
Payer: MEDICARE

## 2020-01-13 VITALS
SYSTOLIC BLOOD PRESSURE: 112 MMHG | TEMPERATURE: 98 F | HEIGHT: 72 IN | OXYGEN SATURATION: 98 % | BODY MASS INDEX: 29.07 KG/M2 | WEIGHT: 214.63 LBS | DIASTOLIC BLOOD PRESSURE: 66 MMHG | HEART RATE: 98 BPM

## 2020-01-13 DIAGNOSIS — J44.9 CHRONIC OBSTRUCTIVE PULMONARY DISEASE, UNSPECIFIED COPD TYPE (HCC): Primary | ICD-10-CM

## 2020-01-13 DIAGNOSIS — J96.11 CHRONIC RESPIRATORY FAILURE WITH HYPOXIA (HCC): ICD-10-CM

## 2020-01-13 PROCEDURE — 99214 OFFICE O/P EST MOD 30 MIN: CPT | Performed by: INTERNAL MEDICINE

## 2020-01-13 PROCEDURE — G0463 HOSPITAL OUTPT CLINIC VISIT: HCPCS | Performed by: INTERNAL MEDICINE

## 2020-01-13 RX ORDER — MELATONIN
1000 DAILY
COMMUNITY

## 2020-01-13 NOTE — PROGRESS NOTES
HPI:    Patient ID: Flaco Szymanski is a 67year old male.     HPI  Doing very well overall with no exacerbation in the last 6-month  No active cough or sputum  Stable exercise performance  Good appetite with no change in weight  No edema no chest pain  No co THE LUNGS QID  3   • TRELEGY ELLIPTA 100-62.5-25 MCG/INH Inhalation Aerosol Powder, Breath Activated INHALE 1 PUFF INTO THE LUNGS DAILY 1 each 5     Allergies:No Known Allergies   PHYSICAL EXAM:   Physical Exam   Constitutional: He is oriented to person, p

## 2020-04-09 RX ORDER — FLUTICASONE FUROATE, UMECLIDINIUM BROMIDE AND VILANTEROL TRIFENATATE 100; 62.5; 25 UG/1; UG/1; UG/1
POWDER RESPIRATORY (INHALATION)
Qty: 60 EACH | Refills: 5 | Status: SHIPPED | OUTPATIENT
Start: 2020-04-09

## 2020-07-13 ENCOUNTER — OFFICE VISIT (OUTPATIENT)
Dept: PULMONOLOGY | Facility: CLINIC | Age: 80
End: 2020-07-13
Payer: MEDICARE

## 2020-07-13 VITALS
DIASTOLIC BLOOD PRESSURE: 60 MMHG | OXYGEN SATURATION: 96 % | HEART RATE: 73 BPM | WEIGHT: 206 LBS | HEIGHT: 72 IN | BODY MASS INDEX: 27.9 KG/M2 | SYSTOLIC BLOOD PRESSURE: 98 MMHG

## 2020-07-13 DIAGNOSIS — J96.11 CHRONIC RESPIRATORY FAILURE WITH HYPOXIA (HCC): ICD-10-CM

## 2020-07-13 DIAGNOSIS — J44.9 CHRONIC OBSTRUCTIVE PULMONARY DISEASE, UNSPECIFIED COPD TYPE (HCC): Primary | ICD-10-CM

## 2020-07-13 PROCEDURE — G0463 HOSPITAL OUTPT CLINIC VISIT: HCPCS | Performed by: INTERNAL MEDICINE

## 2020-07-13 PROCEDURE — 99214 OFFICE O/P EST MOD 30 MIN: CPT | Performed by: INTERNAL MEDICINE

## 2020-07-13 NOTE — PROGRESS NOTES
HPI:    Patient ID: Angelo Flores is a 79year old male.     HPI  Dyspnea on exertion overall about baseline  Doing well with activity of daily living  Still driving his car with acceptable performance status  No active cough or sputum  No hemoptysis  Good taking: Reported on 7/13/2020 ) 1 pen 0   • Umeclidinium Bromide (INCRUSE ELLIPTA) 62.5 MCG/INH Inhalation Aerosol Powder, Breath Activated Inhale 1 puff into the lungs daily. 1 each 5   • aspirin  MG Oral Tab EC Take 325 mg by mouth.        Allergies

## 2021-02-02 DIAGNOSIS — Z23 NEED FOR VACCINATION: ICD-10-CM

## 2021-03-19 ENCOUNTER — IMMUNIZATION (OUTPATIENT)
Dept: LAB | Facility: HOSPITAL | Age: 81
End: 2021-03-19
Attending: HOSPITALIST
Payer: MEDICARE

## 2021-03-19 DIAGNOSIS — Z23 NEED FOR VACCINATION: Primary | ICD-10-CM

## 2021-03-19 PROCEDURE — 0011A SARSCOV2 VAC 100MCG/0.5ML IM: CPT

## 2021-04-16 ENCOUNTER — IMMUNIZATION (OUTPATIENT)
Dept: LAB | Facility: HOSPITAL | Age: 81
End: 2021-04-16
Attending: EMERGENCY MEDICINE
Payer: MEDICARE

## 2021-04-16 DIAGNOSIS — Z23 NEED FOR VACCINATION: Primary | ICD-10-CM

## 2021-04-16 PROCEDURE — 0012A SARSCOV2 VAC 100MCG/0.5ML IM: CPT

## 2021-04-19 ENCOUNTER — OFFICE VISIT (OUTPATIENT)
Dept: PULMONOLOGY | Facility: CLINIC | Age: 81
End: 2021-04-19
Payer: MEDICARE

## 2021-04-19 VITALS
BODY MASS INDEX: 26.31 KG/M2 | SYSTOLIC BLOOD PRESSURE: 90 MMHG | OXYGEN SATURATION: 91 % | HEART RATE: 81 BPM | WEIGHT: 205 LBS | HEIGHT: 74 IN | RESPIRATION RATE: 18 BRPM | DIASTOLIC BLOOD PRESSURE: 52 MMHG

## 2021-04-19 DIAGNOSIS — J96.12 CHRONIC RESPIRATORY FAILURE WITH HYPOXIA AND HYPERCAPNIA (HCC): ICD-10-CM

## 2021-04-19 DIAGNOSIS — J96.11 CHRONIC RESPIRATORY FAILURE WITH HYPOXIA AND HYPERCAPNIA (HCC): ICD-10-CM

## 2021-04-19 DIAGNOSIS — J44.9 CHRONIC OBSTRUCTIVE PULMONARY DISEASE, UNSPECIFIED COPD TYPE (HCC): Primary | ICD-10-CM

## 2021-04-19 PROCEDURE — 99213 OFFICE O/P EST LOW 20 MIN: CPT | Performed by: INTERNAL MEDICINE

## 2021-04-19 NOTE — PROGRESS NOTES
HPI/Subjective:   Patient ID: Nikkie Silveira is a [de-identified]year old male.     HPI  Overall doing well  Uses oxygen only as needed and he prefer not to use it  No active cough or sputum  No fever or chills  Able to perform ADL  Dyspnea on exertion less than 1 block  MCG/ACT Inhalation Aero Soln INL 1 PUFF PO INTO THE LUNGS QID  3   • predniSONE 10 MG Oral Tab Take 2 tabs po daily for 4 days and then 1 tab po daily for 4 days (Patient not taking: Reported on 4/19/2021 ) 12 tablet 0     Allergies:No Known Allergi Meds This Visit:  Requested Prescriptions      No prescriptions requested or ordered in this encounter       Imaging & Referrals:  None

## 2021-09-27 ENCOUNTER — OFFICE VISIT (OUTPATIENT)
Dept: PULMONOLOGY | Facility: CLINIC | Age: 81
End: 2021-09-27
Payer: MEDICARE

## 2021-09-27 VITALS
HEIGHT: 73 IN | DIASTOLIC BLOOD PRESSURE: 61 MMHG | OXYGEN SATURATION: 90 % | SYSTOLIC BLOOD PRESSURE: 116 MMHG | TEMPERATURE: 98 F | WEIGHT: 211.63 LBS | BODY MASS INDEX: 28.05 KG/M2 | RESPIRATION RATE: 16 BRPM | HEART RATE: 75 BPM

## 2021-09-27 DIAGNOSIS — J96.11 CHRONIC RESPIRATORY FAILURE WITH HYPOXIA (HCC): ICD-10-CM

## 2021-09-27 DIAGNOSIS — J44.9 CHRONIC OBSTRUCTIVE PULMONARY DISEASE, UNSPECIFIED COPD TYPE (HCC): Primary | ICD-10-CM

## 2021-09-27 PROCEDURE — 99214 OFFICE O/P EST MOD 30 MIN: CPT | Performed by: INTERNAL MEDICINE

## 2021-09-27 NOTE — PROGRESS NOTES
Subjective:   Patient ID: Marino Tobias is a [de-identified]year old male.     HPI    Dyspnea upon exertion less than 1 block and seems worse in the last 2 years  No active cough or sputum  Belching and GI issues and going to have EGD and colonoscopy no fever or chills Calcium 40 MG Oral Tab TK 1 T PO  D  3   • ADVAIR DISKUS 500-50 MCG/DOSE Inhalation Aerosol Powder, Breath Activated INHALE 1 PUFF INTO THE LUNGS Q 12 H  3   • COMBIVENT RESPIMAT  MCG/ACT Inhalation Aero Soln INL 1 PUFF PO INTO THE LUNGS QID  3   • a support            - ICS/LABA/LAMA now on Trelegy inhaler   - combivent prn    - neb as needed   - home O2 2 L NC (patient qualifies and requires immediate home O2 at 2 L NC )   - already had flu shot   -Fully vaccinated for COVID-19  -Pulmonary rehab  [facility]

## 2021-11-01 ENCOUNTER — TELEPHONE (OUTPATIENT)
Dept: PULMONOLOGY | Facility: CLINIC | Age: 81
End: 2021-11-01

## 2021-12-13 ENCOUNTER — HOSPITAL ENCOUNTER (OUTPATIENT)
Dept: GENERAL RADIOLOGY | Facility: HOSPITAL | Age: 81
Discharge: HOME | End: 2021-12-13
Attending: INTERNAL MEDICINE
Payer: MEDICARE

## 2021-12-13 DIAGNOSIS — J96.11 CHRONIC RESPIRATORY FAILURE WITH HYPOXIA (HCC): ICD-10-CM

## 2021-12-13 DIAGNOSIS — J44.9 CHRONIC OBSTRUCTIVE PULMONARY DISEASE, UNSPECIFIED COPD TYPE (HCC): ICD-10-CM

## 2021-12-13 PROCEDURE — 71046 X-RAY EXAM CHEST 2 VIEWS: CPT | Performed by: INTERNAL MEDICINE

## 2021-12-17 ENCOUNTER — TELEPHONE (OUTPATIENT)
Dept: PULMONOLOGY | Facility: CLINIC | Age: 81
End: 2021-12-17

## 2021-12-17 DIAGNOSIS — R91.8 LUNG MASS: Primary | ICD-10-CM

## 2021-12-17 NOTE — TELEPHONE ENCOUNTER
I called the pt and d/w david the findings on CXR 12/13/2021 whoch showed lung mass on CXR   He has no symptoms for now / no cough or sputum and no cp   I ordered chest ct   F/u with me in 2-3 weeks

## 2021-12-21 NOTE — TELEPHONE ENCOUNTER
Discussed Dr. Nataly Magallanes orders below w/ pt. Scheduled pt on 1/14 4:15 pm [place] per pt. Appt info given. He voiced understanding. Pt is scheduled for CT chest on 1/4/22.

## 2021-12-28 ENCOUNTER — IMMUNIZATION (OUTPATIENT)
Dept: LAB | Facility: HOSPITAL | Age: 81
End: 2021-12-28
Attending: EMERGENCY MEDICINE
Payer: MEDICARE

## 2021-12-28 DIAGNOSIS — Z23 NEED FOR VACCINATION: Primary | ICD-10-CM

## 2021-12-28 PROCEDURE — 0064A SARSCOV2 VAC 50MCG/0.25ML IM: CPT

## 2022-01-01 ENCOUNTER — TELEPHONE (OUTPATIENT)
Dept: RADIATION ONCOLOGY | Facility: HOSPITAL | Age: 82
End: 2022-01-01

## 2022-01-04 ENCOUNTER — HOSPITAL ENCOUNTER (OUTPATIENT)
Dept: CT IMAGING | Facility: HOSPITAL | Age: 82
Discharge: HOME | End: 2022-01-04
Attending: INTERNAL MEDICINE
Payer: MEDICARE

## 2022-01-04 DIAGNOSIS — R91.8 LUNG MASS: ICD-10-CM

## 2022-01-04 LAB — CREAT BLD-MCNC: 2.1 MG/DL

## 2022-01-04 PROCEDURE — 82565 ASSAY OF CREATININE: CPT

## 2022-01-04 PROCEDURE — 71250 CT THORAX DX C-: CPT | Performed by: INTERNAL MEDICINE

## 2022-01-12 ENCOUNTER — OFFICE VISIT (OUTPATIENT)
Dept: PULMONOLOGY | Facility: CLINIC | Age: 82
End: 2022-01-12
Payer: MEDICARE

## 2022-01-12 VITALS
DIASTOLIC BLOOD PRESSURE: 54 MMHG | BODY MASS INDEX: 27.83 KG/M2 | WEIGHT: 210 LBS | OXYGEN SATURATION: 95 % | HEART RATE: 62 BPM | HEIGHT: 73 IN | RESPIRATION RATE: 14 BRPM | SYSTOLIC BLOOD PRESSURE: 92 MMHG

## 2022-01-12 DIAGNOSIS — R91.8 LUNG MASS: ICD-10-CM

## 2022-01-12 DIAGNOSIS — J43.1 PANLOBULAR EMPHYSEMA (HCC): Primary | ICD-10-CM

## 2022-01-12 PROCEDURE — 99214 OFFICE O/P EST MOD 30 MIN: CPT | Performed by: INTERNAL MEDICINE

## 2022-01-12 RX ORDER — OMEPRAZOLE 40 MG/1
40 CAPSULE, DELAYED RELEASE ORAL DAILY
COMMUNITY
Start: 2021-11-23

## 2022-01-12 RX ORDER — LOSARTAN POTASSIUM 50 MG/1
50 TABLET ORAL DAILY
COMMUNITY
Start: 2021-12-08

## 2022-01-12 NOTE — PROGRESS NOTES
Subjective:   Patient ID: Inés Pierre is a [de-identified]year old male.     HPI  Pt came with his sister today   Pt with dyspnea with any activity / dyspnea with ADL   Minimal cough / no hemoptysis   No fever or chills   Fair appetite   No abd pain or n/v/d  No LE 1-5 Units into the skin 3 (three) times daily with meals. (Patient not taking: Reported on 1/12/2022) 1 pen 0   • aspirin  MG Oral Tab EC Take 325 mg by mouth.  (Patient not taking: Reported on 1/12/2022)     • COMBIVENT RESPIMAT  MCG/ACT Inhala bronchovascular structures.  Specifically the bronchus intermedius as well as the origin of the middle lobe and lower lobe bronchi are narrowed/occlusion. Rosemary Glass are otherwise patent peripheral to the mariposa.    LUNGS/PLEURA: Trachea is patent.  Panlobular emp characterized on this exam.   BONES: Mild endplate change and disc disease throughout the spine.  Old right lateral rib fracture deformities.  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       OTHER: Negative.                   further work up.    Long d/w pt and his sister / will start with non-invasive testing with PET scan   Then to proceed with biopsy if safe / accessible without significant risk   To obtain after that oncology opinion / pt not interested in chemotherapy but I

## 2022-01-13 ENCOUNTER — TELEPHONE (OUTPATIENT)
Dept: PULMONOLOGY | Facility: CLINIC | Age: 82
End: 2022-01-13

## 2022-01-13 NOTE — TELEPHONE ENCOUNTER
Pts sister Brown Smoker states that PET scan is scheduled for 1/28/22 but there was no order for needle biopsy. Please call.

## 2022-01-17 NOTE — TELEPHONE ENCOUNTER
Dr. Darryn Calvert, please verify that patient is to only have PET scan at this time? Not biopsy at this time.

## 2022-01-18 NOTE — TELEPHONE ENCOUNTER
Spoke with patient informed him of Dr. Nataliia Rodriguez message. Patient verbalized understanding.

## 2022-01-18 NOTE — TELEPHONE ENCOUNTER
Spoke with patient's sister, Stacy Wright, informed her that we do not have an ZIA on file, so I cannot provide information regarding patient's sensitive health information. Left message for patient to call back.

## 2022-01-28 ENCOUNTER — HOSPITAL ENCOUNTER (OUTPATIENT)
Dept: NUCLEAR MEDICINE | Facility: HOSPITAL | Age: 82
Discharge: HOME | End: 2022-01-28
Attending: INTERNAL MEDICINE
Payer: MEDICARE

## 2022-01-28 DIAGNOSIS — R91.8 LUNG MASS: ICD-10-CM

## 2022-01-28 LAB — GLUCOSE BLDC GLUCOMTR-MCNC: 111 MG/DL (ref 70–99)

## 2022-01-28 PROCEDURE — 82962 GLUCOSE BLOOD TEST: CPT

## 2022-01-28 PROCEDURE — 78815 PET IMAGE W/CT SKULL-THIGH: CPT | Performed by: INTERNAL MEDICINE

## 2022-02-09 ENCOUNTER — TELEPHONE (OUTPATIENT)
Dept: PULMONOLOGY | Facility: CLINIC | Age: 82
End: 2022-02-09

## 2022-02-09 NOTE — TELEPHONE ENCOUNTER
I spoke with [facility]. It is extremely difficult for patient to get around and make multiple appointments. States he is willing to proceed with bronchoscopy and biopsy. Dr. Kalani Foss and please advise on next steps.

## 2022-02-09 NOTE — TELEPHONE ENCOUNTER
Please arrange for bronchoscopy next week Tuesday on Wednesday with MAC anesthesia  Let me know if patient taking any anticoagulation such as Plavix or Coumadin or DOAC

## 2022-02-09 NOTE — TELEPHONE ENCOUNTER
Pt's sister states Dr Robby Bennett has to put in an order for a needle biopsy. .. he had to cancel his appt today because he is to weak.  Please call Miriam Das at [phone] and or [phone]

## 2022-02-10 NOTE — TELEPHONE ENCOUNTER
Per Taylor in Endoscopy ([id number]) pt may be scheduled on Tuesday afternoon at 1/1:30 pm [facility] (no availability on Wed), bronch must be scheduled x 90 min, & be entered as a surgical case request in EPIC.     Left msg for Indy Mckeon at [phone] to call RN back re: approval for bronchoscopy on Tuesday afternoon at 1/1:30 pm.

## 2022-02-10 NOTE — TELEPHONE ENCOUNTER
Pt notified Dr. Adela Riggs will return to clinic tomorrow, he wanted to arrange bronchoscopy next Tuesday/Wednesday, Tuesday was available for scheduling, but RN has to ensure that MD is agreeable w/ procedure time & will call him back w/ info re: procedure once response rcvd. Reassured him. He voiced understanding. Dr. Adela Riggs- pls see RN's entry 2/10/22 9:34 am & advise.

## 2022-02-10 NOTE — TELEPHONE ENCOUNTER
Per Ana Danielle ([id number]) pt will need COVID testing w/in 72 hours which is why approval necessary & ok to schedule pt for bronch at 1 pm on 2/15/22. He is aware RN will only f/u if MD wants to schedule bronchoscopy at 1:30 pm.    Dr. Makayla Kothari- jimmy review surgical case request & sign off on orders pending if agreeable. Pt is currently scheduled on 2/15/22 at 1 pm. Please let me know if you would like to change procedure time to 1:30 pm and if you want labs, fluoroscopy, & if r/o TB. Pt is on apixaban 5 mg oral tab po bid. Thank you.

## 2022-02-11 NOTE — TELEPHONE ENCOUNTER
Pt's sister, Bradley Sherman, was notified of procedure date, time, arrival time, & all pertinent instructions. Explained COVID testing prior to procedure is required, they will be receiving a call shortly to schedule this testing, & one care partner is currently allowed. All of her questions & concerns were addressed at this time.

## 2022-02-11 NOTE — TELEPHONE ENCOUNTER
Pt was informed of procedure date, time, arrival time, & all related instructions. He gave consent for our office to discuss his PHI w/ sister, Aimee Dow, and requests we contact her at #[phone] to relay all info re: procedure as she will be driving him. RN was agreeable. Pt had no further concerns/questions at this time. Per pt he is not taking any other blood thinning products besides Eliquis.

## 2022-02-11 NOTE — TELEPHONE ENCOUNTER
Taylor was informed of Dr. Nataliia Rodriguez orders below. Confirmed bronch to be scheduled x 90 min. Explained surgical case request & orders r/t procedure were placed, Marlon Beltran approved bronch to be done on 2/15 at 1 pm, & pt to hold Eliquis as of today per Dr. Rafia Davidson. She voiced understanding. Per Taylor bronch scheduled.

## 2022-02-12 ENCOUNTER — LAB ENCOUNTER (OUTPATIENT)
Dept: LAB | Facility: HOSPITAL | Age: 82
End: 2022-02-12
Attending: INTERNAL MEDICINE
Payer: MEDICARE

## 2022-02-12 DIAGNOSIS — Z01.818 PREOP TESTING: ICD-10-CM

## 2022-02-12 DIAGNOSIS — Z20.822 COVID-19 RULED OUT: ICD-10-CM

## 2022-02-12 LAB
ANION GAP SERPL CALC-SCNC: 8 MMOL/L (ref 0–18)
BASOPHILS # BLD AUTO: 0.07 X10(3) UL (ref 0–0.2)
BASOPHILS NFR BLD AUTO: 0.6 %
BUN BLD-MCNC: 35 MG/DL (ref 7–18)
BUN/CREAT SERPL: 13.9 (ref 10–20)
CALCIUM BLD-MCNC: 9.1 MG/DL (ref 8.5–10.1)
CHLORIDE SERPL-SCNC: 99 MMOL/L (ref 98–112)
CREAT BLD-MCNC: 2.52 MG/DL
DEPRECATED RDW RBC AUTO: 52.2 FL (ref 35.1–46.3)
EOSINOPHIL # BLD AUTO: 0.09 X10(3) UL (ref 0–0.7)
EOSINOPHIL NFR BLD AUTO: 0.7 %
ERYTHROCYTE [DISTWIDTH] IN BLOOD BY AUTOMATED COUNT: 14.1 % (ref 11–15)
FASTING STATUS PATIENT QL REPORTED: NO
GLUCOSE BLD-MCNC: 101 MG/DL (ref 70–99)
HCT VFR BLD AUTO: 31.8 %
HGB BLD-MCNC: 9.7 G/DL
IMM GRANULOCYTES # BLD AUTO: 0.04 X10(3) UL (ref 0–1)
IMM GRANULOCYTES NFR BLD: 0.3 %
LYMPHOCYTES # BLD AUTO: 1.44 X10(3) UL (ref 1–4)
LYMPHOCYTES NFR BLD AUTO: 11.8 %
MCH RBC QN AUTO: 31 PG (ref 26–34)
MCHC RBC AUTO-ENTMCNC: 30.5 G/DL (ref 31–37)
MCV RBC AUTO: 101.6 FL
MONOCYTES # BLD AUTO: 0.91 X10(3) UL (ref 0.1–1)
MONOCYTES NFR BLD AUTO: 7.5 %
NEUTROPHILS # BLD AUTO: 9.65 X10 (3) UL (ref 1.5–7.7)
NEUTROPHILS # BLD AUTO: 9.65 X10(3) UL (ref 1.5–7.7)
NEUTROPHILS NFR BLD AUTO: 79.1 %
OSMOLALITY SERPL CALC.SUM OF ELEC: 286 MOSM/KG (ref 275–295)
PLATELET # BLD AUTO: 346 10(3)UL (ref 150–450)
POTASSIUM SERPL-SCNC: 4 MMOL/L (ref 3.5–5.1)
RBC # BLD AUTO: 3.13 X10(6)UL
SODIUM SERPL-SCNC: 134 MMOL/L (ref 136–145)
WBC # BLD AUTO: 12.2 X10(3) UL (ref 4–11)

## 2022-02-12 PROCEDURE — 80048 BASIC METABOLIC PNL TOTAL CA: CPT

## 2022-02-12 PROCEDURE — 36415 COLL VENOUS BLD VENIPUNCTURE: CPT

## 2022-02-12 PROCEDURE — 85025 COMPLETE CBC W/AUTO DIFF WBC: CPT

## 2022-02-13 LAB — SARS-COV-2 RNA RESP QL NAA+PROBE: NOT DETECTED

## 2022-02-15 ENCOUNTER — ANESTHESIA EVENT (OUTPATIENT)
Dept: ENDOSCOPY | Facility: HOSPITAL | Age: 82
End: 2022-02-15
Payer: MEDICARE

## 2022-02-15 ENCOUNTER — ANESTHESIA (OUTPATIENT)
Dept: ENDOSCOPY | Facility: HOSPITAL | Age: 82
End: 2022-02-15
Payer: MEDICARE

## 2022-02-15 ENCOUNTER — HOSPITAL ENCOUNTER (OUTPATIENT)
Facility: HOSPITAL | Age: 82
Setting detail: HOSPITAL OUTPATIENT SURGERY
Discharge: HOME | End: 2022-02-15
Attending: INTERNAL MEDICINE | Admitting: INTERNAL MEDICINE
Payer: MEDICARE

## 2022-02-15 VITALS
OXYGEN SATURATION: 94 % | WEIGHT: 205 LBS | HEART RATE: 69 BPM | BODY MASS INDEX: 27.17 KG/M2 | DIASTOLIC BLOOD PRESSURE: 84 MMHG | TEMPERATURE: 97 F | RESPIRATION RATE: 22 BRPM | HEIGHT: 73 IN | SYSTOLIC BLOOD PRESSURE: 114 MMHG

## 2022-02-15 DIAGNOSIS — R91.8 LUNG MASS: ICD-10-CM

## 2022-02-15 LAB
ANION GAP SERPL CALC-SCNC: 7 MMOL/L (ref 0–18)
BASOPHILS # BLD AUTO: 0.08 X10(3) UL (ref 0–0.2)
BASOPHILS NFR BLD AUTO: 0.7 %
BUN BLD-MCNC: 31 MG/DL (ref 7–18)
BUN/CREAT SERPL: 14.6 (ref 10–20)
CALCIUM BLD-MCNC: 9 MG/DL (ref 8.5–10.1)
CHLORIDE SERPL-SCNC: 105 MMOL/L (ref 98–112)
CO2 SERPL-SCNC: 28 MMOL/L (ref 21–32)
CREAT BLD-MCNC: 2.13 MG/DL
DEPRECATED RDW RBC AUTO: 50.9 FL (ref 35.1–46.3)
EOSINOPHIL # BLD AUTO: 0.16 X10(3) UL (ref 0–0.7)
EOSINOPHIL NFR BLD AUTO: 1.3 %
ERYTHROCYTE [DISTWIDTH] IN BLOOD BY AUTOMATED COUNT: 14 % (ref 11–15)
GLUCOSE BLD-MCNC: 96 MG/DL (ref 70–99)
HCT VFR BLD AUTO: 31.2 %
HGB BLD-MCNC: 9.8 G/DL
IMM GRANULOCYTES # BLD AUTO: 0.04 X10(3) UL (ref 0–1)
IMM GRANULOCYTES NFR BLD: 0.3 %
LYMPHOCYTES # BLD AUTO: 1.45 X10(3) UL (ref 1–4)
LYMPHOCYTES NFR BLD AUTO: 11.9 %
MCH RBC QN AUTO: 31.3 PG (ref 26–34)
MCHC RBC AUTO-ENTMCNC: 31.4 G/DL (ref 31–37)
MCV RBC AUTO: 99.7 FL
MONOCYTES # BLD AUTO: 0.86 X10(3) UL (ref 0.1–1)
MONOCYTES NFR BLD AUTO: 7 %
NEUTROPHILS # BLD AUTO: 9.64 X10 (3) UL (ref 1.5–7.7)
NEUTROPHILS # BLD AUTO: 9.64 X10(3) UL (ref 1.5–7.7)
NEUTROPHILS NFR BLD AUTO: 78.8 %
OSMOLALITY SERPL CALC.SUM OF ELEC: 296 MOSM/KG (ref 275–295)
PLATELET # BLD AUTO: 330 10(3)UL (ref 150–450)
POTASSIUM SERPL-SCNC: 3.9 MMOL/L (ref 3.5–5.1)
RBC # BLD AUTO: 3.13 X10(6)UL
SODIUM SERPL-SCNC: 140 MMOL/L (ref 136–145)
WBC # BLD AUTO: 12.2 X10(3) UL (ref 4–11)

## 2022-02-15 PROCEDURE — 0B9F8ZX DRAINAGE OF RIGHT LOWER LUNG LOBE, VIA NATURAL OR ARTIFICIAL OPENING ENDOSCOPIC, DIAGNOSTIC: ICD-10-PCS | Performed by: INTERNAL MEDICINE

## 2022-02-15 PROCEDURE — 31625 BRONCHOSCOPY W/BIOPSY(S): CPT | Performed by: INTERNAL MEDICINE

## 2022-02-15 PROCEDURE — 0BB68ZX EXCISION OF RIGHT LOWER LOBE BRONCHUS, VIA NATURAL OR ARTIFICIAL OPENING ENDOSCOPIC, DIAGNOSTIC: ICD-10-PCS | Performed by: INTERNAL MEDICINE

## 2022-02-15 RX ORDER — PHENYLEPHRINE HCL 10 MG/ML
VIAL (ML) INJECTION AS NEEDED
Status: DISCONTINUED | OUTPATIENT
Start: 2022-02-15 | End: 2022-02-15 | Stop reason: SURG

## 2022-02-15 RX ORDER — SODIUM CHLORIDE, SODIUM LACTATE, POTASSIUM CHLORIDE, CALCIUM CHLORIDE 600; 310; 30; 20 MG/100ML; MG/100ML; MG/100ML; MG/100ML
INJECTION, SOLUTION INTRAVENOUS CONTINUOUS
Status: DISCONTINUED | OUTPATIENT
Start: 2022-02-15 | End: 2022-02-15

## 2022-02-15 RX ORDER — IPRATROPIUM BROMIDE AND ALBUTEROL SULFATE 2.5; .5 MG/3ML; MG/3ML
3 SOLUTION RESPIRATORY (INHALATION) EVERY 6 HOURS PRN
Status: DISCONTINUED | OUTPATIENT
Start: 2022-02-15 | End: 2022-02-15

## 2022-02-15 RX ADMIN — PHENYLEPHRINE HCL 50 MCG: 10 MG/ML VIAL (ML) INJECTION at 13:43:00

## 2022-02-15 RX ADMIN — SODIUM CHLORIDE, SODIUM LACTATE, POTASSIUM CHLORIDE, CALCIUM CHLORIDE: 600; 310; 30; 20 INJECTION, SOLUTION INTRAVENOUS at 13:25:00

## 2022-02-15 NOTE — PROCEDURES
Bronchoscopy Procedure Report     Preop diagnosis:       Lung mass / bronchogenic carcinoma   Postop diagnosis:      Same    ASA class: III     Informed Consent:  Informed consent was obtained. Risks/benefits/alternatives were discussed with the patient. All questions were answered. Procedure performed: Bronchoscopy with endobronchial biopsies    Anesthesia:   MAC anesthesia please refer to anesthesiologist note     Description of procedure: HR, BP, Sats monitored throughout the procedure. Oxygen was applied via NC. Bronchoscope was inserted via mouth  Normal vocal cord movement was noted  Trachea appeared normal  Teressa appeared normal.    Left lung with mild secretions /suctioned without difficulties and patent airway in the left lung with no endobronchial lesion or bleeding in the left lung    Right mainstem free of tumor  Right upper lobe patent    Large fungating /friable easily bleeding mass in the bronchus intermedius with complete occlusion of the right lower lobe bronchus consistent with bronchogenic carcinoma . Right middle lobe narrowed          Samples obtained:   1-endobronchial biopsies x 9 passes  2-BAL from the right lower lobe lobe area                  Estimated Blood Loss: About 15 cc      Findings were imaged and copy in the chart and other copy given to the patient  No post procedure CXR necessary. Patient tolerated the procedure well and was transferred to the recovery area.

## 2022-02-17 ENCOUNTER — TELEPHONE (OUTPATIENT)
Dept: PULMONOLOGY | Facility: CLINIC | Age: 82
End: 2022-02-17

## 2022-02-18 NOTE — TELEPHONE ENCOUNTER
Per pt's sister, Althea Dunn, she did receive results of bronchoscopy, made pt appt w/ Dr. Dena Kat for 2/22, & she will call her brother re: bronchoscopy results. Explained will enter referral for Dr. Dena Kat as requested. Encouraged her to have pt fill out ZIA during upcoming appt. She voiced understanding. Referral/order for oncology was placed.

## 2022-02-18 NOTE — TELEPHONE ENCOUNTER
Pt notified of referral to oncology- Dr. Torito Francisco. Phone # for Dr. Josesito Banuelos office given. Reassurance provided. Per pt neither him/his sister have received call re: bronch results. Explained will have Dr. Aren Nick call him on Monday when he returns to clinic. He voiced understanding. Stts he will not proceed with anything until he d/w MD.    Dr. Aren Nick- pls call pt. Thanks.

## 2022-02-20 PROBLEM — C34.91 SQUAMOUS CELL CARCINOMA OF RIGHT LUNG (HCC): Status: ACTIVE | Noted: 2022-02-20

## 2022-02-22 ENCOUNTER — OFFICE VISIT (OUTPATIENT)
Dept: HEMATOLOGY/ONCOLOGY | Facility: HOSPITAL | Age: 82
End: 2022-02-22
Attending: STUDENT IN AN ORGANIZED HEALTH CARE EDUCATION/TRAINING PROGRAM
Payer: MEDICARE

## 2022-02-22 VITALS
OXYGEN SATURATION: 95 % | WEIGHT: 198 LBS | SYSTOLIC BLOOD PRESSURE: 112 MMHG | TEMPERATURE: 98 F | BODY MASS INDEX: 26.24 KG/M2 | DIASTOLIC BLOOD PRESSURE: 52 MMHG | HEIGHT: 73 IN | RESPIRATION RATE: 16 BRPM | HEART RATE: 73 BPM

## 2022-02-22 DIAGNOSIS — C34.91 SQUAMOUS CELL CARCINOMA OF RIGHT LUNG (HCC): Primary | ICD-10-CM

## 2022-02-22 PROCEDURE — 99205 OFFICE O/P NEW HI 60 MIN: CPT | Performed by: STUDENT IN AN ORGANIZED HEALTH CARE EDUCATION/TRAINING PROGRAM

## 2022-02-23 ENCOUNTER — TELEPHONE (OUTPATIENT)
Dept: HEMATOLOGY/ONCOLOGY | Facility: HOSPITAL | Age: 82
End: 2022-02-23

## 2022-03-01 ENCOUNTER — TELEPHONE (OUTPATIENT)
Dept: HEMATOLOGY/ONCOLOGY | Facility: HOSPITAL | Age: 82
End: 2022-03-01

## 2022-03-01 NOTE — TELEPHONE ENCOUNTER
MRI order faxed, success fax confirmation received.   Writer attempted to call Angela Self to make her aware that order was faxed, phone rang 10x's then went to busy signal.

## 2022-03-01 NOTE — TELEPHONE ENCOUNTER
Willow ruiz from [facility]  Requesting an order for MRI    Return call to  [phone]    Fax order to  [phone]

## 2022-03-04 ENCOUNTER — DOCUMENTATION ONLY (OUTPATIENT)
Dept: HEMATOLOGY/ONCOLOGY | Facility: HOSPITAL | Age: 82
End: 2022-03-04

## 2022-03-04 NOTE — PROGRESS NOTES
Next generation genomic sequencing order sent in online to [facility] on February 23, 2022. Specimen [id number] collected February 15, 2022 to be tested. Requested: PDL1, MMR-IHC, xT-648 genes, and RNA expression.

## 2022-03-15 ENCOUNTER — OFFICE VISIT (OUTPATIENT)
Dept: HEMATOLOGY/ONCOLOGY | Facility: HOSPITAL | Age: 82
End: 2022-03-15
Attending: STUDENT IN AN ORGANIZED HEALTH CARE EDUCATION/TRAINING PROGRAM
Payer: MEDICARE

## 2022-03-15 VITALS
OXYGEN SATURATION: 95 % | RESPIRATION RATE: 18 BRPM | HEIGHT: 73 IN | HEART RATE: 76 BPM | TEMPERATURE: 98 F | SYSTOLIC BLOOD PRESSURE: 110 MMHG | WEIGHT: 203 LBS | BODY MASS INDEX: 26.9 KG/M2 | DIASTOLIC BLOOD PRESSURE: 51 MMHG

## 2022-03-15 DIAGNOSIS — Z91.89 AT RISK FOR SIDE EFFECT OF MEDICATION: ICD-10-CM

## 2022-03-15 DIAGNOSIS — Z51.81 THERAPEUTIC DRUG MONITORING: ICD-10-CM

## 2022-03-15 DIAGNOSIS — Z51.81 ENCOUNTER FOR THERAPEUTIC DRUG MONITORING: Primary | ICD-10-CM

## 2022-03-15 DIAGNOSIS — Z51.12 ENCOUNTER FOR ANTINEOPLASTIC IMMUNOTHERAPY: ICD-10-CM

## 2022-03-15 DIAGNOSIS — Z29.8 ENCOUNTER FOR IMMUNOTHERAPY: ICD-10-CM

## 2022-03-15 DIAGNOSIS — C34.91 SQUAMOUS CELL CARCINOMA OF RIGHT LUNG (HCC): ICD-10-CM

## 2022-03-15 PROCEDURE — 99214 OFFICE O/P EST MOD 30 MIN: CPT | Performed by: STUDENT IN AN ORGANIZED HEALTH CARE EDUCATION/TRAINING PROGRAM

## 2022-03-29 ENCOUNTER — OFFICE VISIT (OUTPATIENT)
Dept: HEMATOLOGY/ONCOLOGY | Facility: HOSPITAL | Age: 82
End: 2022-03-29
Attending: STUDENT IN AN ORGANIZED HEALTH CARE EDUCATION/TRAINING PROGRAM
Payer: MEDICARE

## 2022-03-29 VITALS
DIASTOLIC BLOOD PRESSURE: 49 MMHG | HEIGHT: 73 IN | HEART RATE: 71 BPM | TEMPERATURE: 98 F | SYSTOLIC BLOOD PRESSURE: 107 MMHG | BODY MASS INDEX: 26.64 KG/M2 | RESPIRATION RATE: 18 BRPM | OXYGEN SATURATION: 98 % | WEIGHT: 201 LBS

## 2022-03-29 DIAGNOSIS — D64.9 ANEMIA, UNSPECIFIED TYPE: ICD-10-CM

## 2022-03-29 DIAGNOSIS — Z29.8 ENCOUNTER FOR IMMUNOTHERAPY: ICD-10-CM

## 2022-03-29 DIAGNOSIS — C34.91 SQUAMOUS CELL CARCINOMA OF RIGHT LUNG (HCC): ICD-10-CM

## 2022-03-29 DIAGNOSIS — C34.91 SQUAMOUS CELL CARCINOMA OF RIGHT LUNG (HCC): Primary | ICD-10-CM

## 2022-03-29 DIAGNOSIS — Z51.81 ENCOUNTER FOR THERAPEUTIC DRUG MONITORING: ICD-10-CM

## 2022-03-29 DIAGNOSIS — G89.3 CANCER ASSOCIATED PAIN: ICD-10-CM

## 2022-03-29 LAB
ALBUMIN SERPL-MCNC: 2.8 G/DL (ref 3.4–5)
ALBUMIN/GLOB SERPL: 0.6 {RATIO} (ref 1–2)
ALP LIVER SERPL-CCNC: 95 U/L
ALT SERPL-CCNC: 12 U/L
ANION GAP SERPL CALC-SCNC: 5 MMOL/L (ref 0–18)
AST SERPL-CCNC: 10 U/L (ref 15–37)
BASOPHILS # BLD AUTO: 0.07 X10(3) UL (ref 0–0.2)
BASOPHILS NFR BLD AUTO: 0.6 %
BILIRUB SERPL-MCNC: 0.6 MG/DL (ref 0.1–2)
BUN BLD-MCNC: 27 MG/DL (ref 7–18)
BUN/CREAT SERPL: 11.4 (ref 10–20)
CALCIUM BLD-MCNC: 8.7 MG/DL (ref 8.5–10.1)
CHLORIDE SERPL-SCNC: 102 MMOL/L (ref 98–112)
CO2 SERPL-SCNC: 30 MMOL/L (ref 21–32)
CREAT BLD-MCNC: 2.37 MG/DL
DEPRECATED HBV CORE AB SER IA-ACNC: 53.5 NG/ML
DEPRECATED RDW RBC AUTO: 52.9 FL (ref 35.1–46.3)
EOSINOPHIL # BLD AUTO: 0.08 X10(3) UL (ref 0–0.7)
EOSINOPHIL NFR BLD AUTO: 0.7 %
ERYTHROCYTE [DISTWIDTH] IN BLOOD BY AUTOMATED COUNT: 14.4 % (ref 11–15)
GLOBULIN PLAS-MCNC: 4.7 G/DL (ref 2.8–4.4)
GLUCOSE BLD-MCNC: 119 MG/DL (ref 70–99)
HCT VFR BLD AUTO: 28.6 %
HGB BLD-MCNC: 8.7 G/DL
IMM GRANULOCYTES # BLD AUTO: 0.04 X10(3) UL (ref 0–1)
IMM GRANULOCYTES NFR BLD: 0.4 %
IRON SATN MFR SERPL: 14 %
IRON SERPL-MCNC: 35 UG/DL
LYMPHOCYTES # BLD AUTO: 1.33 X10(3) UL (ref 1–4)
LYMPHOCYTES NFR BLD AUTO: 12.3 %
MCH RBC QN AUTO: 30.5 PG (ref 26–34)
MCHC RBC AUTO-ENTMCNC: 30.4 G/DL (ref 31–37)
MCV RBC AUTO: 100.4 FL
MONOCYTES # BLD AUTO: 0.59 X10(3) UL (ref 0.1–1)
MONOCYTES NFR BLD AUTO: 5.5 %
NEUTROPHILS # BLD AUTO: 8.7 X10 (3) UL (ref 1.5–7.7)
NEUTROPHILS # BLD AUTO: 8.7 X10(3) UL (ref 1.5–7.7)
NEUTROPHILS NFR BLD AUTO: 80.5 %
OSMOLALITY SERPL CALC.SUM OF ELEC: 290 MOSM/KG (ref 275–295)
PLATELET # BLD AUTO: 315 10(3)UL (ref 150–450)
POTASSIUM SERPL-SCNC: 3.8 MMOL/L (ref 3.5–5.1)
PROT SERPL-MCNC: 7.5 G/DL (ref 6.4–8.2)
RBC # BLD AUTO: 2.85 X10(6)UL
SODIUM SERPL-SCNC: 137 MMOL/L (ref 136–145)
T4 FREE SERPL-MCNC: 1.2 NG/DL (ref 0.8–1.7)
TIBC SERPL-MCNC: 258 UG/DL (ref 240–450)
TRANSFERRIN SERPL-MCNC: 173 MG/DL (ref 200–360)
TSI SER-ACNC: 1.46 MIU/ML (ref 0.36–3.74)
WBC # BLD AUTO: 10.8 X10(3) UL (ref 4–11)

## 2022-03-29 PROCEDURE — 83540 ASSAY OF IRON: CPT

## 2022-03-29 PROCEDURE — 80053 COMPREHEN METABOLIC PANEL: CPT

## 2022-03-29 PROCEDURE — 99215 OFFICE O/P EST HI 40 MIN: CPT | Performed by: STUDENT IN AN ORGANIZED HEALTH CARE EDUCATION/TRAINING PROGRAM

## 2022-03-29 PROCEDURE — 84439 ASSAY OF FREE THYROXINE: CPT

## 2022-03-29 PROCEDURE — 84443 ASSAY THYROID STIM HORMONE: CPT

## 2022-03-29 PROCEDURE — 85025 COMPLETE CBC W/AUTO DIFF WBC: CPT

## 2022-03-29 PROCEDURE — 82728 ASSAY OF FERRITIN: CPT

## 2022-03-29 PROCEDURE — 36415 COLL VENOUS BLD VENIPUNCTURE: CPT

## 2022-03-29 PROCEDURE — 84466 ASSAY OF TRANSFERRIN: CPT

## 2022-04-07 ENCOUNTER — NURSE ONLY (OUTPATIENT)
Dept: HEMATOLOGY/ONCOLOGY | Facility: HOSPITAL | Age: 82
End: 2022-04-07
Attending: STUDENT IN AN ORGANIZED HEALTH CARE EDUCATION/TRAINING PROGRAM
Payer: MEDICARE

## 2022-04-07 ENCOUNTER — OFFICE VISIT (OUTPATIENT)
Dept: HEMATOLOGY/ONCOLOGY | Facility: HOSPITAL | Age: 82
End: 2022-04-07
Attending: NURSE PRACTITIONER

## 2022-04-07 VITALS
OXYGEN SATURATION: 93 % | RESPIRATION RATE: 16 BRPM | DIASTOLIC BLOOD PRESSURE: 50 MMHG | HEART RATE: 71 BPM | SYSTOLIC BLOOD PRESSURE: 110 MMHG | TEMPERATURE: 98 F

## 2022-04-07 DIAGNOSIS — C34.91 SQUAMOUS CELL CARCINOMA OF RIGHT LUNG (HCC): Primary | ICD-10-CM

## 2022-04-07 PROCEDURE — 99215 OFFICE O/P EST HI 40 MIN: CPT | Performed by: NURSE PRACTITIONER

## 2022-04-07 PROCEDURE — 96413 CHEMO IV INFUSION 1 HR: CPT

## 2022-04-07 NOTE — PROGRESS NOTES
Pt here for C1D1 Keytruda. Arrives Via wheelchair, accompanied by Family member  sister       Modifications in dose or schedule: Yes     Frequency of blood return and site check throughout administration: Prior to administration, Prior to each drug and At completion of therapy   Discharged to Home, Via wheelchair, accompanied by:Family member    Outpatient Oncology Care Plan  Problem list:  thrombocytopenia  fatigue  knowledge deficit  nausea and vomiting  Problems related to:    chemotherapy  Interventions:  monitor for signs/symptoms of infection  patient/family supported  encourage activity as tolerated  provided general teaching  Expected outcomes:  family support/coping well  patient supported/coping well  safe in environment  verbalize how to care for self  Progress towards outcome:  making progress    Education Record    Learner:  Patient  Barriers / Limitations:  Education level, Emotional factors and Fatigue  Method:  Brief focused, Discussion and Printed material  Outcome:  Needs reinforcement  Comments:  Reinforced education.

## 2022-04-08 ENCOUNTER — TELEPHONE (OUTPATIENT)
Dept: HEMATOLOGY/ONCOLOGY | Facility: HOSPITAL | Age: 82
End: 2022-04-08

## 2022-04-08 NOTE — TELEPHONE ENCOUNTER
Toxicities: C1 D1 Pembrolizumab on 4/7/2022    I attempted to reach Eladio Coffman to see how he is feeling after his first treatment. I left a voice mail message asking him to please return my call at his earliest convenience.

## 2022-04-08 NOTE — TELEPHONE ENCOUNTER
Zakirandestinee Ortiz reports that he feels tired today. No other side effects. I encouraged him to make sure he is drinking 64-80 oz of caffeine free fluids daily. I also encouraged 5-6 small meals with protein. . I encouraged him to please call the office if he is not feeling well or has any questions or concerns. He agreed.

## 2022-04-28 ENCOUNTER — OFFICE VISIT (OUTPATIENT)
Dept: HEMATOLOGY/ONCOLOGY | Facility: HOSPITAL | Age: 82
End: 2022-04-28
Attending: NURSE PRACTITIONER

## 2022-04-28 ENCOUNTER — NURSE ONLY (OUTPATIENT)
Dept: HEMATOLOGY/ONCOLOGY | Facility: HOSPITAL | Age: 82
End: 2022-04-28
Attending: STUDENT IN AN ORGANIZED HEALTH CARE EDUCATION/TRAINING PROGRAM
Payer: MEDICARE

## 2022-04-28 VITALS
TEMPERATURE: 98 F | WEIGHT: 197.81 LBS | OXYGEN SATURATION: 93 % | BODY MASS INDEX: 26 KG/M2 | HEART RATE: 73 BPM | DIASTOLIC BLOOD PRESSURE: 59 MMHG | SYSTOLIC BLOOD PRESSURE: 120 MMHG | RESPIRATION RATE: 20 BRPM

## 2022-04-28 VITALS
DIASTOLIC BLOOD PRESSURE: 59 MMHG | TEMPERATURE: 98 F | OXYGEN SATURATION: 93 % | HEART RATE: 73 BPM | RESPIRATION RATE: 20 BRPM | HEIGHT: 73 IN | SYSTOLIC BLOOD PRESSURE: 120 MMHG | BODY MASS INDEX: 26.22 KG/M2 | WEIGHT: 197.81 LBS

## 2022-04-28 DIAGNOSIS — Z29.8 ENCOUNTER FOR IMMUNOTHERAPY: ICD-10-CM

## 2022-04-28 DIAGNOSIS — C34.91 SQUAMOUS CELL CARCINOMA OF RIGHT LUNG (HCC): Primary | ICD-10-CM

## 2022-04-28 DIAGNOSIS — Z51.12 ENCOUNTER FOR ANTINEOPLASTIC IMMUNOTHERAPY: ICD-10-CM

## 2022-04-28 DIAGNOSIS — D64.9 ANEMIA, UNSPECIFIED TYPE: ICD-10-CM

## 2022-04-28 LAB
ALBUMIN SERPL-MCNC: 2.5 G/DL (ref 3.4–5)
ALBUMIN/GLOB SERPL: 0.5 {RATIO} (ref 1–2)
ALP LIVER SERPL-CCNC: 80 U/L
ALT SERPL-CCNC: 13 U/L
ANION GAP SERPL CALC-SCNC: 3 MMOL/L (ref 0–18)
AST SERPL-CCNC: 11 U/L (ref 15–37)
BASOPHILS # BLD AUTO: 0.06 X10(3) UL (ref 0–0.2)
BASOPHILS NFR BLD AUTO: 0.5 %
BILIRUB SERPL-MCNC: 0.7 MG/DL (ref 0.1–2)
BUN BLD-MCNC: 25 MG/DL (ref 7–18)
BUN/CREAT SERPL: 13.1 (ref 10–20)
CALCIUM BLD-MCNC: 8.6 MG/DL (ref 8.5–10.1)
CHLORIDE SERPL-SCNC: 103 MMOL/L (ref 98–112)
CO2 SERPL-SCNC: 32 MMOL/L (ref 21–32)
CREAT BLD-MCNC: 1.91 MG/DL
DEPRECATED RDW RBC AUTO: 54.5 FL (ref 35.1–46.3)
EOSINOPHIL # BLD AUTO: 0.17 X10(3) UL (ref 0–0.7)
EOSINOPHIL NFR BLD AUTO: 1.4 %
ERYTHROCYTE [DISTWIDTH] IN BLOOD BY AUTOMATED COUNT: 14.6 % (ref 11–15)
GLOBULIN PLAS-MCNC: 5.1 G/DL (ref 2.8–4.4)
GLUCOSE BLD-MCNC: 81 MG/DL (ref 70–99)
HCT VFR BLD AUTO: 28.3 %
HGB BLD-MCNC: 8.4 G/DL
IMM GRANULOCYTES # BLD AUTO: 0.03 X10(3) UL (ref 0–1)
IMM GRANULOCYTES NFR BLD: 0.3 %
LYMPHOCYTES # BLD AUTO: 1.46 X10(3) UL (ref 1–4)
LYMPHOCYTES NFR BLD AUTO: 12.4 %
MCH RBC QN AUTO: 30.2 PG (ref 26–34)
MCHC RBC AUTO-ENTMCNC: 29.7 G/DL (ref 31–37)
MCV RBC AUTO: 101.8 FL
MONOCYTES # BLD AUTO: 0.78 X10(3) UL (ref 0.1–1)
MONOCYTES NFR BLD AUTO: 6.6 %
NEUTROPHILS # BLD AUTO: 9.24 X10 (3) UL (ref 1.5–7.7)
NEUTROPHILS # BLD AUTO: 9.24 X10(3) UL (ref 1.5–7.7)
NEUTROPHILS NFR BLD AUTO: 78.8 %
OSMOLALITY SERPL CALC.SUM OF ELEC: 289 MOSM/KG (ref 275–295)
PLATELET # BLD AUTO: 326 10(3)UL (ref 150–450)
POTASSIUM SERPL-SCNC: 4.1 MMOL/L (ref 3.5–5.1)
PROT SERPL-MCNC: 7.6 G/DL (ref 6.4–8.2)
RBC # BLD AUTO: 2.78 X10(6)UL
SODIUM SERPL-SCNC: 138 MMOL/L (ref 136–145)
WBC # BLD AUTO: 11.7 X10(3) UL (ref 4–11)

## 2022-04-28 PROCEDURE — 96413 CHEMO IV INFUSION 1 HR: CPT

## 2022-04-28 PROCEDURE — 85025 COMPLETE CBC W/AUTO DIFF WBC: CPT

## 2022-04-28 PROCEDURE — 99215 OFFICE O/P EST HI 40 MIN: CPT | Performed by: STUDENT IN AN ORGANIZED HEALTH CARE EDUCATION/TRAINING PROGRAM

## 2022-04-28 PROCEDURE — 80053 COMPREHEN METABOLIC PANEL: CPT

## 2022-04-28 NOTE — PROGRESS NOTES
Pt here for Sam Murphy. Arrives Via wheelchair, accompanied by Family member         Modifications in dose or schedule: no     Frequency of blood return and site check throughout administration: Prior to administration, Prior to each drug and At completion of therapy   Discharged to Home, Via wheelchair, accompanied by:Family member  REID acuña'kan and wrapped in [address]. AVS provided. Outpatient Oncology Care Plan  Problem list:  thrombocytopenia  fatigue  knowledge deficit  nausea and vomiting  Problems related to:    chemotherapy  Interventions:  monitor for signs/symptoms of infection  patient/family supported  encourage activity as tolerated  provided general teaching  Expected outcomes:  family support/coping well  patient supported/coping well  safe in environment  verbalize how to care for self  Progress towards outcome:  making progress    Education Record    Learner:  Patient  Barriers / Limitations:  Education level, Emotional factors and Fatigue  Method:  Brief focused, Discussion and Printed material  Outcome:  Needs reinforcement  Comments:  Reinforced education.

## 2022-05-19 ENCOUNTER — NURSE ONLY (OUTPATIENT)
Dept: HEMATOLOGY/ONCOLOGY | Facility: HOSPITAL | Age: 82
End: 2022-05-19
Attending: STUDENT IN AN ORGANIZED HEALTH CARE EDUCATION/TRAINING PROGRAM
Payer: MEDICARE

## 2022-05-19 ENCOUNTER — OFFICE VISIT (OUTPATIENT)
Dept: HEMATOLOGY/ONCOLOGY | Facility: HOSPITAL | Age: 82
End: 2022-05-19
Attending: NURSE PRACTITIONER

## 2022-05-19 VITALS
DIASTOLIC BLOOD PRESSURE: 52 MMHG | HEIGHT: 73 IN | OXYGEN SATURATION: 95 % | RESPIRATION RATE: 18 BRPM | BODY MASS INDEX: 26.37 KG/M2 | WEIGHT: 199 LBS | TEMPERATURE: 98 F | HEART RATE: 79 BPM | SYSTOLIC BLOOD PRESSURE: 114 MMHG

## 2022-05-19 VITALS
OXYGEN SATURATION: 95 % | HEART RATE: 79 BPM | DIASTOLIC BLOOD PRESSURE: 52 MMHG | BODY MASS INDEX: 26.37 KG/M2 | TEMPERATURE: 98 F | SYSTOLIC BLOOD PRESSURE: 114 MMHG | WEIGHT: 199 LBS | RESPIRATION RATE: 18 BRPM | HEIGHT: 73 IN

## 2022-05-19 DIAGNOSIS — Z51.5 PALLIATIVE CARE ENCOUNTER: ICD-10-CM

## 2022-05-19 DIAGNOSIS — C34.91 SQUAMOUS CELL CARCINOMA OF RIGHT LUNG (HCC): ICD-10-CM

## 2022-05-19 DIAGNOSIS — C34.01 MALIGNANT NEOPLASM OF RIGHT MAIN BRONCHUS (HCC): ICD-10-CM

## 2022-05-19 DIAGNOSIS — Z71.89 ADVANCE CARE PLANNING: Primary | ICD-10-CM

## 2022-05-19 DIAGNOSIS — Z71.89 GOALS OF CARE, COUNSELING/DISCUSSION: ICD-10-CM

## 2022-05-19 DIAGNOSIS — C34.91 SQUAMOUS CELL CARCINOMA OF RIGHT LUNG (HCC): Primary | ICD-10-CM

## 2022-05-19 LAB
ALBUMIN SERPL-MCNC: 2.8 G/DL (ref 3.4–5)
ALBUMIN/GLOB SERPL: 0.6 {RATIO} (ref 1–2)
ALP LIVER SERPL-CCNC: 80 U/L
ALT SERPL-CCNC: 15 U/L
ANION GAP SERPL CALC-SCNC: 4 MMOL/L (ref 0–18)
AST SERPL-CCNC: 17 U/L (ref 15–37)
BASOPHILS # BLD AUTO: 0.08 X10(3) UL (ref 0–0.2)
BASOPHILS NFR BLD AUTO: 0.9 %
BILIRUB SERPL-MCNC: 0.6 MG/DL (ref 0.1–2)
BUN BLD-MCNC: 25 MG/DL (ref 7–18)
BUN/CREAT SERPL: 15 (ref 10–20)
CALCIUM BLD-MCNC: 8.8 MG/DL (ref 8.5–10.1)
CHLORIDE SERPL-SCNC: 104 MMOL/L (ref 98–112)
CO2 SERPL-SCNC: 31 MMOL/L (ref 21–32)
CREAT BLD-MCNC: 1.67 MG/DL
DEPRECATED RDW RBC AUTO: 53.3 FL (ref 35.1–46.3)
EOSINOPHIL # BLD AUTO: 0.11 X10(3) UL (ref 0–0.7)
EOSINOPHIL NFR BLD AUTO: 1.2 %
ERYTHROCYTE [DISTWIDTH] IN BLOOD BY AUTOMATED COUNT: 14.6 % (ref 11–15)
GLOBULIN PLAS-MCNC: 4.8 G/DL (ref 2.8–4.4)
GLUCOSE BLD-MCNC: 100 MG/DL (ref 70–99)
HCT VFR BLD AUTO: 28.8 %
HGB BLD-MCNC: 8.7 G/DL
IMM GRANULOCYTES # BLD AUTO: 0.03 X10(3) UL (ref 0–1)
IMM GRANULOCYTES NFR BLD: 0.3 %
LYMPHOCYTES # BLD AUTO: 1.26 X10(3) UL (ref 1–4)
LYMPHOCYTES NFR BLD AUTO: 13.7 %
MCH RBC QN AUTO: 30.3 PG (ref 26–34)
MCHC RBC AUTO-ENTMCNC: 30.2 G/DL (ref 31–37)
MCV RBC AUTO: 100.3 FL
MONOCYTES # BLD AUTO: 0.64 X10(3) UL (ref 0.1–1)
MONOCYTES NFR BLD AUTO: 7 %
NEUTROPHILS # BLD AUTO: 7.08 X10 (3) UL (ref 1.5–7.7)
NEUTROPHILS # BLD AUTO: 7.08 X10(3) UL (ref 1.5–7.7)
NEUTROPHILS NFR BLD AUTO: 76.9 %
OSMOLALITY SERPL CALC.SUM OF ELEC: 292 MOSM/KG (ref 275–295)
PLATELET # BLD AUTO: 288 10(3)UL (ref 150–450)
POTASSIUM SERPL-SCNC: 4.1 MMOL/L (ref 3.5–5.1)
PROT SERPL-MCNC: 7.6 G/DL (ref 6.4–8.2)
RBC # BLD AUTO: 2.87 X10(6)UL
SODIUM SERPL-SCNC: 139 MMOL/L (ref 136–145)
T4 FREE SERPL-MCNC: 1.2 NG/DL (ref 0.8–1.7)
TSI SER-ACNC: 2.54 MIU/ML (ref 0.36–3.74)
WBC # BLD AUTO: 9.2 X10(3) UL (ref 4–11)

## 2022-05-19 PROCEDURE — 84443 ASSAY THYROID STIM HORMONE: CPT

## 2022-05-19 PROCEDURE — 99215 OFFICE O/P EST HI 40 MIN: CPT | Performed by: NURSE PRACTITIONER

## 2022-05-19 PROCEDURE — 96413 CHEMO IV INFUSION 1 HR: CPT

## 2022-05-19 PROCEDURE — 84439 ASSAY OF FREE THYROXINE: CPT

## 2022-05-19 PROCEDURE — 85025 COMPLETE CBC W/AUTO DIFF WBC: CPT

## 2022-05-19 PROCEDURE — 80053 COMPREHEN METABOLIC PANEL: CPT

## 2022-05-19 PROCEDURE — 99215 OFFICE O/P EST HI 40 MIN: CPT | Performed by: STUDENT IN AN ORGANIZED HEALTH CARE EDUCATION/TRAINING PROGRAM

## 2022-05-19 NOTE — PROGRESS NOTES
Pt here for C3D1 Keytruda. Arrives Via wheelchair, accompanied by Family member  sister from MD visit. Modifications in dose or schedule: no     PIV established in lab - present blood return noted. Frequency of blood return and site check throughout administration: Prior to administration, Prior to each drug and At completion of therapy   PIV removed, site covered with gauze and coban. Discharged to Home, Via wheelchair, accompanied by:Family member. AVS provided.     Outpatient Oncology Care Plan  Problem list:  thrombocytopenia  fatigue  knowledge deficit  nausea and vomiting  Problems related to:    chemotherapy  Interventions:  monitor for signs/symptoms of infection  patient/family supported  encourage activity as tolerated  provided general teaching  Expected outcomes:  family support/coping well  patient supported/coping well  safe in environment  verbalize how to care for self  Progress towards outcome:  making progress    Education Record    Learner:  Patient  Barriers / Limitations:  Education level, Emotional factors and Fatigue  Method:  Brief focused, Discussion and Printed material  Outcome:  Needs reinforcement  Comments:

## 2022-06-07 ENCOUNTER — TELEPHONE (OUTPATIENT)
Dept: HEMATOLOGY/ONCOLOGY | Facility: HOSPITAL | Age: 82
End: 2022-06-07

## 2022-06-08 ENCOUNTER — APPOINTMENT (OUTPATIENT)
Dept: HEMATOLOGY/ONCOLOGY | Facility: HOSPITAL | Age: 82
End: 2022-06-08
Attending: NURSE PRACTITIONER
Payer: MEDICARE

## 2022-06-08 ENCOUNTER — APPOINTMENT (OUTPATIENT)
Dept: HEMATOLOGY/ONCOLOGY | Facility: HOSPITAL | Age: 82
End: 2022-06-08
Attending: STUDENT IN AN ORGANIZED HEALTH CARE EDUCATION/TRAINING PROGRAM
Payer: MEDICARE

## 2022-06-09 ENCOUNTER — NURSE ONLY (OUTPATIENT)
Dept: HEMATOLOGY/ONCOLOGY | Facility: HOSPITAL | Age: 82
End: 2022-06-09
Attending: STUDENT IN AN ORGANIZED HEALTH CARE EDUCATION/TRAINING PROGRAM
Payer: MEDICARE

## 2022-06-09 ENCOUNTER — OFFICE VISIT (OUTPATIENT)
Dept: HEMATOLOGY/ONCOLOGY | Facility: HOSPITAL | Age: 82
End: 2022-06-09
Attending: NURSE PRACTITIONER
Payer: MEDICARE

## 2022-06-09 ENCOUNTER — TELEPHONE (OUTPATIENT)
Dept: HEMATOLOGY/ONCOLOGY | Facility: HOSPITAL | Age: 82
End: 2022-06-09

## 2022-06-09 ENCOUNTER — OFFICE VISIT (OUTPATIENT)
Dept: HEMATOLOGY/ONCOLOGY | Facility: HOSPITAL | Age: 82
End: 2022-06-09
Attending: PHYSICIAN ASSISTANT
Payer: MEDICARE

## 2022-06-09 VITALS
WEIGHT: 197 LBS | SYSTOLIC BLOOD PRESSURE: 111 MMHG | BODY MASS INDEX: 26.11 KG/M2 | HEART RATE: 73 BPM | TEMPERATURE: 98 F | RESPIRATION RATE: 16 BRPM | DIASTOLIC BLOOD PRESSURE: 55 MMHG | HEIGHT: 73 IN | OXYGEN SATURATION: 95 %

## 2022-06-09 VITALS
DIASTOLIC BLOOD PRESSURE: 55 MMHG | BODY MASS INDEX: 26.11 KG/M2 | RESPIRATION RATE: 16 BRPM | SYSTOLIC BLOOD PRESSURE: 111 MMHG | OXYGEN SATURATION: 95 % | HEIGHT: 73 IN | TEMPERATURE: 98 F | WEIGHT: 197 LBS | HEART RATE: 73 BPM

## 2022-06-09 VITALS
TEMPERATURE: 98 F | HEIGHT: 73 IN | WEIGHT: 197 LBS | DIASTOLIC BLOOD PRESSURE: 55 MMHG | RESPIRATION RATE: 16 BRPM | SYSTOLIC BLOOD PRESSURE: 111 MMHG | BODY MASS INDEX: 26.11 KG/M2 | HEART RATE: 73 BPM | OXYGEN SATURATION: 95 %

## 2022-06-09 DIAGNOSIS — Z51.5 PALLIATIVE CARE ENCOUNTER: ICD-10-CM

## 2022-06-09 DIAGNOSIS — C34.91 SQUAMOUS CELL CARCINOMA OF RIGHT LUNG (HCC): ICD-10-CM

## 2022-06-09 DIAGNOSIS — C34.91 SQUAMOUS CELL CARCINOMA OF RIGHT LUNG (HCC): Primary | ICD-10-CM

## 2022-06-09 DIAGNOSIS — D50.0 IRON DEFICIENCY ANEMIA DUE TO CHRONIC BLOOD LOSS: ICD-10-CM

## 2022-06-09 DIAGNOSIS — Z71.89 ADVANCE CARE PLANNING: ICD-10-CM

## 2022-06-09 DIAGNOSIS — D50.0 IRON DEFICIENCY ANEMIA DUE TO CHRONIC BLOOD LOSS: Primary | ICD-10-CM

## 2022-06-09 DIAGNOSIS — Z71.89 GOALS OF CARE, COUNSELING/DISCUSSION: Primary | ICD-10-CM

## 2022-06-09 LAB
ALBUMIN SERPL-MCNC: 3 G/DL (ref 3.4–5)
ALBUMIN/GLOB SERPL: 0.7 {RATIO} (ref 1–2)
ALP LIVER SERPL-CCNC: 76 U/L
ALT SERPL-CCNC: 16 U/L
ANION GAP SERPL CALC-SCNC: 8 MMOL/L (ref 0–18)
AST SERPL-CCNC: 13 U/L (ref 15–37)
BASOPHILS # BLD AUTO: 0.07 X10(3) UL (ref 0–0.2)
BASOPHILS NFR BLD AUTO: 0.7 %
BILIRUB SERPL-MCNC: 0.6 MG/DL (ref 0.1–2)
BUN BLD-MCNC: 28 MG/DL (ref 7–18)
BUN/CREAT SERPL: 16.2 (ref 10–20)
CALCIUM BLD-MCNC: 8.8 MG/DL (ref 8.5–10.1)
CHLORIDE SERPL-SCNC: 104 MMOL/L (ref 98–112)
CO2 SERPL-SCNC: 28 MMOL/L (ref 21–32)
CREAT BLD-MCNC: 1.73 MG/DL
DEPRECATED RDW RBC AUTO: 56.3 FL (ref 35.1–46.3)
EOSINOPHIL # BLD AUTO: 0.14 X10(3) UL (ref 0–0.7)
EOSINOPHIL NFR BLD AUTO: 1.4 %
ERYTHROCYTE [DISTWIDTH] IN BLOOD BY AUTOMATED COUNT: 14.9 % (ref 11–15)
GLOBULIN PLAS-MCNC: 4.3 G/DL (ref 2.8–4.4)
GLUCOSE BLD-MCNC: 105 MG/DL (ref 70–99)
HCT VFR BLD AUTO: 30 %
HGB BLD-MCNC: 9 G/DL
IMM GRANULOCYTES # BLD AUTO: 0.03 X10(3) UL (ref 0–1)
IMM GRANULOCYTES NFR BLD: 0.3 %
IRON SATN MFR SERPL: 11 %
IRON SERPL-MCNC: 34 UG/DL
LYMPHOCYTES # BLD AUTO: 1.23 X10(3) UL (ref 1–4)
LYMPHOCYTES NFR BLD AUTO: 11.9 %
MCH RBC QN AUTO: 30.6 PG (ref 26–34)
MCHC RBC AUTO-ENTMCNC: 30 G/DL (ref 31–37)
MCV RBC AUTO: 102 FL
MONOCYTES # BLD AUTO: 0.85 X10(3) UL (ref 0.1–1)
MONOCYTES NFR BLD AUTO: 8.2 %
NEUTROPHILS # BLD AUTO: 8.03 X10 (3) UL (ref 1.5–7.7)
NEUTROPHILS # BLD AUTO: 8.03 X10(3) UL (ref 1.5–7.7)
NEUTROPHILS NFR BLD AUTO: 77.5 %
OSMOLALITY SERPL CALC.SUM OF ELEC: 296 MOSM/KG (ref 275–295)
PLATELET # BLD AUTO: 230 10(3)UL (ref 150–450)
POTASSIUM SERPL-SCNC: 4.3 MMOL/L (ref 3.5–5.1)
PROT SERPL-MCNC: 7.3 G/DL (ref 6.4–8.2)
RBC # BLD AUTO: 2.94 X10(6)UL
SODIUM SERPL-SCNC: 140 MMOL/L (ref 136–145)
TIBC SERPL-MCNC: 322 UG/DL (ref 240–450)
TRANSFERRIN SERPL-MCNC: 216 MG/DL (ref 200–360)
WBC # BLD AUTO: 10.4 X10(3) UL (ref 4–11)

## 2022-06-09 PROCEDURE — 84466 ASSAY OF TRANSFERRIN: CPT

## 2022-06-09 PROCEDURE — 80053 COMPREHEN METABOLIC PANEL: CPT

## 2022-06-09 PROCEDURE — 99214 OFFICE O/P EST MOD 30 MIN: CPT | Performed by: NURSE PRACTITIONER

## 2022-06-09 PROCEDURE — 85025 COMPLETE CBC W/AUTO DIFF WBC: CPT

## 2022-06-09 PROCEDURE — 99215 OFFICE O/P EST HI 40 MIN: CPT | Performed by: PHYSICIAN ASSISTANT

## 2022-06-09 PROCEDURE — 83540 ASSAY OF IRON: CPT

## 2022-06-09 PROCEDURE — 96413 CHEMO IV INFUSION 1 HR: CPT

## 2022-06-09 NOTE — PATIENT INSTRUCTIONS
1. Proceed with cycle 4    2. Protein shake options:  Boost, Ensure, Premiere, [employer], [employer], [employer]; [employer], [employer], [employer], [employer], Boost pudding. Super pudding:  Substitute 1 or 2 cups of the milk when making any flavor instant pudding and use Ensure or Boost instead. 3.  For itching, may try hydrrocortisone cream - over the counter - 2-3 times daily as needed (Brand name Lee-10)    4. Rechecking iron studies. Anemia may be related to kidney dysfunction    5. PET scan after this cycle. Scheduled for Tuesday, 6/28/22. May call [phone] to revise as needed    6. Call as needed    7.   Follow-up in 3 weeks

## 2022-06-09 NOTE — TELEPHONE ENCOUNTER
Call received, in which patient stated he was returning a all from writer about treatment/appointment options. Writer informed patient that she did not call him yesterday or today, had not called him since Tuesday to re-schedule appointments. Appointment times for today 6/9 confirmed with patient, in which he verbalized understanding.

## 2022-06-09 NOTE — TELEPHONE ENCOUNTER
Discussed iron panel with patient.  % sat 11 and trending down in past 2 months. Start ferrous sulfate 325 mg once daily. If tolerated after 1 week, increase to BID. Recheck iron studies in 2-3 months. Reviewed GI side effects during exam.  Call if iron supplement is not tolerated.

## 2022-06-09 NOTE — PROGRESS NOTES
Pt here for C4D1 Keytruda. Arrives Via wheelchair, accompanied by Family member  sister from MD visit. Modifications in dose or schedule: No     PIV established in lab - present blood return noted. Frequency of blood return and site check throughout administration: Prior to administration, Prior to each drug and At completion of therapy   PIV removed, site covered with gauze and coban. Discharged to Home, Via wheelchair, accompanied by:Family member. AVS provided. Outpatient Oncology Care Plan  Problem list:  thrombocytopenia  fatigue  knowledge deficit  nausea and vomiting  Problems related to:    chemotherapy  Interventions:  monitor for signs/symptoms of infection  patient/family supported  encourage activity as tolerated  provided general teaching  Expected outcomes:  family support/coping well  patient supported/coping well  safe in environment  verbalize how to care for self  Progress towards outcome:  making progress    Education Record    Learner:  Patient  Barriers / Limitations:  Education level, Emotional factors and Fatigue  Method:  Brief focused, Discussion and Printed material  Outcome:  Needs reinforcement  Comments: Future appointment's through C6 scheduled. AVS printed.

## 2022-06-28 ENCOUNTER — HOSPITAL ENCOUNTER (OUTPATIENT)
Dept: NUCLEAR MEDICINE | Facility: HOSPITAL | Age: 82
Discharge: HOME | End: 2022-06-28
Attending: STUDENT IN AN ORGANIZED HEALTH CARE EDUCATION/TRAINING PROGRAM
Payer: MEDICARE

## 2022-06-28 DIAGNOSIS — C34.91 SQUAMOUS CELL CARCINOMA OF RIGHT LUNG (HCC): ICD-10-CM

## 2022-06-28 DIAGNOSIS — C34.01 MALIGNANT NEOPLASM OF RIGHT MAIN BRONCHUS (HCC): ICD-10-CM

## 2022-06-28 LAB — GLUCOSE BLDC GLUCOMTR-MCNC: 108 MG/DL (ref 70–99)

## 2022-06-28 PROCEDURE — 82962 GLUCOSE BLOOD TEST: CPT

## 2022-06-28 PROCEDURE — 78815 PET IMAGE W/CT SKULL-THIGH: CPT | Performed by: STUDENT IN AN ORGANIZED HEALTH CARE EDUCATION/TRAINING PROGRAM

## 2022-06-30 ENCOUNTER — OFFICE VISIT (OUTPATIENT)
Dept: HEMATOLOGY/ONCOLOGY | Facility: HOSPITAL | Age: 82
End: 2022-06-30
Attending: PHYSICIAN ASSISTANT
Payer: MEDICARE

## 2022-06-30 VITALS
TEMPERATURE: 98 F | SYSTOLIC BLOOD PRESSURE: 124 MMHG | WEIGHT: 193 LBS | BODY MASS INDEX: 25 KG/M2 | DIASTOLIC BLOOD PRESSURE: 59 MMHG | OXYGEN SATURATION: 94 % | HEART RATE: 76 BPM | RESPIRATION RATE: 16 BRPM

## 2022-06-30 VITALS
SYSTOLIC BLOOD PRESSURE: 124 MMHG | WEIGHT: 193 LBS | OXYGEN SATURATION: 94 % | HEIGHT: 73 IN | TEMPERATURE: 98 F | DIASTOLIC BLOOD PRESSURE: 59 MMHG | BODY MASS INDEX: 25.58 KG/M2 | HEART RATE: 76 BPM | RESPIRATION RATE: 16 BRPM

## 2022-06-30 DIAGNOSIS — C34.91 SQUAMOUS CELL CARCINOMA OF RIGHT LUNG (HCC): Primary | ICD-10-CM

## 2022-06-30 DIAGNOSIS — Z51.12 ENCOUNTER FOR ANTINEOPLASTIC IMMUNOTHERAPY: ICD-10-CM

## 2022-06-30 LAB
ALBUMIN SERPL-MCNC: 3.3 G/DL (ref 3.4–5)
ALBUMIN/GLOB SERPL: 0.8 {RATIO} (ref 1–2)
ALP LIVER SERPL-CCNC: 80 U/L
ALT SERPL-CCNC: 15 U/L
ANION GAP SERPL CALC-SCNC: 8 MMOL/L (ref 0–18)
AST SERPL-CCNC: 15 U/L (ref 15–37)
BASOPHILS # BLD AUTO: 0.07 X10(3) UL (ref 0–0.2)
BASOPHILS NFR BLD AUTO: 0.8 %
BILIRUB SERPL-MCNC: 0.7 MG/DL (ref 0.1–2)
BUN BLD-MCNC: 23 MG/DL (ref 7–18)
BUN/CREAT SERPL: 13.6 (ref 10–20)
CALCIUM BLD-MCNC: 8.8 MG/DL (ref 8.5–10.1)
CHLORIDE SERPL-SCNC: 103 MMOL/L (ref 98–112)
CO2 SERPL-SCNC: 29 MMOL/L (ref 21–32)
CREAT BLD-MCNC: 1.69 MG/DL
DEPRECATED RDW RBC AUTO: 54.4 FL (ref 35.1–46.3)
EOSINOPHIL # BLD AUTO: 0.17 X10(3) UL (ref 0–0.7)
EOSINOPHIL NFR BLD AUTO: 2.1 %
ERYTHROCYTE [DISTWIDTH] IN BLOOD BY AUTOMATED COUNT: 14.6 % (ref 11–15)
GLOBULIN PLAS-MCNC: 4.4 G/DL (ref 2.8–4.4)
GLUCOSE BLD-MCNC: 84 MG/DL (ref 70–99)
HCT VFR BLD AUTO: 32.9 %
HGB BLD-MCNC: 10 G/DL
IMM GRANULOCYTES # BLD AUTO: 0.02 X10(3) UL (ref 0–1)
IMM GRANULOCYTES NFR BLD: 0.2 %
LYMPHOCYTES # BLD AUTO: 1.92 X10(3) UL (ref 1–4)
LYMPHOCYTES NFR BLD AUTO: 23.2 %
MCH RBC QN AUTO: 30.5 PG (ref 26–34)
MCHC RBC AUTO-ENTMCNC: 30.4 G/DL (ref 31–37)
MCV RBC AUTO: 100.3 FL
MONOCYTES # BLD AUTO: 0.8 X10(3) UL (ref 0.1–1)
MONOCYTES NFR BLD AUTO: 9.7 %
NEUTROPHILS # BLD AUTO: 5.29 X10 (3) UL (ref 1.5–7.7)
NEUTROPHILS # BLD AUTO: 5.29 X10(3) UL (ref 1.5–7.7)
NEUTROPHILS NFR BLD AUTO: 64 %
OSMOLALITY SERPL CALC.SUM OF ELEC: 293 MOSM/KG (ref 275–295)
PLATELET # BLD AUTO: 224 10(3)UL (ref 150–450)
POTASSIUM SERPL-SCNC: 3.9 MMOL/L (ref 3.5–5.1)
PROT SERPL-MCNC: 7.7 G/DL (ref 6.4–8.2)
RBC # BLD AUTO: 3.28 X10(6)UL
SODIUM SERPL-SCNC: 140 MMOL/L (ref 136–145)
T4 FREE SERPL-MCNC: 1.4 NG/DL (ref 0.8–1.7)
TSI SER-ACNC: 2.13 MIU/ML (ref 0.36–3.74)
WBC # BLD AUTO: 8.3 X10(3) UL (ref 4–11)

## 2022-06-30 PROCEDURE — 96413 CHEMO IV INFUSION 1 HR: CPT

## 2022-06-30 PROCEDURE — 99215 OFFICE O/P EST HI 40 MIN: CPT | Performed by: PHYSICIAN ASSISTANT

## 2022-06-30 PROCEDURE — 84443 ASSAY THYROID STIM HORMONE: CPT

## 2022-06-30 PROCEDURE — 85025 COMPLETE CBC W/AUTO DIFF WBC: CPT

## 2022-06-30 PROCEDURE — 80053 COMPREHEN METABOLIC PANEL: CPT

## 2022-06-30 PROCEDURE — 84439 ASSAY OF FREE THYROXINE: CPT

## 2022-06-30 NOTE — PATIENT INSTRUCTIONS
1. Proceed with cycle 5 Pembrolizumab    2. Calorie options:  Boost Breeze, Ensure Clear, Premiere Clear, Boost pudding;  Super pudding:  Mix any flavor of instant pudding with 1 or 2 cups vanilla ensure (substitute for the milk)    3. Call as needed    4.   Follow-up in 3 weeks

## 2022-06-30 NOTE — PROGRESS NOTES
Pt here for C5D1 Keytruda. Arrives Via wheelchair, accompanied by Family member  sister from MD visit. Modifications in dose or schedule: No     PIV established in lab - present blood return noted. Frequency of blood return and site check throughout administration: Prior to administration, Prior to each drug and At completion of therapy   PIV removed, site covered with gauze and coban. Discharged to Home, Via wheelchair, accompanied by:Family member. AVS provided. Outpatient Oncology Care Plan  Problem list:  thrombocytopenia  fatigue  knowledge deficit  nausea and vomiting  Problems related to:    chemotherapy  Interventions:  monitor for signs/symptoms of infection  patient/family supported  encourage activity as tolerated  provided general teaching  Expected outcomes:  family support/coping well  patient supported/coping well  safe in environment  verbalize how to care for self  Progress towards outcome:  making progress    Education Record    Learner:  Patient  Barriers / Limitations:  Education level, Emotional factors and Fatigue  Method:  Brief focused, Discussion and Printed material  Outcome:  Needs reinforcement  Comments: Future appointment's through C6 scheduled. AVS printed.

## 2022-07-21 ENCOUNTER — OFFICE VISIT (OUTPATIENT)
Dept: HEMATOLOGY/ONCOLOGY | Facility: HOSPITAL | Age: 82
End: 2022-07-21
Attending: PHYSICIAN ASSISTANT
Payer: MEDICARE

## 2022-07-21 VITALS
RESPIRATION RATE: 16 BRPM | SYSTOLIC BLOOD PRESSURE: 119 MMHG | OXYGEN SATURATION: 90 % | HEART RATE: 88 BPM | TEMPERATURE: 98 F | DIASTOLIC BLOOD PRESSURE: 60 MMHG

## 2022-07-21 VITALS
TEMPERATURE: 98 F | BODY MASS INDEX: 25.58 KG/M2 | OXYGEN SATURATION: 90 % | DIASTOLIC BLOOD PRESSURE: 60 MMHG | HEART RATE: 88 BPM | WEIGHT: 193 LBS | SYSTOLIC BLOOD PRESSURE: 119 MMHG | HEIGHT: 73 IN | RESPIRATION RATE: 16 BRPM

## 2022-07-21 DIAGNOSIS — Z51.12 ENCOUNTER FOR ANTINEOPLASTIC IMMUNOTHERAPY: Primary | ICD-10-CM

## 2022-07-21 DIAGNOSIS — C34.11 MALIGNANT NEOPLASM OF UPPER LOBE OF RIGHT LUNG (HCC): Primary | ICD-10-CM

## 2022-07-21 DIAGNOSIS — C34.91 SQUAMOUS CELL CARCINOMA OF RIGHT LUNG (HCC): Primary | ICD-10-CM

## 2022-07-21 DIAGNOSIS — C34.01 MALIGNANT NEOPLASM OF RIGHT MAIN BRONCHUS (HCC): ICD-10-CM

## 2022-07-21 LAB
ALBUMIN SERPL-MCNC: 2.9 G/DL (ref 3.4–5)
ALBUMIN/GLOB SERPL: 0.6 {RATIO} (ref 1–2)
ALP LIVER SERPL-CCNC: 77 U/L
ALT SERPL-CCNC: 15 U/L
ANION GAP SERPL CALC-SCNC: 4 MMOL/L (ref 0–18)
AST SERPL-CCNC: 14 U/L (ref 15–37)
BASOPHILS # BLD AUTO: 0.06 X10(3) UL (ref 0–0.2)
BASOPHILS NFR BLD AUTO: 0.7 %
BILIRUB SERPL-MCNC: 0.5 MG/DL (ref 0.1–2)
BUN BLD-MCNC: 28 MG/DL (ref 7–18)
BUN/CREAT SERPL: 17.6 (ref 10–20)
CALCIUM BLD-MCNC: 8.9 MG/DL (ref 8.5–10.1)
CHLORIDE SERPL-SCNC: 105 MMOL/L (ref 98–112)
CO2 SERPL-SCNC: 31 MMOL/L (ref 21–32)
CREAT BLD-MCNC: 1.59 MG/DL
DEPRECATED RDW RBC AUTO: 56.8 FL (ref 35.1–46.3)
EOSINOPHIL # BLD AUTO: 0.15 X10(3) UL (ref 0–0.7)
EOSINOPHIL NFR BLD AUTO: 1.7 %
ERYTHROCYTE [DISTWIDTH] IN BLOOD BY AUTOMATED COUNT: 15.1 % (ref 11–15)
GLOBULIN PLAS-MCNC: 4.5 G/DL (ref 2.8–4.4)
GLUCOSE BLD-MCNC: 108 MG/DL (ref 70–99)
HCT VFR BLD AUTO: 32.2 %
HGB BLD-MCNC: 9.6 G/DL
IMM GRANULOCYTES # BLD AUTO: 0.03 X10(3) UL (ref 0–1)
IMM GRANULOCYTES NFR BLD: 0.3 %
LYMPHOCYTES # BLD AUTO: 1.52 X10(3) UL (ref 1–4)
LYMPHOCYTES NFR BLD AUTO: 17.5 %
MCH RBC QN AUTO: 30.5 PG (ref 26–34)
MCHC RBC AUTO-ENTMCNC: 29.8 G/DL (ref 31–37)
MCV RBC AUTO: 102.2 FL
MONOCYTES # BLD AUTO: 0.66 X10(3) UL (ref 0.1–1)
MONOCYTES NFR BLD AUTO: 7.6 %
NEUTROPHILS # BLD AUTO: 6.26 X10 (3) UL (ref 1.5–7.7)
NEUTROPHILS # BLD AUTO: 6.26 X10(3) UL (ref 1.5–7.7)
NEUTROPHILS NFR BLD AUTO: 72.2 %
OSMOLALITY SERPL CALC.SUM OF ELEC: 296 MOSM/KG (ref 275–295)
PLATELET # BLD AUTO: 245 10(3)UL (ref 150–450)
POTASSIUM SERPL-SCNC: 3.8 MMOL/L (ref 3.5–5.1)
PROT SERPL-MCNC: 7.4 G/DL (ref 6.4–8.2)
RBC # BLD AUTO: 3.15 X10(6)UL
SODIUM SERPL-SCNC: 140 MMOL/L (ref 136–145)
WBC # BLD AUTO: 8.7 X10(3) UL (ref 4–11)

## 2022-07-21 PROCEDURE — 99215 OFFICE O/P EST HI 40 MIN: CPT | Performed by: STUDENT IN AN ORGANIZED HEALTH CARE EDUCATION/TRAINING PROGRAM

## 2022-07-21 PROCEDURE — 96413 CHEMO IV INFUSION 1 HR: CPT

## 2022-07-21 PROCEDURE — 85025 COMPLETE CBC W/AUTO DIFF WBC: CPT

## 2022-07-21 PROCEDURE — 80053 COMPREHEN METABOLIC PANEL: CPT

## 2022-07-21 NOTE — PROGRESS NOTES
Pt here for Chana Conoreze. Arrives Via wheelchair, accompanied by Family member      Modifications in dose or schedule: No     PIV established in lab - present blood return noted. Frequency of blood return and site check throughout administration: Prior to administration, Prior to each drug and At completion of therapy   PIV removed, site covered with gauze and coban. Discharged to Home, Via wheelchair, accompanied by:Family member. AVS provided.     Outpatient Oncology Care Plan  Problem list:  thrombocytopenia  fatigue  knowledge deficit  nausea and vomiting  Problems related to:    chemotherapy  Interventions:  monitor for signs/symptoms of infection  patient/family supported  encourage activity as tolerated  provided general teaching  Expected outcomes:  family support/coping well  patient supported/coping well  safe in environment  verbalize how to care for self  Progress towards outcome:  making progress    Education Record    Learner:  Patient  Barriers / Limitations:  Education level, Emotional factors and Fatigue  Method:  Brief focused, Discussion and Printed material  Outcome:  Needs reinforcement  Comments:

## 2022-08-11 ENCOUNTER — TELEPHONE (OUTPATIENT)
Dept: HEMATOLOGY/ONCOLOGY | Facility: HOSPITAL | Age: 82
End: 2022-08-11

## 2022-08-11 ENCOUNTER — NURSE ONLY (OUTPATIENT)
Dept: HEMATOLOGY/ONCOLOGY | Facility: HOSPITAL | Age: 82
End: 2022-08-11
Attending: PHYSICIAN ASSISTANT
Payer: MEDICARE

## 2022-08-11 ENCOUNTER — OFFICE VISIT (OUTPATIENT)
Dept: HEMATOLOGY/ONCOLOGY | Facility: HOSPITAL | Age: 82
End: 2022-08-11
Attending: STUDENT IN AN ORGANIZED HEALTH CARE EDUCATION/TRAINING PROGRAM
Payer: MEDICARE

## 2022-08-11 VITALS
WEIGHT: 194.38 LBS | SYSTOLIC BLOOD PRESSURE: 116 MMHG | RESPIRATION RATE: 22 BRPM | TEMPERATURE: 99 F | BODY MASS INDEX: 25.76 KG/M2 | HEIGHT: 73 IN | HEART RATE: 81 BPM | DIASTOLIC BLOOD PRESSURE: 68 MMHG

## 2022-08-11 VITALS
RESPIRATION RATE: 22 BRPM | HEART RATE: 81 BPM | BODY MASS INDEX: 26 KG/M2 | TEMPERATURE: 99 F | WEIGHT: 194.38 LBS | DIASTOLIC BLOOD PRESSURE: 68 MMHG | OXYGEN SATURATION: 98 % | SYSTOLIC BLOOD PRESSURE: 116 MMHG

## 2022-08-11 DIAGNOSIS — C34.11 MALIGNANT NEOPLASM OF UPPER LOBE OF RIGHT LUNG (HCC): Primary | ICD-10-CM

## 2022-08-11 DIAGNOSIS — C34.91 SQUAMOUS CELL CARCINOMA OF RIGHT LUNG (HCC): ICD-10-CM

## 2022-08-11 DIAGNOSIS — Z51.12 ENCOUNTER FOR ANTINEOPLASTIC IMMUNOTHERAPY: ICD-10-CM

## 2022-08-11 LAB
ALBUMIN SERPL-MCNC: 3.2 G/DL (ref 3.4–5)
ALBUMIN/GLOB SERPL: 0.8 {RATIO} (ref 1–2)
ALP LIVER SERPL-CCNC: 82 U/L
ALT SERPL-CCNC: 15 U/L
ANION GAP SERPL CALC-SCNC: 6 MMOL/L (ref 0–18)
AST SERPL-CCNC: 14 U/L (ref 15–37)
BASOPHILS # BLD AUTO: 0.07 X10(3) UL (ref 0–0.2)
BASOPHILS NFR BLD AUTO: 0.9 %
BILIRUB SERPL-MCNC: 0.8 MG/DL (ref 0.1–2)
BUN BLD-MCNC: 23 MG/DL (ref 7–18)
BUN/CREAT SERPL: 14.1 (ref 10–20)
CALCIUM BLD-MCNC: 9 MG/DL (ref 8.5–10.1)
CHLORIDE SERPL-SCNC: 103 MMOL/L (ref 98–112)
CO2 SERPL-SCNC: 30 MMOL/L (ref 21–32)
CREAT BLD-MCNC: 1.63 MG/DL
DEPRECATED RDW RBC AUTO: 57.1 FL (ref 35.1–46.3)
EOSINOPHIL # BLD AUTO: 0.07 X10(3) UL (ref 0–0.7)
EOSINOPHIL NFR BLD AUTO: 0.9 %
ERYTHROCYTE [DISTWIDTH] IN BLOOD BY AUTOMATED COUNT: 14.7 % (ref 11–15)
GFR SERPLBLD BASED ON 1.73 SQ M-ARVRAT: 42 ML/MIN/1.73M2 (ref 60–?)
GLOBULIN PLAS-MCNC: 4.2 G/DL (ref 2.8–4.4)
GLUCOSE BLD-MCNC: 94 MG/DL (ref 70–99)
HCT VFR BLD AUTO: 33.3 %
HGB BLD-MCNC: 10 G/DL
IMM GRANULOCYTES # BLD AUTO: 0.02 X10(3) UL (ref 0–1)
IMM GRANULOCYTES NFR BLD: 0.2 %
LYMPHOCYTES # BLD AUTO: 1.35 X10(3) UL (ref 1–4)
LYMPHOCYTES NFR BLD AUTO: 16.8 %
MCH RBC QN AUTO: 31.2 PG (ref 26–34)
MCHC RBC AUTO-ENTMCNC: 30 G/DL (ref 31–37)
MCV RBC AUTO: 103.7 FL
MONOCYTES # BLD AUTO: 0.7 X10(3) UL (ref 0.1–1)
MONOCYTES NFR BLD AUTO: 8.7 %
NEUTROPHILS # BLD AUTO: 5.84 X10 (3) UL (ref 1.5–7.7)
NEUTROPHILS # BLD AUTO: 5.84 X10(3) UL (ref 1.5–7.7)
NEUTROPHILS NFR BLD AUTO: 72.5 %
OSMOLALITY SERPL CALC.SUM OF ELEC: 291 MOSM/KG (ref 275–295)
PLATELET # BLD AUTO: 187 10(3)UL (ref 150–450)
POTASSIUM SERPL-SCNC: 3.9 MMOL/L (ref 3.5–5.1)
PROT SERPL-MCNC: 7.4 G/DL (ref 6.4–8.2)
RBC # BLD AUTO: 3.21 X10(6)UL
SODIUM SERPL-SCNC: 139 MMOL/L (ref 136–145)
T4 FREE SERPL-MCNC: 1.1 NG/DL (ref 0.8–1.7)
TSI SER-ACNC: 1.88 MIU/ML (ref 0.36–3.74)
WBC # BLD AUTO: 8.1 X10(3) UL (ref 4–11)

## 2022-08-11 PROCEDURE — 99215 OFFICE O/P EST HI 40 MIN: CPT | Performed by: STUDENT IN AN ORGANIZED HEALTH CARE EDUCATION/TRAINING PROGRAM

## 2022-08-11 PROCEDURE — 84443 ASSAY THYROID STIM HORMONE: CPT

## 2022-08-11 PROCEDURE — 84439 ASSAY OF FREE THYROXINE: CPT

## 2022-08-11 PROCEDURE — 80053 COMPREHEN METABOLIC PANEL: CPT

## 2022-08-11 PROCEDURE — 96413 CHEMO IV INFUSION 1 HR: CPT

## 2022-08-11 PROCEDURE — 85025 COMPLETE CBC W/AUTO DIFF WBC: CPT

## 2022-08-11 NOTE — TELEPHONE ENCOUNTER
Writer called patient to inform him of new PET schedule, patient with PET scheduled for 9/20 an clinic appointments 9/22. Patient verbalized understanding.

## 2022-08-11 NOTE — PROGRESS NOTES
Pt here for [id number] [facility].. Arrives Via wheelchair, accompanied by Family member      Modifications in dose or schedule: yes, will be every 6 weeks now going forward so getting 400 mg dose today. PIV established in lab - present blood return noted. Frequency of blood return and site check throughout administration: Prior to administration, Prior to each drug and At completion of therapy     Discharged to Home, Via wheelchair, accompanied by:Family member. AVS provided.     Outpatient Oncology Care Plan  Problem list:  thrombocytopenia  fatigue  knowledge deficit  nausea and vomiting  Problems related to:    chemotherapy  Interventions:  monitor for signs/symptoms of infection  patient/family supported  encourage activity as tolerated  provided general teaching  Expected outcomes:  family support/coping well  patient supported/coping well  safe in environment  verbalize how to care for self  Progress towards outcome:  making progress    Education Record    Learner:  Patient  Barriers / Limitations:  Education level, Emotional factors and Fatigue  Method:  Brief focused, Discussion and Printed material  Outcome:  Needs reinforcement  Comments:

## 2022-09-01 ENCOUNTER — APPOINTMENT (OUTPATIENT)
Dept: HEMATOLOGY/ONCOLOGY | Facility: HOSPITAL | Age: 82
End: 2022-09-01
Attending: PHYSICIAN ASSISTANT
Payer: MEDICARE

## 2022-09-01 ENCOUNTER — APPOINTMENT (OUTPATIENT)
Dept: HEMATOLOGY/ONCOLOGY | Facility: HOSPITAL | Age: 82
End: 2022-09-01
Attending: STUDENT IN AN ORGANIZED HEALTH CARE EDUCATION/TRAINING PROGRAM
Payer: MEDICARE

## 2022-09-20 ENCOUNTER — HOSPITAL ENCOUNTER (OUTPATIENT)
Dept: NUCLEAR MEDICINE | Facility: HOSPITAL | Age: 82
Discharge: HOME | End: 2022-09-20
Attending: STUDENT IN AN ORGANIZED HEALTH CARE EDUCATION/TRAINING PROGRAM

## 2022-09-20 DIAGNOSIS — C34.11 MALIGNANT NEOPLASM OF UPPER LOBE OF RIGHT LUNG (HCC): ICD-10-CM

## 2022-09-20 DIAGNOSIS — Z51.12 ENCOUNTER FOR ANTINEOPLASTIC IMMUNOTHERAPY: ICD-10-CM

## 2022-09-20 DIAGNOSIS — C34.91 SQUAMOUS CELL CARCINOMA OF RIGHT LUNG (HCC): ICD-10-CM

## 2022-09-20 LAB — GLUCOSE BLDC GLUCOMTR-MCNC: 105 MG/DL (ref 70–99)

## 2022-09-20 PROCEDURE — 82962 GLUCOSE BLOOD TEST: CPT

## 2022-09-20 PROCEDURE — 78815 PET IMAGE W/CT SKULL-THIGH: CPT | Performed by: STUDENT IN AN ORGANIZED HEALTH CARE EDUCATION/TRAINING PROGRAM

## 2022-09-21 ENCOUNTER — APPOINTMENT (OUTPATIENT)
Dept: HEMATOLOGY/ONCOLOGY | Facility: HOSPITAL | Age: 82
End: 2022-09-21
Attending: PHYSICIAN ASSISTANT
Payer: MEDICARE

## 2022-09-22 ENCOUNTER — OFFICE VISIT (OUTPATIENT)
Dept: HEMATOLOGY/ONCOLOGY | Facility: HOSPITAL | Age: 82
End: 2022-09-22
Attending: PHYSICIAN ASSISTANT
Payer: MEDICARE

## 2022-09-22 ENCOUNTER — OFFICE VISIT (OUTPATIENT)
Dept: HEMATOLOGY/ONCOLOGY | Facility: HOSPITAL | Age: 82
End: 2022-09-22
Attending: NURSE PRACTITIONER
Payer: MEDICARE

## 2022-09-22 VITALS
TEMPERATURE: 98 F | HEIGHT: 73 IN | RESPIRATION RATE: 22 BRPM | OXYGEN SATURATION: 92 % | WEIGHT: 197 LBS | SYSTOLIC BLOOD PRESSURE: 127 MMHG | DIASTOLIC BLOOD PRESSURE: 50 MMHG | HEART RATE: 75 BPM | BODY MASS INDEX: 26.11 KG/M2

## 2022-09-22 VITALS
DIASTOLIC BLOOD PRESSURE: 50 MMHG | RESPIRATION RATE: 22 BRPM | HEIGHT: 73 IN | SYSTOLIC BLOOD PRESSURE: 127 MMHG | OXYGEN SATURATION: 92 % | BODY MASS INDEX: 26.11 KG/M2 | TEMPERATURE: 98 F | HEART RATE: 75 BPM | WEIGHT: 197 LBS

## 2022-09-22 DIAGNOSIS — C34.91 SQUAMOUS CELL CARCINOMA OF RIGHT LUNG (HCC): ICD-10-CM

## 2022-09-22 DIAGNOSIS — C34.01 MALIGNANT NEOPLASM OF RIGHT MAIN BRONCHUS (HCC): ICD-10-CM

## 2022-09-22 DIAGNOSIS — Z51.5 PALLIATIVE CARE ENCOUNTER: ICD-10-CM

## 2022-09-22 DIAGNOSIS — C34.11 MALIGNANT NEOPLASM OF UPPER LOBE OF RIGHT LUNG (HCC): Primary | ICD-10-CM

## 2022-09-22 DIAGNOSIS — C34.11 MALIGNANT NEOPLASM OF UPPER LOBE OF RIGHT LUNG (HCC): ICD-10-CM

## 2022-09-22 DIAGNOSIS — Z51.12 ENCOUNTER FOR ANTINEOPLASTIC IMMUNOTHERAPY: ICD-10-CM

## 2022-09-22 DIAGNOSIS — Z71.89 ADVANCE CARE PLANNING: ICD-10-CM

## 2022-09-22 DIAGNOSIS — Z71.89 GOALS OF CARE, COUNSELING/DISCUSSION: Primary | ICD-10-CM

## 2022-09-22 LAB
ALBUMIN SERPL-MCNC: 3.1 G/DL (ref 3.4–5)
ALBUMIN/GLOB SERPL: 0.7 {RATIO} (ref 1–2)
ALP LIVER SERPL-CCNC: 84 U/L
ALT SERPL-CCNC: 16 U/L
ANION GAP SERPL CALC-SCNC: 5 MMOL/L (ref 0–18)
AST SERPL-CCNC: 12 U/L (ref 15–37)
BASOPHILS # BLD AUTO: 0.06 X10(3) UL (ref 0–0.2)
BASOPHILS NFR BLD AUTO: 0.7 %
BILIRUB SERPL-MCNC: 0.7 MG/DL (ref 0.1–2)
BUN BLD-MCNC: 33 MG/DL (ref 7–18)
BUN/CREAT SERPL: 19.9 (ref 10–20)
CALCIUM BLD-MCNC: 8.7 MG/DL (ref 8.5–10.1)
CHLORIDE SERPL-SCNC: 104 MMOL/L (ref 98–112)
CO2 SERPL-SCNC: 31 MMOL/L (ref 21–32)
CREAT BLD-MCNC: 1.66 MG/DL
DEPRECATED RDW RBC AUTO: 53.3 FL (ref 35.1–46.3)
EOSINOPHIL # BLD AUTO: 0.16 X10(3) UL (ref 0–0.7)
EOSINOPHIL NFR BLD AUTO: 1.8 %
ERYTHROCYTE [DISTWIDTH] IN BLOOD BY AUTOMATED COUNT: 14 % (ref 11–15)
GFR SERPLBLD BASED ON 1.73 SQ M-ARVRAT: 41 ML/MIN/1.73M2 (ref 60–?)
GLOBULIN PLAS-MCNC: 4.2 G/DL (ref 2.8–4.4)
GLUCOSE BLD-MCNC: 94 MG/DL (ref 70–99)
HCT VFR BLD AUTO: 34.1 %
HGB BLD-MCNC: 10.4 G/DL
IMM GRANULOCYTES # BLD AUTO: 0.03 X10(3) UL (ref 0–1)
IMM GRANULOCYTES NFR BLD: 0.3 %
LYMPHOCYTES # BLD AUTO: 1.54 X10(3) UL (ref 1–4)
LYMPHOCYTES NFR BLD AUTO: 16.9 %
MCH RBC QN AUTO: 31.4 PG (ref 26–34)
MCHC RBC AUTO-ENTMCNC: 30.5 G/DL (ref 31–37)
MCV RBC AUTO: 103 FL
MONOCYTES # BLD AUTO: 0.67 X10(3) UL (ref 0.1–1)
MONOCYTES NFR BLD AUTO: 7.4 %
NEUTROPHILS # BLD AUTO: 6.65 X10 (3) UL (ref 1.5–7.7)
NEUTROPHILS # BLD AUTO: 6.65 X10(3) UL (ref 1.5–7.7)
NEUTROPHILS NFR BLD AUTO: 72.9 %
OSMOLALITY SERPL CALC.SUM OF ELEC: 297 MOSM/KG (ref 275–295)
PLATELET # BLD AUTO: 224 10(3)UL (ref 150–450)
POTASSIUM SERPL-SCNC: 3.9 MMOL/L (ref 3.5–5.1)
PROT SERPL-MCNC: 7.3 G/DL (ref 6.4–8.2)
RBC # BLD AUTO: 3.31 X10(6)UL
SODIUM SERPL-SCNC: 140 MMOL/L (ref 136–145)
WBC # BLD AUTO: 9.1 X10(3) UL (ref 4–11)

## 2022-09-22 PROCEDURE — 99213 OFFICE O/P EST LOW 20 MIN: CPT | Performed by: NURSE PRACTITIONER

## 2022-09-22 PROCEDURE — 85025 COMPLETE CBC W/AUTO DIFF WBC: CPT

## 2022-09-22 PROCEDURE — 96413 CHEMO IV INFUSION 1 HR: CPT

## 2022-09-22 PROCEDURE — 99215 OFFICE O/P EST HI 40 MIN: CPT | Performed by: STUDENT IN AN ORGANIZED HEALTH CARE EDUCATION/TRAINING PROGRAM

## 2022-09-22 PROCEDURE — 80053 COMPREHEN METABOLIC PANEL: CPT

## 2022-09-22 NOTE — PROGRESS NOTES
Pt here for C8D1 Keytruda. Arrives Via wheelchair, accompanied by Family member from clinic appointment prior to infusion. Modifications in dose or schedule:  No.  Frequency changed to every 6 weeks last cycle. PIV established in lab. Flushed with blood return noted. Frequency of blood return and site check throughout administration: Prior to administration, Prior to each drug and At completion of therapy     Pt tolerated treatment. No s/s adverse reactions noted. PIV flushed and removed. Site covered with gauze and coban. Discharged to Home, Via wheelchair, accompanied by:Family member. AVS provided.     Outpatient Oncology Care Plan  Problem list:  thrombocytopenia  fatigue  knowledge deficit  nausea and vomiting  Problems related to:    chemotherapy  Interventions:  monitor for signs/symptoms of infection  patient/family supported  encourage activity as tolerated  provided general teaching  Expected outcomes:  family support/coping well  patient supported/coping well  safe in environment  verbalize how to care for self  Progress towards outcome:  making progress    Education Record    Learner:  Patient  Barriers / Limitations:  Education level, Emotional factors and Fatigue  Method:  Brief focused, Discussion and Printed material  Outcome:  Needs reinforcement  Comments:

## 2022-10-06 ENCOUNTER — TELEPHONE (OUTPATIENT)
Dept: HEMATOLOGY/ONCOLOGY | Facility: HOSPITAL | Age: 82
End: 2022-10-06

## 2022-10-11 ENCOUNTER — TELEPHONE (OUTPATIENT)
Dept: HEMATOLOGY/ONCOLOGY | Facility: HOSPITAL | Age: 82
End: 2022-10-11

## 2022-10-11 NOTE — TELEPHONE ENCOUNTER
Patients sister Michael Tony calling. Was advised by Dr Corwin Patel to schedule a Radiation consult.       Please call Michael Tony to schedule

## 2022-10-11 NOTE — TELEPHONE ENCOUNTER
Unable to get [employer]. His phone was previously out but working now but not picking up. Param Mohamud, his sister, I informed her that [address] to his lung lesions is feasible and Rad Onc is agreeable to meet with the patient. He has remained undecided, concerned for side effects. I stated that it would at least be worthwhile to meet with Rad Onc to discuss further.  She will discuss this with him and if he is agreeable, she will let us know so we can place a Rad Onc referral.     Shubham Solorzano, [facility]

## 2022-10-14 DIAGNOSIS — C34.11 MALIGNANT NEOPLASM OF UPPER LOBE OF RIGHT LUNG (HCC): Primary | ICD-10-CM

## 2022-10-14 DIAGNOSIS — C34.91 SQUAMOUS CELL CARCINOMA OF RIGHT LUNG (HCC): ICD-10-CM

## 2022-10-14 DIAGNOSIS — Z51.12 ENCOUNTER FOR ANTINEOPLASTIC IMMUNOTHERAPY: ICD-10-CM

## 2022-10-20 ENCOUNTER — OFFICE VISIT (OUTPATIENT)
Dept: RADIATION ONCOLOGY | Facility: HOSPITAL | Age: 82
End: 2022-10-20
Attending: RADIOLOGY
Payer: MEDICARE

## 2022-10-20 VITALS
OXYGEN SATURATION: 92 % | RESPIRATION RATE: 20 BRPM | HEART RATE: 71 BPM | SYSTOLIC BLOOD PRESSURE: 88 MMHG | DIASTOLIC BLOOD PRESSURE: 60 MMHG | TEMPERATURE: 97 F

## 2022-11-03 ENCOUNTER — OFFICE VISIT (OUTPATIENT)
Dept: HEMATOLOGY/ONCOLOGY | Facility: HOSPITAL | Age: 82
End: 2022-11-03
Attending: STUDENT IN AN ORGANIZED HEALTH CARE EDUCATION/TRAINING PROGRAM
Payer: MEDICARE

## 2022-11-03 ENCOUNTER — NURSE ONLY (OUTPATIENT)
Dept: HEMATOLOGY/ONCOLOGY | Facility: HOSPITAL | Age: 82
End: 2022-11-03
Attending: NURSE PRACTITIONER
Payer: MEDICARE

## 2022-11-03 VITALS
WEIGHT: 196 LBS | RESPIRATION RATE: 18 BRPM | HEIGHT: 73 IN | HEART RATE: 73 BPM | BODY MASS INDEX: 25.98 KG/M2 | TEMPERATURE: 98 F | OXYGEN SATURATION: 96 % | SYSTOLIC BLOOD PRESSURE: 112 MMHG | DIASTOLIC BLOOD PRESSURE: 44 MMHG

## 2022-11-03 DIAGNOSIS — C34.11 MALIGNANT NEOPLASM OF UPPER LOBE OF RIGHT LUNG (HCC): Primary | ICD-10-CM

## 2022-11-03 DIAGNOSIS — Z29.8 ENCOUNTER FOR IMMUNOTHERAPY: ICD-10-CM

## 2022-11-03 DIAGNOSIS — C34.91 SQUAMOUS CELL CARCINOMA OF RIGHT LUNG (HCC): ICD-10-CM

## 2022-11-03 DIAGNOSIS — Z51.12 ENCOUNTER FOR ANTINEOPLASTIC IMMUNOTHERAPY: ICD-10-CM

## 2022-11-03 LAB
ALBUMIN SERPL-MCNC: 3 G/DL (ref 3.4–5)
ALBUMIN/GLOB SERPL: 0.7 {RATIO} (ref 1–2)
ALP LIVER SERPL-CCNC: 78 U/L
ALT SERPL-CCNC: 15 U/L
ANION GAP SERPL CALC-SCNC: 3 MMOL/L (ref 0–18)
AST SERPL-CCNC: 12 U/L (ref 15–37)
BASOPHILS # BLD AUTO: 0.05 X10(3) UL (ref 0–0.2)
BASOPHILS NFR BLD AUTO: 0.6 %
BILIRUB SERPL-MCNC: 0.6 MG/DL (ref 0.1–2)
BUN BLD-MCNC: 22 MG/DL (ref 7–18)
BUN/CREAT SERPL: 13.7 (ref 10–20)
CALCIUM BLD-MCNC: 8.9 MG/DL (ref 8.5–10.1)
CHLORIDE SERPL-SCNC: 104 MMOL/L (ref 98–112)
CO2 SERPL-SCNC: 31 MMOL/L (ref 21–32)
CREAT BLD-MCNC: 1.61 MG/DL
DEPRECATED RDW RBC AUTO: 51.3 FL (ref 35.1–46.3)
EOSINOPHIL # BLD AUTO: 0.16 X10(3) UL (ref 0–0.7)
EOSINOPHIL NFR BLD AUTO: 1.8 %
ERYTHROCYTE [DISTWIDTH] IN BLOOD BY AUTOMATED COUNT: 13.3 % (ref 11–15)
GFR SERPLBLD BASED ON 1.73 SQ M-ARVRAT: 43 ML/MIN/1.73M2 (ref 60–?)
GLOBULIN PLAS-MCNC: 4.2 G/DL (ref 2.8–4.4)
GLUCOSE BLD-MCNC: 103 MG/DL (ref 70–99)
HCT VFR BLD AUTO: 33.1 %
HGB BLD-MCNC: 10 G/DL
IMM GRANULOCYTES # BLD AUTO: 0.02 X10(3) UL (ref 0–1)
IMM GRANULOCYTES NFR BLD: 0.2 %
LYMPHOCYTES # BLD AUTO: 1.45 X10(3) UL (ref 1–4)
LYMPHOCYTES NFR BLD AUTO: 16.7 %
MCH RBC QN AUTO: 31.9 PG (ref 26–34)
MCHC RBC AUTO-ENTMCNC: 30.2 G/DL (ref 31–37)
MCV RBC AUTO: 105.8 FL
MONOCYTES # BLD AUTO: 0.61 X10(3) UL (ref 0.1–1)
MONOCYTES NFR BLD AUTO: 7 %
NEUTROPHILS # BLD AUTO: 6.41 X10 (3) UL (ref 1.5–7.7)
NEUTROPHILS # BLD AUTO: 6.41 X10(3) UL (ref 1.5–7.7)
NEUTROPHILS NFR BLD AUTO: 73.7 %
OSMOLALITY SERPL CALC.SUM OF ELEC: 290 MOSM/KG (ref 275–295)
PLATELET # BLD AUTO: 237 10(3)UL (ref 150–450)
POTASSIUM SERPL-SCNC: 3.9 MMOL/L (ref 3.5–5.1)
PROT SERPL-MCNC: 7.2 G/DL (ref 6.4–8.2)
RBC # BLD AUTO: 3.13 X10(6)UL
SODIUM SERPL-SCNC: 138 MMOL/L (ref 136–145)
T4 FREE SERPL-MCNC: 1.2 NG/DL (ref 0.8–1.7)
TSI SER-ACNC: 2.15 MIU/ML (ref 0.36–3.74)
WBC # BLD AUTO: 8.7 X10(3) UL (ref 4–11)

## 2022-11-03 PROCEDURE — 96413 CHEMO IV INFUSION 1 HR: CPT

## 2022-11-03 PROCEDURE — 84443 ASSAY THYROID STIM HORMONE: CPT

## 2022-11-03 PROCEDURE — 80053 COMPREHEN METABOLIC PANEL: CPT

## 2022-11-03 PROCEDURE — 84439 ASSAY OF FREE THYROXINE: CPT

## 2022-11-03 PROCEDURE — 99215 OFFICE O/P EST HI 40 MIN: CPT | Performed by: STUDENT IN AN ORGANIZED HEALTH CARE EDUCATION/TRAINING PROGRAM

## 2022-11-03 PROCEDURE — 85025 COMPLETE CBC W/AUTO DIFF WBC: CPT

## 2022-11-03 NOTE — PROGRESS NOTES
Pt here for [address]. Arrives Via wheelchair, accompanied by his sisterfrom clinic appointment prior to infusion. Modifications in dose or schedule:  No.       PIV established in lab. Flushed with blood return noted. Frequency of blood return and site check throughout administration: Prior to administration, Prior to each drug and At completion of therapy     Pt tolerated treatment. No s/s adverse reactions noted. PIV flushed and removed. Site covered with gauze and coban. Discharged to Home, Via wheelchair, accompanied by:Family member. AVS provided. Outpatient Oncology Care Plan  Problem list:  thrombocytopenia  fatigue  knowledge deficit  nausea and vomiting  Problems related to:    chemotherapy  Interventions:  monitor for signs/symptoms of infection  patient/family supported  encourage activity as tolerated  provided general teaching  Expected outcomes:  family support/coping well  patient supported/coping well  safe in environment  verbalize how to care for self  Progress towards outcome:  making progress    Education Record    Learner:  Patient  Barriers / Limitations:  Education level, Emotional factors and Fatigue  Method:  Brief focused, Discussion and Printed material  Outcome:  Needs reinforcement  Comments:He tolerated his treatment well. Printed AVS provided.

## 2022-11-21 ENCOUNTER — APPOINTMENT (OUTPATIENT)
Dept: RADIATION ONCOLOGY | Facility: HOSPITAL | Age: 82
End: 2022-11-21
Attending: RADIOLOGY
Payer: MEDICARE

## 2022-11-23 ENCOUNTER — APPOINTMENT (OUTPATIENT)
Dept: RADIATION ONCOLOGY | Facility: HOSPITAL | Age: 82
End: 2022-11-23
Attending: RADIOLOGY
Payer: MEDICARE

## 2022-12-01 ENCOUNTER — HOSPITAL ENCOUNTER (OUTPATIENT)
Dept: RADIATION ONCOLOGY | Facility: HOSPITAL | Age: 82
Discharge: HOME | End: 2022-12-01
Attending: RADIOLOGY
Payer: MEDICARE

## 2022-12-02 ENCOUNTER — APPOINTMENT (OUTPATIENT)
Dept: RADIATION ONCOLOGY | Facility: HOSPITAL | Age: 82
End: 2022-12-02
Attending: RADIOLOGY
Payer: MEDICARE

## 2022-12-02 PROCEDURE — 77470 SPECIAL RADIATION TREATMENT: CPT | Performed by: RADIOLOGY

## 2022-12-02 PROCEDURE — 77334 RADIATION TREATMENT AID(S): CPT | Performed by: RADIOLOGY

## 2022-12-02 PROCEDURE — 77399 UNLISTED PX MED RADJ PHYSICS: CPT | Performed by: RADIOLOGY

## 2022-12-05 PROCEDURE — 77293 RESPIRATOR MOTION MGMT SIMUL: CPT | Performed by: RADIOLOGY

## 2022-12-05 PROCEDURE — 77300 RADIATION THERAPY DOSE PLAN: CPT | Performed by: RADIOLOGY

## 2022-12-05 PROCEDURE — 77338 DESIGN MLC DEVICE FOR IMRT: CPT | Performed by: RADIOLOGY

## 2022-12-05 PROCEDURE — 77301 RADIOTHERAPY DOSE PLAN IMRT: CPT | Performed by: RADIOLOGY

## 2022-12-13 ENCOUNTER — HOSPITAL ENCOUNTER (INPATIENT)
Facility: HOSPITAL | Age: 82
LOS: 10 days | Discharge: NURSING, REHAB OR LONG-TERM CARE | End: 2022-12-23
Attending: EMERGENCY MEDICINE | Admitting: HOSPITALIST
Payer: MEDICARE

## 2022-12-13 ENCOUNTER — APPOINTMENT (OUTPATIENT)
Dept: GENERAL RADIOLOGY | Facility: HOSPITAL | Age: 82
End: 2022-12-13
Attending: EMERGENCY MEDICINE
Payer: MEDICARE

## 2022-12-13 DIAGNOSIS — J06.9 VIRAL UPPER RESPIRATORY TRACT INFECTION: ICD-10-CM

## 2022-12-13 DIAGNOSIS — R53.1 WEAKNESS GENERALIZED: Primary | ICD-10-CM

## 2022-12-13 LAB
ALBUMIN SERPL-MCNC: 2.8 G/DL (ref 3.4–5)
ALBUMIN/GLOB SERPL: 0.6 {RATIO} (ref 1–2)
ALP LIVER SERPL-CCNC: 63 U/L
ALT SERPL-CCNC: 17 U/L
ANION GAP SERPL CALC-SCNC: 2 MMOL/L (ref 0–18)
ANION GAP SERPL CALC-SCNC: 2 MMOL/L (ref 0–18)
AST SERPL-CCNC: 19 U/L (ref 15–37)
BASOPHILS # BLD AUTO: 0.04 X10(3) UL (ref 0–0.2)
BASOPHILS NFR BLD AUTO: 0.5 %
BILIRUB SERPL-MCNC: 0.6 MG/DL (ref 0.1–2)
BUN BLD-MCNC: 25 MG/DL (ref 7–18)
BUN BLD-MCNC: 26 MG/DL (ref 7–18)
BUN/CREAT SERPL: 14.3 (ref 10–20)
BUN/CREAT SERPL: 14.9 (ref 10–20)
CALCIUM BLD-MCNC: 8.6 MG/DL (ref 8.5–10.1)
CALCIUM BLD-MCNC: 8.8 MG/DL (ref 8.5–10.1)
CHLORIDE SERPL-SCNC: 101 MMOL/L (ref 98–112)
CHLORIDE SERPL-SCNC: 102 MMOL/L (ref 98–112)
CO2 SERPL-SCNC: 36 MMOL/L (ref 21–32)
CO2 SERPL-SCNC: 37 MMOL/L (ref 21–32)
CREAT BLD-MCNC: 1.74 MG/DL
CREAT BLD-MCNC: 1.75 MG/DL
DEPRECATED RDW RBC AUTO: 48.1 FL (ref 35.1–46.3)
EOSINOPHIL # BLD AUTO: 0.14 X10(3) UL (ref 0–0.7)
EOSINOPHIL NFR BLD AUTO: 1.8 %
ERYTHROCYTE [DISTWIDTH] IN BLOOD BY AUTOMATED COUNT: 12.9 % (ref 11–15)
FLUAV + FLUBV RNA SPEC NAA+PROBE: NEGATIVE
FLUAV + FLUBV RNA SPEC NAA+PROBE: NEGATIVE
GFR SERPLBLD BASED ON 1.73 SQ M-ARVRAT: 39 ML/MIN/1.73M2 (ref 60–?)
GFR SERPLBLD BASED ON 1.73 SQ M-ARVRAT: 39 ML/MIN/1.73M2 (ref 60–?)
GLOBULIN PLAS-MCNC: 4.5 G/DL (ref 2.8–4.4)
GLUCOSE BLD-MCNC: 94 MG/DL (ref 70–99)
GLUCOSE BLD-MCNC: 97 MG/DL (ref 70–99)
HCT VFR BLD AUTO: 32.1 %
HGB BLD-MCNC: 10.2 G/DL
IMM GRANULOCYTES # BLD AUTO: 0.02 X10(3) UL (ref 0–1)
IMM GRANULOCYTES NFR BLD: 0.3 %
LYMPHOCYTES # BLD AUTO: 1.4 X10(3) UL (ref 1–4)
LYMPHOCYTES NFR BLD AUTO: 18.1 %
MCH RBC QN AUTO: 32.4 PG (ref 26–34)
MCHC RBC AUTO-ENTMCNC: 31.8 G/DL (ref 31–37)
MCV RBC AUTO: 101.9 FL
MONOCYTES # BLD AUTO: 0.9 X10(3) UL (ref 0.1–1)
MONOCYTES NFR BLD AUTO: 11.6 %
NEUTROPHILS # BLD AUTO: 5.24 X10 (3) UL (ref 1.5–7.7)
NEUTROPHILS # BLD AUTO: 5.24 X10(3) UL (ref 1.5–7.7)
NEUTROPHILS NFR BLD AUTO: 67.7 %
OSMOLALITY SERPL CALC.SUM OF ELEC: 294 MOSM/KG (ref 275–295)
OSMOLALITY SERPL CALC.SUM OF ELEC: 295 MOSM/KG (ref 275–295)
PLATELET # BLD AUTO: 210 10(3)UL (ref 150–450)
POTASSIUM SERPL-SCNC: 4.3 MMOL/L (ref 3.5–5.1)
POTASSIUM SERPL-SCNC: 4.4 MMOL/L (ref 3.5–5.1)
PROT SERPL-MCNC: 7.3 G/DL (ref 6.4–8.2)
RBC # BLD AUTO: 3.15 X10(6)UL
RSV RNA SPEC NAA+PROBE: NEGATIVE
SARS-COV-2 RNA RESP QL NAA+PROBE: DETECTED
SARS-COV-2 RNA RESP QL NAA+PROBE: DETECTED
SODIUM SERPL-SCNC: 140 MMOL/L (ref 136–145)
SODIUM SERPL-SCNC: 140 MMOL/L (ref 136–145)
TROPONIN I HIGH SENSITIVITY: 20 NG/L
WBC # BLD AUTO: 7.7 X10(3) UL (ref 4–11)

## 2022-12-13 PROCEDURE — 99223 1ST HOSP IP/OBS HIGH 75: CPT | Performed by: INTERNAL MEDICINE

## 2022-12-13 PROCEDURE — 99223 1ST HOSP IP/OBS HIGH 75: CPT | Performed by: HOSPITALIST

## 2022-12-13 PROCEDURE — 71045 X-RAY EXAM CHEST 1 VIEW: CPT | Performed by: EMERGENCY MEDICINE

## 2022-12-13 RX ORDER — TEMAZEPAM 15 MG/1
15 CAPSULE ORAL NIGHTLY PRN
Status: DISCONTINUED | OUTPATIENT
Start: 2022-12-13 | End: 2022-12-23

## 2022-12-13 RX ORDER — ACETAMINOPHEN 500 MG
500 TABLET ORAL EVERY 4 HOURS PRN
Status: DISCONTINUED | OUTPATIENT
Start: 2022-12-13 | End: 2022-12-23

## 2022-12-13 RX ORDER — IPRATROPIUM BROMIDE AND ALBUTEROL SULFATE 2.5; .5 MG/3ML; MG/3ML
3 SOLUTION RESPIRATORY (INHALATION) EVERY 6 HOURS PRN
Status: DISCONTINUED | OUTPATIENT
Start: 2022-12-13 | End: 2022-12-13

## 2022-12-13 RX ORDER — ATORVASTATIN CALCIUM 40 MG/1
40 TABLET, FILM COATED ORAL DAILY
Status: DISCONTINUED | OUTPATIENT
Start: 2022-12-13 | End: 2022-12-23

## 2022-12-13 RX ORDER — METHYLPREDNISOLONE SODIUM SUCCINATE 40 MG/ML
40 INJECTION, POWDER, LYOPHILIZED, FOR SOLUTION INTRAMUSCULAR; INTRAVENOUS EVERY 6 HOURS
Status: DISCONTINUED | OUTPATIENT
Start: 2022-12-13 | End: 2022-12-14

## 2022-12-13 RX ORDER — HEPARIN SODIUM 5000 [USP'U]/ML
5000 INJECTION, SOLUTION INTRAVENOUS; SUBCUTANEOUS EVERY 12 HOURS SCHEDULED
Status: DISCONTINUED | OUTPATIENT
Start: 2022-12-13 | End: 2022-12-17

## 2022-12-13 RX ORDER — PANTOPRAZOLE SODIUM 40 MG/1
40 TABLET, DELAYED RELEASE ORAL
Status: DISCONTINUED | OUTPATIENT
Start: 2022-12-14 | End: 2022-12-23

## 2022-12-13 RX ORDER — METHYLPREDNISOLONE SODIUM SUCCINATE 125 MG/2ML
125 INJECTION, POWDER, LYOPHILIZED, FOR SOLUTION INTRAMUSCULAR; INTRAVENOUS ONCE
Status: COMPLETED | OUTPATIENT
Start: 2022-12-13 | End: 2022-12-13

## 2022-12-13 RX ORDER — BENZONATATE 100 MG/1
200 CAPSULE ORAL 3 TIMES DAILY PRN
Status: DISCONTINUED | OUTPATIENT
Start: 2022-12-13 | End: 2022-12-23

## 2022-12-13 RX ORDER — IPRATROPIUM BROMIDE AND ALBUTEROL SULFATE 2.5; .5 MG/3ML; MG/3ML
3 SOLUTION RESPIRATORY (INHALATION) ONCE
Status: COMPLETED | OUTPATIENT
Start: 2022-12-13 | End: 2022-12-13

## 2022-12-13 RX ORDER — METOCLOPRAMIDE HYDROCHLORIDE 5 MG/ML
5 INJECTION INTRAMUSCULAR; INTRAVENOUS EVERY 8 HOURS PRN
Status: DISCONTINUED | OUTPATIENT
Start: 2022-12-13 | End: 2022-12-23

## 2022-12-13 RX ORDER — LOSARTAN POTASSIUM 50 MG/1
50 TABLET ORAL DAILY
Status: DISCONTINUED | OUTPATIENT
Start: 2022-12-13 | End: 2022-12-15

## 2022-12-13 RX ORDER — ALBUTEROL SULFATE 90 UG/1
4 AEROSOL, METERED RESPIRATORY (INHALATION)
Status: DISCONTINUED | OUTPATIENT
Start: 2022-12-13 | End: 2022-12-23

## 2022-12-13 RX ORDER — ONDANSETRON 2 MG/ML
4 INJECTION INTRAMUSCULAR; INTRAVENOUS EVERY 6 HOURS PRN
Status: DISCONTINUED | OUTPATIENT
Start: 2022-12-13 | End: 2022-12-23

## 2022-12-13 RX ORDER — IPRATROPIUM BROMIDE AND ALBUTEROL SULFATE 2.5; .5 MG/3ML; MG/3ML
3 SOLUTION RESPIRATORY (INHALATION) EVERY 6 HOURS
Status: DISCONTINUED | OUTPATIENT
Start: 2022-12-13 | End: 2022-12-13

## 2022-12-13 NOTE — ED QUICK NOTES
Orders for admission, patient is aware of plan and ready to go upstairs. Any questions, please call ED RN Wilda Schwab at extension [phone].      Patient Covid vaccination status: Fully vaccinated     COVID Test Ordered in ED: SARS-CoV-2/Flu A and B/RSV by PCR (GeneXpert)-pending    COVID Suspicion at Admission: Low clinical suspicion for COVID    Running Infusions:  None    Mental Status/LOC at time of transport: Alert and oriented x 4    Other pertinent information: Fall risk/generalized weakness  CIWA score: N/A   NIH score:  N/A

## 2022-12-13 NOTE — ED INITIAL ASSESSMENT (HPI)
Pt to ED via EMS with c/o generalized fatigue and dyspnea for about one week. Pt states intermittent chest pain. Pt wears home oxygen at all times at 2L nasal cannula. No respiratory distress noted. Pt states taking home medications as directed. Pt is alert and oriented x4. Pt skin warm and dry.

## 2022-12-13 NOTE — H&P
[facility]    PATIENT'S NAME: Esdras Harrington   ATTENDING PHYSICIAN: eBn Nance. Pina Gross MD   PATIENT ACCOUNT#:   [id number]    LOCATION:  [address]  MEDICAL RECORD #:   [id number]       YOB: 1940  ADMISSION DATE:       12/13/2022    HISTORY AND PHYSICAL EXAMINATION    CHIEF COMPLAINT:  COPD exacerbation. 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 CBC showed white blood cell count of 7.7, hemoglobin 10.2; differential is normal.  Chemistry showed bicarb 37, BUN and creatinine of 26 and 1.74. GFR is 39, at baseline. Troponin was negative. Viral panel GeneXpert still pending. The patient was started on IV Solu-Medrol and nebulizer treatments. Chest x-ray showed hyperinflation without evidence of acute pneumonia; right perihilar mass also noted with known underlying cancer. PAST MEDICAL HISTORY:  Right upper lobe mass found on imaging studies with metastasis to the left 6th rib, possible another metastatic disease in the left upper lobe versus another lung primary. The patient was started on immunotherapy with Keytruda with response to treatment based on imaging studies. He had severe underlying chronic obstructive pulmonary disease. Cancer is non-small-cell lung cancer squamous cell carcinoma. Chronic obstructive pulmonary disease. Coronary artery disease, single vessel PCI, ischemic cardiomyopathy with AICD in place, normalization of ejection fraction. Chronic renal insufficiency stage 3, hypertension, hyperlipidemia, generalized osteoarthritis, anemia of chronic kidney disease. PAST SURGICAL HISTORY:  Remote appendectomy.     MEDICATIONS:  Please see medication reconciliation list.    ALLERGIES:  No known drug allergies. FAMILY HISTORY:  Father had cerebrovascular accident and hypertension. Mother had heart failure. SOCIAL HISTORY:  Extensive tobacco use history. No current tobacco, alcohol, or drug use. Lives with his family. Uses a walker to ambulate around. REVIEW OF SYSTEMS:  The patient reports progressive wheezing and shortness of breath with difficulty with ambulation for the last 2 days. Symptoms have been progressive in nature. The patient used his nebulizer machine at home without significant improvement. PHYSICAL EXAMINATION:    GENERAL:  Alert and oriented to time, place, and person, moderate distress, visibly dyspneic. VITAL SIGNS:  Temperature 97.7, pulse 72, respiratory rate 20, blood pressure 148/71, pulse ox 98% on 2L nasal cannula oxygen. HEENT:  Atraumatic. Oropharynx clear. Moist mucous membranes. Ears, nose normal.  Eyes:  Anicteric sclerae. NECK:  Supple. No lymphadenopathy. Trachea midline. Full range of motion. LUNGS:  Bilateral expiratory wheezes. Moderate air restriction. Increased respiratory effort. HEART:  Regular rate and rhythm. S1 and S2 auscultated. Monitor showed paced rhythm. ABDOMEN:  Soft, nondistended. No tenderness. Positive bowel sounds. EXTREMITIES:  No peripheral edema, clubbing, or cyanosis. NEUROLOGIC:  Motor and sensory intact. ASSESSMENT AND PLAN:    1. Chronic obstructive pulmonary disease with acute exacerbation. 2.   Metastatic non-small-cell lung cancer. 3.   Hypertension. 4.   Chronic kidney disease stage 3. The patient will be admitted to general medical floor. IV Solu-Medrol. Nebulizer treatments. DVT prophylaxis. Monitor his clinical status and respiratory status. Obtain Pulmonary consult. Will also notify his oncologist, Dr. Kenny Flores. Obtain physical therapy. Further recommendations to follow.     Dictated By Fabricio Fitzpatrick MD  d: 12/13/2022 14:27:21  t: 12/13/2022 14:28:46  Job [id number]  ND/

## 2022-12-14 LAB
ANION GAP SERPL CALC-SCNC: 4 MMOL/L (ref 0–18)
ATRIAL RATE: 71 BPM
BASOPHILS # BLD AUTO: 0.01 X10(3) UL (ref 0–0.2)
BASOPHILS NFR BLD AUTO: 0.2 %
BUN BLD-MCNC: 29 MG/DL (ref 7–18)
BUN/CREAT SERPL: 18.1 (ref 10–20)
CALCIUM BLD-MCNC: 8.9 MG/DL (ref 8.5–10.1)
CHLORIDE SERPL-SCNC: 101 MMOL/L (ref 98–112)
CO2 SERPL-SCNC: 35 MMOL/L (ref 21–32)
CREAT BLD-MCNC: 1.6 MG/DL
DEPRECATED RDW RBC AUTO: 48.2 FL (ref 35.1–46.3)
EOSINOPHIL # BLD AUTO: 0 X10(3) UL (ref 0–0.7)
EOSINOPHIL NFR BLD AUTO: 0 %
ERYTHROCYTE [DISTWIDTH] IN BLOOD BY AUTOMATED COUNT: 12.7 % (ref 11–15)
GFR SERPLBLD BASED ON 1.73 SQ M-ARVRAT: 43 ML/MIN/1.73M2 (ref 60–?)
GLUCOSE BLD-MCNC: 144 MG/DL (ref 70–99)
HCT VFR BLD AUTO: 33 %
HGB BLD-MCNC: 10.1 G/DL
IMM GRANULOCYTES # BLD AUTO: 0.06 X10(3) UL (ref 0–1)
IMM GRANULOCYTES NFR BLD: 1 %
LYMPHOCYTES # BLD AUTO: 0.54 X10(3) UL (ref 1–4)
LYMPHOCYTES NFR BLD AUTO: 9 %
MCH RBC QN AUTO: 31.2 PG (ref 26–34)
MCHC RBC AUTO-ENTMCNC: 30.6 G/DL (ref 31–37)
MCV RBC AUTO: 101.9 FL
MONOCYTES # BLD AUTO: 0.08 X10(3) UL (ref 0.1–1)
MONOCYTES NFR BLD AUTO: 1.3 %
NEUTROPHILS # BLD AUTO: 5.3 X10 (3) UL (ref 1.5–7.7)
NEUTROPHILS # BLD AUTO: 5.3 X10(3) UL (ref 1.5–7.7)
NEUTROPHILS NFR BLD AUTO: 88.5 %
OSMOLALITY SERPL CALC.SUM OF ELEC: 298 MOSM/KG (ref 275–295)
P AXIS: -10 DEGREES
P-R INTERVAL: 222 MS
PLATELET # BLD AUTO: 233 10(3)UL (ref 150–450)
POTASSIUM SERPL-SCNC: 4.6 MMOL/L (ref 3.5–5.1)
Q-T INTERVAL: 394 MS
QRS DURATION: 94 MS
QTC CALCULATION (BEZET): 428 MS
R AXIS: -59 DEGREES
RBC # BLD AUTO: 3.24 X10(6)UL
SODIUM SERPL-SCNC: 140 MMOL/L (ref 136–145)
T AXIS: 51 DEGREES
VENTRICULAR RATE: 71 BPM
WBC # BLD AUTO: 6 X10(3) UL (ref 4–11)

## 2022-12-14 PROCEDURE — 99232 SBSQ HOSP IP/OBS MODERATE 35: CPT | Performed by: PHYSICIAN ASSISTANT

## 2022-12-14 PROCEDURE — 99233 SBSQ HOSP IP/OBS HIGH 50: CPT | Performed by: HOSPITALIST

## 2022-12-14 PROCEDURE — 99223 1ST HOSP IP/OBS HIGH 75: CPT | Performed by: STUDENT IN AN ORGANIZED HEALTH CARE EDUCATION/TRAINING PROGRAM

## 2022-12-14 PROCEDURE — XW033E5 INTRODUCTION OF REMDESIVIR ANTI-INFECTIVE INTO PERIPHERAL VEIN, PERCUTANEOUS APPROACH, NEW TECHNOLOGY GROUP 5: ICD-10-PCS | Performed by: HOSPITALIST

## 2022-12-14 RX ORDER — METHYLPREDNISOLONE SODIUM SUCCINATE 40 MG/ML
40 INJECTION, POWDER, LYOPHILIZED, FOR SOLUTION INTRAMUSCULAR; INTRAVENOUS EVERY 8 HOURS
Status: DISCONTINUED | OUTPATIENT
Start: 2022-12-14 | End: 2022-12-16

## 2022-12-14 NOTE — PLAN OF CARE
Pt sleeping most of the night. Continuous pulse ox started. All safety measures in place. Pt verbalizes that he's very sleepy, but will call for help getting up. Bed alarm on. Problem: Patient Centered Care  Goal: Patient preferences are identified and integrated in the patient's plan of care  Description: Interventions:  - What would you like us to know as we care for you? I live at home alone  - Provide timely, complete, and accurate information to patient/family  - Incorporate patient and family knowledge, values, beliefs, and cultural backgrounds into the planning and delivery of care  - Encourage patient/family to participate in care and decision-making at the level they choose  - Honor patient and family perspectives and choices  Outcome: Progressing     Problem: Patient/Family Goals  Goal: Patient/Family Long Term Goal  Description: Patient's Long Term Goal: build up my strength    Interventions:  - PT/OT  -Covid meds  -increase ambulation in room  - See additional Care Plan goals for specific interventions  Outcome: Progressing  Goal: Patient/Family Short Term Goal  Description: Patient's Short Term Goal: feel rested    Interventions:   - minimize night time interuptions  -IV meds  -encourage ambulation with staff during day   - See additional Care Plan goals for specific interventions  Outcome: Progressing     Problem: SAFETY ADULT - FALL  Goal: Free from fall injury  Description: INTERVENTIONS:  - Assess pt frequently for physical needs  - Identify cognitive and physical deficits and behaviors that affect risk of falls.   - [place] fall precautions as indicated by assessment.  - Educate pt/family on patient safety including physical limitations  - Instruct pt to call for assistance with activity based on assessment  - Modify environment to reduce risk of injury  - Provide assistive devices as appropriate  - Consider OT/PT consult to assist with strengthening/mobility  - Encourage toileting schedule  Outcome: Progressing     Problem: DISCHARGE PLANNING  Goal: Discharge to home or other facility with appropriate resources  Description: INTERVENTIONS:  - Identify barriers to discharge w/pt and caregiver  - Include patient/family/discharge partner in discharge planning  - Arrange for needed discharge resources and transportation as appropriate  - Identify discharge learning needs (meds, wound care, etc)  - Arrange for interpreters to assist at discharge as needed  - Consider post-discharge preferences of patient/family/discharge partner  - Complete POLST form as appropriate  - Assess patient's ability to be responsible for managing their own health  - Refer to Case Management Department for coordinating discharge planning if the patient needs post-hospital services based on physician/LIP order or complex needs related to functional status, cognitive ability or social support system  Outcome: Progressing     Problem: RESPIRATORY - ADULT  Goal: Achieves optimal ventilation and oxygenation  Description: INTERVENTIONS:  - Assess for changes in respiratory status  - Assess for changes in mentation and behavior  - Position to facilitate oxygenation and minimize respiratory effort  - Oxygen supplementation based on oxygen saturation or ABGs  - Provide Smoking Cessation handout, if applicable  - Encourage broncho-pulmonary hygiene including cough, deep breathe, Incentive Spirometry  - Assess the need for suctioning and perform as needed  - Assess and instruct to report SOB or any respiratory difficulty  - Respiratory Therapy support as indicated  - Manage/alleviate anxiety  - Monitor for signs/symptoms of CO2 retention  Outcome: Progressing

## 2022-12-14 NOTE — CDS QUERY
How to answer this Query:  1.) Click \"Edit button\" on the toolbar.  2.) Type an \"X\" in the bracket for the diagnosis that applies. (You may also add additional clinical details as you feel necessary to substantiate your response). 3.) Finally click \"Sign\" to complete response. Thank You  Heart Failure  CLINICAL DOCUMENTATION CLARIFICATION FORM  Dear Doctor:SHELIA  Clinical information (provided below) includes a diagnosis of Heart Failure. For accurate ICD-10-CM code assignment to reflect severity of illness and risk of mortality,  PLEASE (X) ALL DIAGNOSES THAT APPLY.    (  x  ) Chronic Systolic Heart Failure  (    ) Chronic Diastolic Heart Failure    (    ) Chronic combined systolic and diastolic Heart Failure   (    ) Other - please specify:   (    ) Unable to determine - please comment:         Documentation from the Medical Record: 12/13 DR PLASENCIA-  Past Medical History:   Diagnosis Date    Congestive heart disease ([facility] [address] [facility]      COPD (chronic obstructive pulmonary disease) ([facility] [address]      Impression:  3- CHF      CXR=1. No well-defined acute pneumonia. Advanced hyperinflation and COPD changes. NO BNP, NO DIURETIC IV     If you have any questions, please contact Clinical :  Tom Leon RN at [phone]     Thank You!      THIS FORM IS A PERMANENT PART OF THE MEDICAL RECORD

## 2022-12-14 NOTE — CM/SW NOTE
12/14/22 1600   CM/SW Referral Data   Referral Source Physician   Reason for Referral Discharge planning   Informant Patient   Pertinent Medical Hx   Does patient have an established PCP? Yes  Derick Michele   Patient Info   Advanced directives? Yes   Patient's Current Mental Status at Time of Assessment Alert;Oriented   Patient's [address]   Number of Levels in Home 1   Patient lives with Alone   Patient Status Prior to Admission   Independent with ADLs and Mobility Yes   Services in place prior to admission DME/Supplies at home   DME Provider/Supplier [employer]   Type of DME/Supplies Standard Walker;Straight Cane;Home Oxygen   Discharge Needs   Anticipated D/C needs Home health care;Subacute rehab     SW received MDO for discharge planning. SW called pt's room phone and introduced self and role to pt. Pt is alert and oriented at time of assessment. Pt provided above information. Pt reports that his sister does his shopping for him. He has a walker, cane, and o2 from [address] at home. He uses 2L of 02 at night. Pt was informed that PT is rec [facility]. Pt is thinking about [facility],but he is willing to see what facilities are able to accept him. Pt is open to [facility]. F2f completed and uploaded via [employer]. Need  [facility]- *list/choice/passr  HH- f2f completed, *list/choice    Plan: TBD    SW/CM to remain available for support and/or discharge planning.      Elson Call, MSW, LSW   x [phone]

## 2022-12-15 ENCOUNTER — APPOINTMENT (OUTPATIENT)
Dept: HEMATOLOGY/ONCOLOGY | Facility: HOSPITAL | Age: 82
End: 2022-12-15
Attending: STUDENT IN AN ORGANIZED HEALTH CARE EDUCATION/TRAINING PROGRAM
Payer: MEDICARE

## 2022-12-15 LAB
ALBUMIN SERPL-MCNC: 2.7 G/DL (ref 3.4–5)
ALBUMIN/GLOB SERPL: 0.7 {RATIO} (ref 1–2)
ALP LIVER SERPL-CCNC: 59 U/L
ALT SERPL-CCNC: 18 U/L
ANION GAP SERPL CALC-SCNC: 4 MMOL/L (ref 0–18)
AST SERPL-CCNC: 15 U/L (ref 15–37)
BILIRUB SERPL-MCNC: 0.4 MG/DL (ref 0.1–2)
BUN BLD-MCNC: 51 MG/DL (ref 7–18)
BUN/CREAT SERPL: 25.5 (ref 10–20)
CALCIUM BLD-MCNC: 8.4 MG/DL (ref 8.5–10.1)
CHLORIDE SERPL-SCNC: 101 MMOL/L (ref 98–112)
CO2 SERPL-SCNC: 34 MMOL/L (ref 21–32)
CREAT BLD-MCNC: 2 MG/DL
GFR SERPLBLD BASED ON 1.73 SQ M-ARVRAT: 33 ML/MIN/1.73M2 (ref 60–?)
GLOBULIN PLAS-MCNC: 3.8 G/DL (ref 2.8–4.4)
GLUCOSE BLD-MCNC: 118 MG/DL (ref 70–99)
OSMOLALITY SERPL CALC.SUM OF ELEC: 303 MOSM/KG (ref 275–295)
POTASSIUM SERPL-SCNC: 4.7 MMOL/L (ref 3.5–5.1)
PROT SERPL-MCNC: 6.5 G/DL (ref 6.4–8.2)
SODIUM SERPL-SCNC: 139 MMOL/L (ref 136–145)

## 2022-12-15 PROCEDURE — 99233 SBSQ HOSP IP/OBS HIGH 50: CPT | Performed by: HOSPITALIST

## 2022-12-15 PROCEDURE — 99233 SBSQ HOSP IP/OBS HIGH 50: CPT | Performed by: INTERNAL MEDICINE

## 2022-12-15 RX ORDER — SODIUM CHLORIDE 9 MG/ML
INJECTION, SOLUTION INTRAVENOUS CONTINUOUS
Status: DISCONTINUED | OUTPATIENT
Start: 2022-12-15 | End: 2022-12-18

## 2022-12-15 NOTE — HOME CARE LIAISON
Discussed with patient list of [facility] providers from [address], patient choice is [address]. Agency reserved [address] and contact information placed on AVS. Financial interest disclosure provided to patient. Heidy Ndiaye updated.

## 2022-12-15 NOTE — PLAN OF CARE
Problem: Patient Centered Care  Goal: Patient preferences are identified and integrated in the patient's plan of care  Description: Interventions:  - What would you like us to know as we care for you? I live at home alone  - Provide timely, complete, and accurate information to patient/family  - Incorporate patient and family knowledge, values, beliefs, and cultural backgrounds into the planning and delivery of care  - Encourage patient/family to participate in care and decision-making at the level they choose  - Honor patient and family perspectives and choices  Outcome: Progressing     Problem: Patient/Family Goals  Goal: Patient/Family Long Term Goal  Description: Patient's Long Term Goal: build up my strength    Interventions:  - PT/OT  -Covid meds  -increase ambulation in room  - See additional Care Plan goals for specific interventions  Outcome: Progressing  Goal: Patient/Family Short Term Goal  Description: Patient's Short Term Goal: feel rested    Interventions:   - minimize night time interuptions  -IV meds  -encourage ambulation with staff during day   - See additional Care Plan goals for specific interventions  Outcome: Progressing     Problem: SAFETY ADULT - FALL  Goal: Free from fall injury  Description: INTERVENTIONS:  - Assess pt frequently for physical needs  - Identify cognitive and physical deficits and behaviors that affect risk of falls.   - [place] fall precautions as indicated by assessment.  - Educate pt/family on patient safety including physical limitations  - Instruct pt to call for assistance with activity based on assessment  - Modify environment to reduce risk of injury  - Provide assistive devices as appropriate  - Consider OT/PT consult to assist with strengthening/mobility  - Encourage toileting schedule  Outcome: Progressing     Problem: DISCHARGE PLANNING  Goal: Discharge to home or other facility with appropriate resources  Description: INTERVENTIONS:  - Identify barriers to discharge w/pt and caregiver  - Include patient/family/discharge partner in discharge planning  - Arrange for needed discharge resources and transportation as appropriate  - Identify discharge learning needs (meds, wound care, etc)  - Arrange for interpreters to assist at discharge as needed  - Consider post-discharge preferences of patient/family/discharge partner  - Complete POLST form as appropriate  - Assess patient's ability to be responsible for managing their own health  - Refer to Case Management Department for coordinating discharge planning if the patient needs post-hospital services based on physician/LIP order or complex needs related to functional status, cognitive ability or social support system  Outcome: Progressing     Problem: RESPIRATORY - ADULT  Goal: Achieves optimal ventilation and oxygenation  Description: INTERVENTIONS:  - Assess for changes in respiratory status  - Assess for changes in mentation and behavior  - Position to facilitate oxygenation and minimize respiratory effort  - Oxygen supplementation based on oxygen saturation or ABGs  - Provide Smoking Cessation handout, if applicable  - Encourage broncho-pulmonary hygiene including cough, deep breathe, Incentive Spirometry  - Assess the need for suctioning and perform as needed  - Assess and instruct to report SOB or any respiratory difficulty  - Respiratory Therapy support as indicated  - Manage/alleviate anxiety  - Monitor for signs/symptoms of CO2 retention  Outcome: Progressing   Patient in no acute distress, denies pain, remains 2L o2, precautions maintained, plan of care reviewed

## 2022-12-15 NOTE — PLAN OF CARE
Patient is alert and oriented x4. On 2L NC, denies SOB, vitals stable. Educated patient on plan of care, and general education. Call light within reach. Bed in lowest position. Fall precautions in place. All needs addressed. Hourly rounding maintained. Report given to night shift RN. Pt stable at change of shift. Problem: Patient Centered Care  Goal: Patient preferences are identified and integrated in the patient's plan of care  Description: Interventions:  - What would you like us to know as we care for you? I live at home alone  - Provide timely, complete, and accurate information to patient/family  - Incorporate patient and family knowledge, values, beliefs, and cultural backgrounds into the planning and delivery of care  - Encourage patient/family to participate in care and decision-making at the level they choose  - Honor patient and family perspectives and choices  Outcome: Progressing     Problem: SAFETY ADULT - FALL  Goal: Free from fall injury  Description: INTERVENTIONS:  - Assess pt frequently for physical needs  - Identify cognitive and physical deficits and behaviors that affect risk of falls.   - [place] fall precautions as indicated by assessment.  - Educate pt/family on patient safety including physical limitations  - Instruct pt to call for assistance with activity based on assessment  - Modify environment to reduce risk of injury  - Provide assistive devices as appropriate  - Consider OT/PT consult to assist with strengthening/mobility  - Encourage toileting schedule  Outcome: Progressing     Problem: RESPIRATORY - ADULT  Goal: Achieves optimal ventilation and oxygenation  Description: INTERVENTIONS:  - Assess for changes in respiratory status  - Assess for changes in mentation and behavior  - Position to facilitate oxygenation and minimize respiratory effort  - Oxygen supplementation based on oxygen saturation or ABGs  - Provide Smoking Cessation handout, if applicable  - Encourage broncho-pulmonary hygiene including cough, deep breathe, Incentive Spirometry  - Assess the need for suctioning and perform as needed  - Assess and instruct to report SOB or any respiratory difficulty  - Respiratory Therapy support as indicated  - Manage/alleviate anxiety  - Monitor for signs/symptoms of CO2 retention  Outcome: Progressing

## 2022-12-15 NOTE — PROGRESS NOTES
12/15/22 1511   Clinical Encounter Type   Referral From Nurse   Referral To [address] [place] [address] Requests During Visit / Hospitalization [facility] Communion;Communion   Sacramental Encounters   Communion Patient wants communion       Discussion: Patient's nurse called to communicate the patient's request for communion.  explained that the patient will be put on the communion list, and a [facility]  will likely be by tomorrow to administer communion. In the event that the patient does not receive communion, please call the  and let them know so that we can further support the patient with this request.   Chaplains are available for a follow-up visit PRN and can be reached at [phone]. Kerri Canela M.Div, Chaplain Resident.

## 2022-12-15 NOTE — DISCHARGE INSTRUCTIONS
Sometimes managing your health at home requires assistance. The [facility] team has recognized your preference to use [facility]. They can be reached by phone at [phone]. The fax number for your reference is [phone]. A representative from the home health agency will contact you or your family to schedule your first visit. Costa Olvera RN  [facility] Liaison  [phone]    Ok to go to [facility] 2 l nc at all times  Cbc, bmp, mag, phos on 12/26 to be called to dr Felix Quevedo for chemo  Dr Sandra Liu or his designate to see 1-2 days  Pt/ot bid  Speech tx as able  Dnar/select please send polst     Medication List        START taking these medications      acetaminophen 500 MG Tabs  Commonly known as: Tylenol Extra Strength     benzonatate 200 MG Caps  Commonly known as: Tessalon  Take 1 capsule (200 mg total) by mouth 3 (three) times daily as needed for cough. bisacodyl 10 MG Supp  Commonly known as: Dulcolax  Place 1 suppository (10 mg total) rectally daily as needed. docusate sodium 100 MG Caps  Commonly known as: COLACE  Take 100 mg by mouth 2 (two) times daily as needed for constipation. guaiFENesin 100 MG/5 ML   Commonly known as: Robitussin  Take 10 mL (200 mg total) by mouth every 4 (four) hours as needed. lactulose 20 GM/30ML Soln  Commonly known as: ENULOSE  Take 30 mL (20 g total) by mouth every 6 (six) hours as needed. melatonin 1 MG Tabs  Take 1 tablet (1 mg total) by mouth nightly. Polyethylene Glycol 3350 17 g Pack  Commonly known as: MIRALAX  Take 17 g by mouth daily as needed. predniSONE 10 MG Tabs  Commonly known as: Deltasone  Take 3 tablets (30 mg total) by mouth daily with breakfast for 2 days, THEN 2 tablets (20 mg total) daily with breakfast for 3 days, THEN 1 tablet (10 mg total) daily with breakfast for 3 days.   Start taking on: December 23, 2022            CONTINUE taking these medications atorvastatin 40 MG Tabs  Commonly known as: Lipitor     cyanocobalamin 1000 MCG Tabs  Commonly known as: Vitamin B12     ipratropium-albuterol 0.5-2.5 (3) MG/3ML Soln  Commonly known as: Duoneb  Take 3 mL by nebulization every 4 (four) hours as needed.      Omeprazole 40 MG Cpdr     ProAir  (90 Base) MCG/ACT Aers  Generic drug: albuterol     Trelegy Ellipta 100-62.5-25 MCG/ACT Aepb  Generic drug: fluticasone-umeclidin-vilant  INHALE 1 PUFF INTO THE LUNGS DAILY     Vitamin D 50 MCG (2000 UT) Tabs            STOP taking these medications      losartan 50 MG Tabs  Commonly known as: Cozaar               Where to Get Your Medications        You can get these medications from any pharmacy    Bring a paper prescription for each of these medications  benzonatate 200 MG Caps  bisacodyl 10 MG Supp  docusate sodium 100 MG Caps  guaiFENesin 100 MG/5 ML   lactulose 20 GM/30ML Soln  melatonin 1 MG Tabs  Polyethylene Glycol 3350 17 g Pack  predniSONE 10 MG Tabs

## 2022-12-15 NOTE — CM/SW NOTE
CM spoke to patient to see if he has interest in [facility]. Patient expresses that he does not wish to discuss at this time. CM expressed that care coordination is available if he should chose to go this route. / to remain available for support and/or discharge planning.      Lonzie Sandhoff MBA BSN RN [address]  RN Case Manager  [phone]

## 2022-12-16 ENCOUNTER — APPOINTMENT (OUTPATIENT)
Dept: GENERAL RADIOLOGY | Facility: HOSPITAL | Age: 82
End: 2022-12-16
Attending: HOSPITALIST
Payer: MEDICARE

## 2022-12-16 LAB
ALBUMIN SERPL-MCNC: 2.4 G/DL (ref 3.4–5)
ALBUMIN/GLOB SERPL: 0.6 {RATIO} (ref 1–2)
ALP LIVER SERPL-CCNC: 54 U/L
ALT SERPL-CCNC: 17 U/L
ANION GAP SERPL CALC-SCNC: 4 MMOL/L (ref 0–18)
AST SERPL-CCNC: 12 U/L (ref 15–37)
BILIRUB SERPL-MCNC: 0.4 MG/DL (ref 0.1–2)
BUN BLD-MCNC: 48 MG/DL (ref 7–18)
BUN/CREAT SERPL: 27.9 (ref 10–20)
CALCIUM BLD-MCNC: 8.1 MG/DL (ref 8.5–10.1)
CHLORIDE SERPL-SCNC: 104 MMOL/L (ref 98–112)
CO2 SERPL-SCNC: 34 MMOL/L (ref 21–32)
CREAT BLD-MCNC: 1.72 MG/DL
CRP SERPL-MCNC: 0.31 MG/DL (ref ?–0.3)
D DIMER PPP FEU-MCNC: 2.19 UG/ML FEU (ref ?–0.81)
DEPRECATED HBV CORE AB SER IA-ACNC: 148.8 NG/ML
GFR SERPLBLD BASED ON 1.73 SQ M-ARVRAT: 39 ML/MIN/1.73M2 (ref 60–?)
GLOBULIN PLAS-MCNC: 3.7 G/DL (ref 2.8–4.4)
GLUCOSE BLD-MCNC: 137 MG/DL (ref 70–99)
LDH SERPL L TO P-CCNC: 170 U/L
OSMOLALITY SERPL CALC.SUM OF ELEC: 309 MOSM/KG (ref 275–295)
POTASSIUM SERPL-SCNC: 4.6 MMOL/L (ref 3.5–5.1)
PROT SERPL-MCNC: 6.1 G/DL (ref 6.4–8.2)
SODIUM SERPL-SCNC: 142 MMOL/L (ref 136–145)

## 2022-12-16 PROCEDURE — 99233 SBSQ HOSP IP/OBS HIGH 50: CPT | Performed by: INTERNAL MEDICINE

## 2022-12-16 PROCEDURE — 71045 X-RAY EXAM CHEST 1 VIEW: CPT | Performed by: HOSPITALIST

## 2022-12-16 PROCEDURE — 99233 SBSQ HOSP IP/OBS HIGH 50: CPT | Performed by: STUDENT IN AN ORGANIZED HEALTH CARE EDUCATION/TRAINING PROGRAM

## 2022-12-16 PROCEDURE — 99233 SBSQ HOSP IP/OBS HIGH 50: CPT | Performed by: HOSPITALIST

## 2022-12-16 RX ORDER — MAGNESIUM OXIDE 400 MG (241.3 MG MAGNESIUM) TABLET
1 TABLET NIGHTLY PRN
Status: DISCONTINUED | OUTPATIENT
Start: 2022-12-16 | End: 2022-12-23

## 2022-12-16 RX ORDER — METHYLPREDNISOLONE SODIUM SUCCINATE 40 MG/ML
40 INJECTION, POWDER, LYOPHILIZED, FOR SOLUTION INTRAMUSCULAR; INTRAVENOUS EVERY 12 HOURS
Status: DISCONTINUED | OUTPATIENT
Start: 2022-12-16 | End: 2022-12-20

## 2022-12-16 NOTE — PLAN OF CARE
Problem: Patient Centered Care  Goal: Patient preferences are identified and integrated in the patient's plan of care  Description: Interventions:  - What would you like us to know as we care for you? I live at home alone  - Provide timely, complete, and accurate information to patient/family  - Incorporate patient and family knowledge, values, beliefs, and cultural backgrounds into the planning and delivery of care  - Encourage patient/family to participate in care and decision-making at the level they choose  - Honor patient and family perspectives and choices  Outcome: Progressing     Problem: Patient/Family Goals  Goal: Patient/Family Long Term Goal  Description: Patient's Long Term Goal: build up my strength    Interventions:  - PT/OT  -Covid meds  -increase ambulation in room  - See additional Care Plan goals for specific interventions  Outcome: Progressing  Goal: Patient/Family Short Term Goal  Description: Patient's Short Term Goal: feel rested    Interventions:   - minimize night time interuptions  -IV meds  -encourage ambulation with staff during day   - See additional Care Plan goals for specific interventions  Outcome: Progressing     Problem: SAFETY ADULT - FALL  Goal: Free from fall injury  Description: INTERVENTIONS:  - Assess pt frequently for physical needs  - Identify cognitive and physical deficits and behaviors that affect risk of falls.   - [place] fall precautions as indicated by assessment.  - Educate pt/family on patient safety including physical limitations  - Instruct pt to call for assistance with activity based on assessment  - Modify environment to reduce risk of injury  - Provide assistive devices as appropriate  - Consider OT/PT consult to assist with strengthening/mobility  - Encourage toileting schedule  Outcome: Progressing     Problem: DISCHARGE PLANNING  Goal: Discharge to home or other facility with appropriate resources  Description: INTERVENTIONS:  - Identify barriers to discharge w/pt and caregiver  - Include patient/family/discharge partner in discharge planning  - Arrange for needed discharge resources and transportation as appropriate  - Identify discharge learning needs (meds, wound care, etc)  - Arrange for interpreters to assist at discharge as needed  - Consider post-discharge preferences of patient/family/discharge partner  - Complete POLST form as appropriate  - Assess patient's ability to be responsible for managing their own health  - Refer to Case Management Department for coordinating discharge planning if the patient needs post-hospital services based on physician/LIP order or complex needs related to functional status, cognitive ability or social support system  Outcome: Progressing     Problem: RESPIRATORY - ADULT  Goal: Achieves optimal ventilation and oxygenation  Description: INTERVENTIONS:  - Assess for changes in respiratory status  - Assess for changes in mentation and behavior  - Position to facilitate oxygenation and minimize respiratory effort  - Oxygen supplementation based on oxygen saturation or ABGs  - Provide Smoking Cessation handout, if applicable  - Encourage broncho-pulmonary hygiene including cough, deep breathe, Incentive Spirometry  - Assess the need for suctioning and perform as needed  - Assess and instruct to report SOB or any respiratory difficulty  - Respiratory Therapy support as indicated  - Manage/alleviate anxiety  - Monitor for signs/symptoms of CO2 retention  Outcome: Progressing    Patient in no acute distress, no acute changes, denies shortness of breath, precautions maintained, plan of care reviewed

## 2022-12-16 NOTE — PLAN OF CARE
Patient is alert and oriented x4. On 2L NC, denies SOB, vitals stable. Educated patient on plan of care, and general education. Call light within reach. Bed in lowest position. Fall precautions in place. All needs addressed. Hourly rounding maintained. Report given to night shift RN. Pt stable at change of shift. Problem: Patient Centered Care  Goal: Patient preferences are identified and integrated in the patient's plan of care  Description: Interventions:  - What would you like us to know as we care for you? I live at home alone  - Provide timely, complete, and accurate information to patient/family  - Incorporate patient and family knowledge, values, beliefs, and cultural backgrounds into the planning and delivery of care  - Encourage patient/family to participate in care and decision-making at the level they choose  - Honor patient and family perspectives and choices  Outcome: Progressing     Problem: SAFETY ADULT - FALL  Goal: Free from fall injury  Description: INTERVENTIONS:  - Assess pt frequently for physical needs  - Identify cognitive and physical deficits and behaviors that affect risk of falls.   - [place] fall precautions as indicated by assessment.  - Educate pt/family on patient safety including physical limitations  - Instruct pt to call for assistance with activity based on assessment  - Modify environment to reduce risk of injury  - Provide assistive devices as appropriate  - Consider OT/PT consult to assist with strengthening/mobility  - Encourage toileting schedule  Outcome: Progressing

## 2022-12-17 LAB
ALBUMIN SERPL-MCNC: 2.3 G/DL (ref 3.4–5)
ALBUMIN/GLOB SERPL: 0.6 {RATIO} (ref 1–2)
ALP LIVER SERPL-CCNC: 49 U/L
ALT SERPL-CCNC: 18 U/L
ANION GAP SERPL CALC-SCNC: 0 MMOL/L (ref 0–18)
AST SERPL-CCNC: 12 U/L (ref 15–37)
BASOPHILS # BLD AUTO: 0.01 X10(3) UL (ref 0–0.2)
BASOPHILS NFR BLD AUTO: 0.1 %
BILIRUB SERPL-MCNC: 0.3 MG/DL (ref 0.1–2)
BUN BLD-MCNC: 50 MG/DL (ref 7–18)
BUN/CREAT SERPL: 31.8 (ref 10–20)
CALCIUM BLD-MCNC: 8 MG/DL (ref 8.5–10.1)
CHLORIDE SERPL-SCNC: 106 MMOL/L (ref 98–112)
CO2 SERPL-SCNC: 36 MMOL/L (ref 21–32)
CREAT BLD-MCNC: 1.57 MG/DL
DEPRECATED RDW RBC AUTO: 49.5 FL (ref 35.1–46.3)
EOSINOPHIL # BLD AUTO: 0 X10(3) UL (ref 0–0.7)
EOSINOPHIL NFR BLD AUTO: 0 %
ERYTHROCYTE [DISTWIDTH] IN BLOOD BY AUTOMATED COUNT: 13.2 % (ref 11–15)
GFR SERPLBLD BASED ON 1.73 SQ M-ARVRAT: 44 ML/MIN/1.73M2 (ref 60–?)
GLOBULIN PLAS-MCNC: 3.6 G/DL (ref 2.8–4.4)
GLUCOSE BLD-MCNC: 137 MG/DL (ref 70–99)
HCT VFR BLD AUTO: 30.5 %
HGB BLD-MCNC: 9.3 G/DL
IMM GRANULOCYTES # BLD AUTO: 0.13 X10(3) UL (ref 0–1)
IMM GRANULOCYTES NFR BLD: 1.1 %
LYMPHOCYTES # BLD AUTO: 0.38 X10(3) UL (ref 1–4)
LYMPHOCYTES NFR BLD AUTO: 3.1 %
MCH RBC QN AUTO: 31.3 PG (ref 26–34)
MCHC RBC AUTO-ENTMCNC: 30.5 G/DL (ref 31–37)
MCV RBC AUTO: 102.7 FL
MONOCYTES # BLD AUTO: 0.49 X10(3) UL (ref 0.1–1)
MONOCYTES NFR BLD AUTO: 4 %
NEUTROPHILS # BLD AUTO: 11.24 X10 (3) UL (ref 1.5–7.7)
NEUTROPHILS # BLD AUTO: 11.24 X10(3) UL (ref 1.5–7.7)
NEUTROPHILS NFR BLD AUTO: 91.7 %
OSMOLALITY SERPL CALC.SUM OF ELEC: 309 MOSM/KG (ref 275–295)
PLATELET # BLD AUTO: 240 10(3)UL (ref 150–450)
POTASSIUM SERPL-SCNC: 5.5 MMOL/L (ref 3.5–5.1)
PROT SERPL-MCNC: 5.9 G/DL (ref 6.4–8.2)
RBC # BLD AUTO: 2.97 X10(6)UL
SODIUM SERPL-SCNC: 142 MMOL/L (ref 136–145)
WBC # BLD AUTO: 12.3 X10(3) UL (ref 4–11)

## 2022-12-17 PROCEDURE — 99233 SBSQ HOSP IP/OBS HIGH 50: CPT | Performed by: HOSPITALIST

## 2022-12-17 PROCEDURE — 99233 SBSQ HOSP IP/OBS HIGH 50: CPT | Performed by: INTERNAL MEDICINE

## 2022-12-17 NOTE — PLAN OF CARE
Problem: Patient Centered Care  Goal: Patient preferences are identified and integrated in the patient's plan of care  Description: Interventions:  - What would you like us to know as we care for you? I live at home alone  - Provide timely, complete, and accurate information to patient/family  - Incorporate patient and family knowledge, values, beliefs, and cultural backgrounds into the planning and delivery of care  - Encourage patient/family to participate in care and decision-making at the level they choose  - Honor patient and family perspectives and choices  Outcome: Progressing     Problem: Patient/Family Goals  Goal: Patient/Family Long Term Goal  Description: Patient's Long Term Goal: build up my strength    Interventions:  - PT/OT  -Covid meds  -increase ambulation in room  - See additional Care Plan goals for specific interventions  Outcome: Progressing  Goal: Patient/Family Short Term Goal  Description: Patient's Short Term Goal: feel rested    Interventions:   - minimize night time interuptions  -IV meds  -encourage ambulation with staff during day   - See additional Care Plan goals for specific interventions  Outcome: Progressing     Problem: SAFETY ADULT - FALL  Goal: Free from fall injury  Description: INTERVENTIONS:  - Assess pt frequently for physical needs  - Identify cognitive and physical deficits and behaviors that affect risk of falls.   - [place] fall precautions as indicated by assessment.  - Educate pt/family on patient safety including physical limitations  - Instruct pt to call for assistance with activity based on assessment  - Modify environment to reduce risk of injury  - Provide assistive devices as appropriate  - Consider OT/PT consult to assist with strengthening/mobility  - Encourage toileting schedule  Outcome: Progressing     Problem: DISCHARGE PLANNING  Goal: Discharge to home or other facility with appropriate resources  Description: INTERVENTIONS:  - Identify barriers to discharge w/pt and caregiver  - Include patient/family/discharge partner in discharge planning  - Arrange for needed discharge resources and transportation as appropriate  - Identify discharge learning needs (meds, wound care, etc)  - Arrange for interpreters to assist at discharge as needed  - Consider post-discharge preferences of patient/family/discharge partner  - Complete POLST form as appropriate  - Assess patient's ability to be responsible for managing their own health  - Refer to Case Management Department for coordinating discharge planning if the patient needs post-hospital services based on physician/LIP order or complex needs related to functional status, cognitive ability or social support system  Outcome: Progressing     Problem: RESPIRATORY - ADULT  Goal: Achieves optimal ventilation and oxygenation  Description: INTERVENTIONS:  - Assess for changes in respiratory status  - Assess for changes in mentation and behavior  - Position to facilitate oxygenation and minimize respiratory effort  - Oxygen supplementation based on oxygen saturation or ABGs  - Provide Smoking Cessation handout, if applicable  - Encourage broncho-pulmonary hygiene including cough, deep breathe, Incentive Spirometry  - Assess the need for suctioning and perform as needed  - Assess and instruct to report SOB or any respiratory difficulty  - Respiratory Therapy support as indicated  - Manage/alleviate anxiety  - Monitor for signs/symptoms of CO2 retention  Outcome: Progressing 12/17/22 covid isolation protocol maintained, spo2 on 2l n/c >90's, moist, non prodcutive cough, on iv steroid, rendesivir(last dose tonite), uses o2 at home also 2l, no temps, up with assistance & encouraged to be up, up with assist, fell at home. A&Ox4. Jamal. Diet, iv fluids at 100cc/hr. Reports unable to prone d/t hurst his abdomen, cough & deep breathing, inc. Spirometry, ventolin inhaler & Trilegy.

## 2022-12-17 NOTE — PLAN OF CARE
Problem: Patient Centered Care  Goal: Patient preferences are identified and integrated in the patient's plan of care  Description: Interventions:  - What would you like us to know as we care for you? I live at home alone  - Provide timely, complete, and accurate information to patient/family  - Incorporate patient and family knowledge, values, beliefs, and cultural backgrounds into the planning and delivery of care  - Encourage patient/family to participate in care and decision-making at the level they choose  - Honor patient and family perspectives and choices  Outcome: Progressing     Problem: Patient/Family Goals  Goal: Patient/Family Long Term Goal  Description: Patient's Long Term Goal: build up my strength    Interventions:  - PT/OT  -Covid meds  -increase ambulation in room  - See additional Care Plan goals for specific interventions  Outcome: Progressing  Goal: Patient/Family Short Term Goal  Description: Patient's Short Term Goal: feel rested    Interventions:   - minimize night time interuptions  -IV meds  -encourage ambulation with staff during day   - See additional Care Plan goals for specific interventions  Outcome: Progressing     Problem: SAFETY ADULT - FALL  Goal: Free from fall injury  Description: INTERVENTIONS:  - Assess pt frequently for physical needs  - Identify cognitive and physical deficits and behaviors that affect risk of falls.   - [place] fall precautions as indicated by assessment.  - Educate pt/family on patient safety including physical limitations  - Instruct pt to call for assistance with activity based on assessment  - Modify environment to reduce risk of injury  - Provide assistive devices as appropriate  - Consider OT/PT consult to assist with strengthening/mobility  - Encourage toileting schedule  Outcome: Progressing     Problem: DISCHARGE PLANNING  Goal: Discharge to home or other facility with appropriate resources  Description: INTERVENTIONS:  - Identify barriers to discharge w/pt and caregiver  - Include patient/family/discharge partner in discharge planning  - Arrange for needed discharge resources and transportation as appropriate  - Identify discharge learning needs (meds, wound care, etc)  - Arrange for interpreters to assist at discharge as needed  - Consider post-discharge preferences of patient/family/discharge partner  - Complete POLST form as appropriate  - Assess patient's ability to be responsible for managing their own health  - Refer to Case Management Department for coordinating discharge planning if the patient needs post-hospital services based on physician/LIP order or complex needs related to functional status, cognitive ability or social support system  Outcome: Progressing     Problem: RESPIRATORY - ADULT  Goal: Achieves optimal ventilation and oxygenation  Description: INTERVENTIONS:  - Assess for changes in respiratory status  - Assess for changes in mentation and behavior  - Position to facilitate oxygenation and minimize respiratory effort  - Oxygen supplementation based on oxygen saturation or ABGs  - Provide Smoking Cessation handout, if applicable  - Encourage broncho-pulmonary hygiene including cough, deep breathe, Incentive Spirometry  - Assess the need for suctioning and perform as needed  - Assess and instruct to report SOB or any respiratory difficulty  - Respiratory Therapy support as indicated  - Manage/alleviate anxiety  - Monitor for signs/symptoms of CO2 retention  Outcome: Progressing    Patient in no acute distress, denies shortness of breath, precautions maintained, plan of care reviewed

## 2022-12-17 NOTE — PLAN OF CARE
Problem: Patient Centered Care  Goal: Patient preferences are identified and integrated in the patient's plan of care  Description: Interventions:  - What would you like us to know as we care for you? I live at home alone  - Provide timely, complete, and accurate information to patient/family  - Incorporate patient and family knowledge, values, beliefs, and cultural backgrounds into the planning and delivery of care  - Encourage patient/family to participate in care and decision-making at the level they choose  - Honor patient and family perspectives and choices  Outcome: Progressing     Problem: Patient/Family Goals  Goal: Patient/Family Long Term Goal  Description: Patient's Long Term Goal: build up my strength    Interventions:  - PT/OT  -Covid meds  -increase ambulation in room  - See additional Care Plan goals for specific interventions  Outcome: Progressing  Goal: Patient/Family Short Term Goal  Description: Patient's Short Term Goal: feel rested    Interventions:   - minimize night time interuptions  -IV meds  -encourage ambulation with staff during day   - See additional Care Plan goals for specific interventions  Outcome: Progressing     Problem: SAFETY ADULT - FALL  Goal: Free from fall injury  Description: INTERVENTIONS:  - Assess pt frequently for physical needs  - Identify cognitive and physical deficits and behaviors that affect risk of falls.   - [place] fall precautions as indicated by assessment.  - Educate pt/family on patient safety including physical limitations  - Instruct pt to call for assistance with activity based on assessment  - Modify environment to reduce risk of injury  - Provide assistive devices as appropriate  - Consider OT/PT consult to assist with strengthening/mobility  - Encourage toileting schedule  Outcome: Progressing     Problem: DISCHARGE PLANNING  Goal: Discharge to home or other facility with appropriate resources  Description: INTERVENTIONS:  - Identify barriers to discharge w/pt and caregiver  - Include patient/family/discharge partner in discharge planning  - Arrange for needed discharge resources and transportation as appropriate  - Identify discharge learning needs (meds, wound care, etc)  - Arrange for interpreters to assist at discharge as needed  - Consider post-discharge preferences of patient/family/discharge partner  - Complete POLST form as appropriate  - Assess patient's ability to be responsible for managing their own health  - Refer to Case Management Department for coordinating discharge planning if the patient needs post-hospital services based on physician/LIP order or complex needs related to functional status, cognitive ability or social support system  Outcome: Progressing     Problem: RESPIRATORY - ADULT  Goal: Achieves optimal ventilation and oxygenation  Description: INTERVENTIONS:  - Assess for changes in respiratory status  - Assess for changes in mentation and behavior  - Position to facilitate oxygenation and minimize respiratory effort  - Oxygen supplementation based on oxygen saturation or ABGs  - Provide Smoking Cessation handout, if applicable  - Encourage broncho-pulmonary hygiene including cough, deep breathe, Incentive Spirometry  - Assess the need for suctioning and perform as needed  - Assess and instruct to report SOB or any respiratory difficulty  - Respiratory Therapy support as indicated  - Manage/alleviate anxiety  - Monitor for signs/symptoms of CO2 retention  Outcome: Progressing   Pt up to the chair with one assist. IV steroids changed from TID to BID. Fourth day of Remdesivir given. Patient remains on 2L.

## 2022-12-18 LAB
ANION GAP SERPL CALC-SCNC: 1 MMOL/L (ref 0–18)
BASOPHILS # BLD AUTO: 0.01 X10(3) UL (ref 0–0.2)
BASOPHILS NFR BLD AUTO: 0.1 %
BUN BLD-MCNC: 50 MG/DL (ref 7–18)
BUN/CREAT SERPL: 35.5 (ref 10–20)
CALCIUM BLD-MCNC: 8 MG/DL (ref 8.5–10.1)
CHLORIDE SERPL-SCNC: 107 MMOL/L (ref 98–112)
CO2 SERPL-SCNC: 34 MMOL/L (ref 21–32)
CREAT BLD-MCNC: 1.41 MG/DL
DEPRECATED RDW RBC AUTO: 51 FL (ref 35.1–46.3)
EOSINOPHIL # BLD AUTO: 0 X10(3) UL (ref 0–0.7)
EOSINOPHIL NFR BLD AUTO: 0 %
ERYTHROCYTE [DISTWIDTH] IN BLOOD BY AUTOMATED COUNT: 13.2 % (ref 11–15)
GFR SERPLBLD BASED ON 1.73 SQ M-ARVRAT: 50 ML/MIN/1.73M2 (ref 60–?)
GLUCOSE BLD-MCNC: 143 MG/DL (ref 70–99)
HCT VFR BLD AUTO: 33.5 %
HGB BLD-MCNC: 10.1 G/DL
IMM GRANULOCYTES # BLD AUTO: 0.09 X10(3) UL (ref 0–1)
IMM GRANULOCYTES NFR BLD: 0.8 %
LYMPHOCYTES # BLD AUTO: 0.37 X10(3) UL (ref 1–4)
LYMPHOCYTES NFR BLD AUTO: 3.3 %
MCH RBC QN AUTO: 31.8 PG (ref 26–34)
MCHC RBC AUTO-ENTMCNC: 30.1 G/DL (ref 31–37)
MCV RBC AUTO: 105.3 FL
MONOCYTES # BLD AUTO: 0.42 X10(3) UL (ref 0.1–1)
MONOCYTES NFR BLD AUTO: 3.7 %
NEUTROPHILS # BLD AUTO: 10.49 X10 (3) UL (ref 1.5–7.7)
NEUTROPHILS # BLD AUTO: 10.49 X10(3) UL (ref 1.5–7.7)
NEUTROPHILS NFR BLD AUTO: 92.1 %
NT-PROBNP SERPL-MCNC: 702 PG/ML (ref ?–450)
OSMOLALITY SERPL CALC.SUM OF ELEC: 310 MOSM/KG (ref 275–295)
PLATELET # BLD AUTO: 241 10(3)UL (ref 150–450)
POTASSIUM SERPL-SCNC: 5.7 MMOL/L (ref 3.5–5.1)
RBC # BLD AUTO: 3.18 X10(6)UL
SODIUM SERPL-SCNC: 142 MMOL/L (ref 136–145)
WBC # BLD AUTO: 11.4 X10(3) UL (ref 4–11)

## 2022-12-18 PROCEDURE — 99232 SBSQ HOSP IP/OBS MODERATE 35: CPT | Performed by: PHYSICIAN ASSISTANT

## 2022-12-18 PROCEDURE — 99233 SBSQ HOSP IP/OBS HIGH 50: CPT | Performed by: HOSPITALIST

## 2022-12-18 RX ORDER — DOCUSATE SODIUM 100 MG/1
100 CAPSULE, LIQUID FILLED ORAL 2 TIMES DAILY
Status: DISCONTINUED | OUTPATIENT
Start: 2022-12-18 | End: 2022-12-23

## 2022-12-18 RX ORDER — POLYETHYLENE GLYCOL 3350 17 G/17G
17 POWDER, FOR SOLUTION ORAL DAILY PRN
Status: DISCONTINUED | OUTPATIENT
Start: 2022-12-18 | End: 2022-12-23

## 2022-12-18 RX ORDER — SODIUM POLYSTYRENE SULFONATE 4.1 MEQ/G
15 POWDER, FOR SUSPENSION ORAL; RECTAL ONCE
Status: COMPLETED | OUTPATIENT
Start: 2022-12-18 | End: 2022-12-18

## 2022-12-18 RX ORDER — SODIUM PHOSPHATE, DIBASIC AND SODIUM PHOSPHATE, MONOBASIC 7; 19 G/133ML; G/133ML
1 ENEMA RECTAL ONCE AS NEEDED
Status: DISCONTINUED | OUTPATIENT
Start: 2022-12-18 | End: 2022-12-23

## 2022-12-18 RX ORDER — BISACODYL 10 MG
10 SUPPOSITORY, RECTAL RECTAL
Status: DISCONTINUED | OUTPATIENT
Start: 2022-12-18 | End: 2022-12-23

## 2022-12-18 RX ORDER — HEPARIN SODIUM 5000 [USP'U]/ML
5000 INJECTION, SOLUTION INTRAVENOUS; SUBCUTANEOUS EVERY 8 HOURS SCHEDULED
Status: DISCONTINUED | OUTPATIENT
Start: 2022-12-18 | End: 2022-12-23

## 2022-12-18 NOTE — PLAN OF CARE
Problem: Patient Centered Care  Goal: Patient preferences are identified and integrated in the patient's plan of care  Description: Interventions:  - What would you like us to know as we care for you? I live at home alone  - Provide timely, complete, and accurate information to patient/family  - Incorporate patient and family knowledge, values, beliefs, and cultural backgrounds into the planning and delivery of care  - Encourage patient/family to participate in care and decision-making at the level they choose  - Honor patient and family perspectives and choices  Outcome: Progressing     Problem: Patient/Family Goals  Goal: Patient/Family Long Term Goal  Description: Patient's Long Term Goal: build up my strength    Interventions:  - PT/OT  -Covid meds  -increase ambulation in room  - See additional Care Plan goals for specific interventions  Outcome: Progressing  Goal: Patient/Family Short Term Goal  Description: Patient's Short Term Goal: feel rested    Interventions:   - minimize night time interuptions  -IV meds  -encourage ambulation with staff during day   - See additional Care Plan goals for specific interventions  Outcome: Progressing     Problem: SAFETY ADULT - FALL  Goal: Free from fall injury  Description: INTERVENTIONS:  - Assess pt frequently for physical needs  - Identify cognitive and physical deficits and behaviors that affect risk of falls.   - [place] fall precautions as indicated by assessment.  - Educate pt/family on patient safety including physical limitations  - Instruct pt to call for assistance with activity based on assessment  - Modify environment to reduce risk of injury  - Provide assistive devices as appropriate  - Consider OT/PT consult to assist with strengthening/mobility  - Encourage toileting schedule  Outcome: Progressing     Problem: DISCHARGE PLANNING  Goal: Discharge to home or other facility with appropriate resources  Description: INTERVENTIONS:  - Identify barriers to discharge w/pt and caregiver  - Include patient/family/discharge partner in discharge planning  - Arrange for needed discharge resources and transportation as appropriate  - Identify discharge learning needs (meds, wound care, etc)  - Arrange for interpreters to assist at discharge as needed  - Consider post-discharge preferences of patient/family/discharge partner  - Complete POLST form as appropriate  - Assess patient's ability to be responsible for managing their own health  - Refer to Case Management Department for coordinating discharge planning if the patient needs post-hospital services based on physician/LIP order or complex needs related to functional status, cognitive ability or social support system  Outcome: Progressing     Problem: RESPIRATORY - ADULT  Goal: Achieves optimal ventilation and oxygenation  Description: INTERVENTIONS:  - Assess for changes in respiratory status  - Assess for changes in mentation and behavior  - Position to facilitate oxygenation and minimize respiratory effort  - Oxygen supplementation based on oxygen saturation or ABGs  - Provide Smoking Cessation handout, if applicable  - Encourage broncho-pulmonary hygiene including cough, deep breathe, Incentive Spirometry  - Assess the need for suctioning and perform as needed  - Assess and instruct to report SOB or any respiratory difficulty  - Respiratory Therapy support as indicated  - Manage/alleviate anxiety  - Monitor for signs/symptoms of CO2 retention  Outcome: Progressing     Monitoring vital signs, stable at this moment, patient on 2L oxygen at baseline. Wound care provided. Call light within reach, bed locked in lowest position, all fall precautions in place. All needs addressed. Frequent rounding maintained by nursing staff.

## 2022-12-19 ENCOUNTER — APPOINTMENT (OUTPATIENT)
Dept: RADIATION ONCOLOGY | Facility: HOSPITAL | Age: 82
End: 2022-12-19
Attending: RADIOLOGY
Payer: MEDICARE

## 2022-12-19 LAB
ANION GAP SERPL CALC-SCNC: 0 MMOL/L (ref 0–18)
BASOPHILS # BLD AUTO: 0.01 X10(3) UL (ref 0–0.2)
BASOPHILS NFR BLD AUTO: 0.1 %
BUN BLD-MCNC: 43 MG/DL (ref 7–18)
BUN/CREAT SERPL: 31.9 (ref 10–20)
CALCIUM BLD-MCNC: 8 MG/DL (ref 8.5–10.1)
CHLORIDE SERPL-SCNC: 106 MMOL/L (ref 98–112)
CO2 SERPL-SCNC: 35 MMOL/L (ref 21–32)
CREAT BLD-MCNC: 1.35 MG/DL
DEPRECATED RDW RBC AUTO: 50 FL (ref 35.1–46.3)
EOSINOPHIL # BLD AUTO: 0 X10(3) UL (ref 0–0.7)
EOSINOPHIL NFR BLD AUTO: 0 %
ERYTHROCYTE [DISTWIDTH] IN BLOOD BY AUTOMATED COUNT: 13.2 % (ref 11–15)
GFR SERPLBLD BASED ON 1.73 SQ M-ARVRAT: 53 ML/MIN/1.73M2 (ref 60–?)
GLUCOSE BLD-MCNC: 136 MG/DL (ref 70–99)
HCT VFR BLD AUTO: 33 %
HGB BLD-MCNC: 10.3 G/DL
IMM GRANULOCYTES # BLD AUTO: 0.07 X10(3) UL (ref 0–1)
IMM GRANULOCYTES NFR BLD: 0.6 %
LYMPHOCYTES # BLD AUTO: 0.38 X10(3) UL (ref 1–4)
LYMPHOCYTES NFR BLD AUTO: 3.1 %
MCH RBC QN AUTO: 32 PG (ref 26–34)
MCHC RBC AUTO-ENTMCNC: 31.2 G/DL (ref 31–37)
MCV RBC AUTO: 102.5 FL
MONOCYTES # BLD AUTO: 0.41 X10(3) UL (ref 0.1–1)
MONOCYTES NFR BLD AUTO: 3.4 %
NEUTROPHILS # BLD AUTO: 11.31 X10 (3) UL (ref 1.5–7.7)
NEUTROPHILS # BLD AUTO: 11.31 X10(3) UL (ref 1.5–7.7)
NEUTROPHILS NFR BLD AUTO: 92.8 %
OSMOLALITY SERPL CALC.SUM OF ELEC: 305 MOSM/KG (ref 275–295)
PLATELET # BLD AUTO: 232 10(3)UL (ref 150–450)
POTASSIUM SERPL-SCNC: 5.3 MMOL/L (ref 3.5–5.1)
RBC # BLD AUTO: 3.22 X10(6)UL
SODIUM SERPL-SCNC: 141 MMOL/L (ref 136–145)
WBC # BLD AUTO: 12.2 X10(3) UL (ref 4–11)

## 2022-12-19 PROCEDURE — 99233 SBSQ HOSP IP/OBS HIGH 50: CPT | Performed by: HOSPITALIST

## 2022-12-19 PROCEDURE — 99233 SBSQ HOSP IP/OBS HIGH 50: CPT | Performed by: STUDENT IN AN ORGANIZED HEALTH CARE EDUCATION/TRAINING PROGRAM

## 2022-12-19 PROCEDURE — 99233 SBSQ HOSP IP/OBS HIGH 50: CPT | Performed by: INTERNAL MEDICINE

## 2022-12-19 NOTE — PLAN OF CARE
Patient resting well overnight, no acute changes. Problem: Patient Centered Care  Goal: Patient preferences are identified and integrated in the patient's plan of care  Description: Interventions:  - What would you like us to know as we care for you? I live at home alone  - Provide timely, complete, and accurate information to patient/family  - Incorporate patient and family knowledge, values, beliefs, and cultural backgrounds into the planning and delivery of care  - Encourage patient/family to participate in care and decision-making at the level they choose  - Honor patient and family perspectives and choices  Outcome: Progressing     Problem: Patient/Family Goals  Goal: Patient/Family Long Term Goal  Description: Patient's Long Term Goal: build up my strength    Interventions:  - PT/OT  -Covid meds  -increase ambulation in room  - See additional Care Plan goals for specific interventions  Outcome: Progressing  Goal: Patient/Family Short Term Goal  Description: Patient's Short Term Goal: feel rested    Interventions:   - minimize night time interuptions  -IV meds  -encourage ambulation with staff during day   - See additional Care Plan goals for specific interventions  Outcome: Progressing     Problem: SAFETY ADULT - FALL  Goal: Free from fall injury  Description: INTERVENTIONS:  - Assess pt frequently for physical needs  - Identify cognitive and physical deficits and behaviors that affect risk of falls.   - [place] fall precautions as indicated by assessment.  - Educate pt/family on patient safety including physical limitations  - Instruct pt to call for assistance with activity based on assessment  - Modify environment to reduce risk of injury  - Provide assistive devices as appropriate  - Consider OT/PT consult to assist with strengthening/mobility  - Encourage toileting schedule  Outcome: Progressing     Problem: DISCHARGE PLANNING  Goal: Discharge to home or other facility with appropriate resources  Description: INTERVENTIONS:  - Identify barriers to discharge w/pt and caregiver  - Include patient/family/discharge partner in discharge planning  - Arrange for needed discharge resources and transportation as appropriate  - Identify discharge learning needs (meds, wound care, etc)  - Arrange for interpreters to assist at discharge as needed  - Consider post-discharge preferences of patient/family/discharge partner  - Complete POLST form as appropriate  - Assess patient's ability to be responsible for managing their own health  - Refer to Case Management Department for coordinating discharge planning if the patient needs post-hospital services based on physician/LIP order or complex needs related to functional status, cognitive ability or social support system  Outcome: Progressing     Problem: RESPIRATORY - ADULT  Goal: Achieves optimal ventilation and oxygenation  Description: INTERVENTIONS:  - Assess for changes in respiratory status  - Assess for changes in mentation and behavior  - Position to facilitate oxygenation and minimize respiratory effort  - Oxygen supplementation based on oxygen saturation or ABGs  - Provide Smoking Cessation handout, if applicable  - Encourage broncho-pulmonary hygiene including cough, deep breathe, Incentive Spirometry  - Assess the need for suctioning and perform as needed  - Assess and instruct to report SOB or any respiratory difficulty  - Respiratory Therapy support as indicated  - Manage/alleviate anxiety  - Monitor for signs/symptoms of CO2 retention  Outcome: Progressing

## 2022-12-19 NOTE — CM/SW NOTE
Pt deferring [facility] choice to sister Sebas Hdez. Delia notified CM that she still has not made a decision but will have one by tomorrow afternoon. CM will follow up w/Delia tomorrow. Plan: [facility] pending facility choice and medical clearance.     Omega Landaverde, IANN    [phone]

## 2022-12-19 NOTE — PLAN OF CARE
Problem: Patient Centered Care  Goal: Patient preferences are identified and integrated in the patient's plan of care  Description: Interventions:  - What would you like us to know as we care for you? I live at home alone  - Provide timely, complete, and accurate information to patient/family  - Incorporate patient and family knowledge, values, beliefs, and cultural backgrounds into the planning and delivery of care  - Encourage patient/family to participate in care and decision-making at the level they choose  - Honor patient and family perspectives and choices  Outcome: Progressing     Problem: Patient/Family Goals  Goal: Patient/Family Long Term Goal  Description: Patient's Long Term Goal: build up my strength    Interventions:  - PT/OT  -Covid meds  -increase ambulation in room  - See additional Care Plan goals for specific interventions  Outcome: Progressing  Goal: Patient/Family Short Term Goal  Description: Patient's Short Term Goal: feel rested    Interventions:   - minimize night time interuptions  -IV meds  -encourage ambulation with staff during day   - See additional Care Plan goals for specific interventions  Outcome: Progressing     Problem: SAFETY ADULT - FALL  Goal: Free from fall injury  Description: INTERVENTIONS:  - Assess pt frequently for physical needs  - Identify cognitive and physical deficits and behaviors that affect risk of falls.   - [place] fall precautions as indicated by assessment.  - Educate pt/family on patient safety including physical limitations  - Instruct pt to call for assistance with activity based on assessment  - Modify environment to reduce risk of injury  - Provide assistive devices as appropriate  - Consider OT/PT consult to assist with strengthening/mobility  - Encourage toileting schedule  Outcome: Progressing     Problem: DISCHARGE PLANNING  Goal: Discharge to home or other facility with appropriate resources  Description: INTERVENTIONS:  - Identify barriers to discharge w/pt and caregiver  - Include patient/family/discharge partner in discharge planning  - Arrange for needed discharge resources and transportation as appropriate  - Identify discharge learning needs (meds, wound care, etc)  - Arrange for interpreters to assist at discharge as needed  - Consider post-discharge preferences of patient/family/discharge partner  - Complete POLST form as appropriate  - Assess patient's ability to be responsible for managing their own health  - Refer to Case Management Department for coordinating discharge planning if the patient needs post-hospital services based on physician/LIP order or complex needs related to functional status, cognitive ability or social support system  Outcome: Progressing     Problem: RESPIRATORY - ADULT  Goal: Achieves optimal ventilation and oxygenation  Description: INTERVENTIONS:  - Assess for changes in respiratory status  - Assess for changes in mentation and behavior  - Position to facilitate oxygenation and minimize respiratory effort  - Oxygen supplementation based on oxygen saturation or ABGs  - Provide Smoking Cessation handout, if applicable  - Encourage broncho-pulmonary hygiene including cough, deep breathe, Incentive Spirometry  - Assess the need for suctioning and perform as needed  - Assess and instruct to report SOB or any respiratory difficulty  - Respiratory Therapy support as indicated  - Manage/alleviate anxiety  - Monitor for signs/symptoms of CO2 retention  Outcome: Progressing   Patient a/ox4; denies pain; VSS; given Kyaxelate for elevated K+; large BM today; prompt incontinent care; maintaining >95% on 2L NC; call light within reach.

## 2022-12-20 ENCOUNTER — APPOINTMENT (OUTPATIENT)
Dept: GENERAL RADIOLOGY | Facility: HOSPITAL | Age: 82
End: 2022-12-20
Attending: INTERNAL MEDICINE
Payer: MEDICARE

## 2022-12-20 LAB
ALBUMIN SERPL-MCNC: 2.3 G/DL (ref 3.4–5)
ANION GAP SERPL CALC-SCNC: 0 MMOL/L (ref 0–18)
BASOPHILS # BLD AUTO: 0.01 X10(3) UL (ref 0–0.2)
BASOPHILS NFR BLD AUTO: 0.1 %
BUN BLD-MCNC: 45 MG/DL (ref 7–18)
BUN/CREAT SERPL: 33.6 (ref 10–20)
CALCIUM BLD-MCNC: 8.4 MG/DL (ref 8.5–10.1)
CHLORIDE SERPL-SCNC: 105 MMOL/L (ref 98–112)
CO2 SERPL-SCNC: 36 MMOL/L (ref 21–32)
CREAT BLD-MCNC: 1.34 MG/DL
DEPRECATED RDW RBC AUTO: 50.4 FL (ref 35.1–46.3)
EOSINOPHIL # BLD AUTO: 0 X10(3) UL (ref 0–0.7)
EOSINOPHIL NFR BLD AUTO: 0 %
ERYTHROCYTE [DISTWIDTH] IN BLOOD BY AUTOMATED COUNT: 13.2 % (ref 11–15)
GFR SERPLBLD BASED ON 1.73 SQ M-ARVRAT: 53 ML/MIN/1.73M2 (ref 60–?)
GLUCOSE BLD-MCNC: 126 MG/DL (ref 70–99)
HCT VFR BLD AUTO: 33 %
HGB BLD-MCNC: 10.1 G/DL
IMM GRANULOCYTES # BLD AUTO: 0.1 X10(3) UL (ref 0–1)
IMM GRANULOCYTES NFR BLD: 0.6 %
LYMPHOCYTES # BLD AUTO: 0.36 X10(3) UL (ref 1–4)
LYMPHOCYTES NFR BLD AUTO: 2.3 %
MCH RBC QN AUTO: 31.5 PG (ref 26–34)
MCHC RBC AUTO-ENTMCNC: 30.6 G/DL (ref 31–37)
MCV RBC AUTO: 102.8 FL
MONOCYTES # BLD AUTO: 0.76 X10(3) UL (ref 0.1–1)
MONOCYTES NFR BLD AUTO: 4.9 %
NEUTROPHILS # BLD AUTO: 14.32 X10 (3) UL (ref 1.5–7.7)
NEUTROPHILS # BLD AUTO: 14.32 X10(3) UL (ref 1.5–7.7)
NEUTROPHILS NFR BLD AUTO: 92.1 %
OSMOLALITY SERPL CALC.SUM OF ELEC: 305 MOSM/KG (ref 275–295)
PHOSPHATE SERPL-MCNC: 3.2 MG/DL (ref 2.5–4.9)
PLATELET # BLD AUTO: 234 10(3)UL (ref 150–450)
POTASSIUM SERPL-SCNC: 5.6 MMOL/L (ref 3.5–5.1)
PROCALCITONIN SERPL-MCNC: 0.15 NG/ML (ref ?–0.16)
RBC # BLD AUTO: 3.21 X10(6)UL
SODIUM SERPL-SCNC: 141 MMOL/L (ref 136–145)
WBC # BLD AUTO: 15.6 X10(3) UL (ref 4–11)

## 2022-12-20 PROCEDURE — 71045 X-RAY EXAM CHEST 1 VIEW: CPT | Performed by: INTERNAL MEDICINE

## 2022-12-20 PROCEDURE — 99232 SBSQ HOSP IP/OBS MODERATE 35: CPT | Performed by: STUDENT IN AN ORGANIZED HEALTH CARE EDUCATION/TRAINING PROGRAM

## 2022-12-20 PROCEDURE — 99232 SBSQ HOSP IP/OBS MODERATE 35: CPT | Performed by: INTERNAL MEDICINE

## 2022-12-20 PROCEDURE — 99233 SBSQ HOSP IP/OBS HIGH 50: CPT | Performed by: HOSPITALIST

## 2022-12-20 RX ORDER — PREDNISONE 20 MG/1
40 TABLET ORAL
Status: DISCONTINUED | OUTPATIENT
Start: 2022-12-20 | End: 2022-12-21

## 2022-12-20 NOTE — PLAN OF CARE
Problem: Patient Centered Care  Goal: Patient preferences are identified and integrated in the patient's plan of care  Description: Interventions:  - What would you like us to know as we care for you? I live at home alone  - Provide timely, complete, and accurate information to patient/family  - Incorporate patient and family knowledge, values, beliefs, and cultural backgrounds into the planning and delivery of care  - Encourage patient/family to participate in care and decision-making at the level they choose  - Honor patient and family perspectives and choices  Outcome: Progressing     Problem: Patient/Family Goals  Goal: Patient/Family Long Term Goal  Description: Patient's Long Term Goal: build up my strength    Interventions:  - PT/OT  -Covid meds  -increase ambulation in room  - See additional Care Plan goals for specific interventions  Outcome: Progressing  Goal: Patient/Family Short Term Goal  Description: Patient's Short Term Goal: feel rested    Interventions:   - minimize night time interuptions  -IV meds  -encourage ambulation with staff during day   - See additional Care Plan goals for specific interventions  Outcome: Progressing     Problem: SAFETY ADULT - FALL  Goal: Free from fall injury  Description: INTERVENTIONS:  - Assess pt frequently for physical needs  - Identify cognitive and physical deficits and behaviors that affect risk of falls.   - [place] fall precautions as indicated by assessment.  - Educate pt/family on patient safety including physical limitations  - Instruct pt to call for assistance with activity based on assessment  - Modify environment to reduce risk of injury  - Provide assistive devices as appropriate  - Consider OT/PT consult to assist with strengthening/mobility  - Encourage toileting schedule  Outcome: Progressing     Problem: DISCHARGE PLANNING  Goal: Discharge to home or other facility with appropriate resources  Description: INTERVENTIONS:  - Identify barriers to discharge w/pt and caregiver  - Include patient/family/discharge partner in discharge planning  - Arrange for needed discharge resources and transportation as appropriate  - Identify discharge learning needs (meds, wound care, etc)  - Arrange for interpreters to assist at discharge as needed  - Consider post-discharge preferences of patient/family/discharge partner  - Complete POLST form as appropriate  - Assess patient's ability to be responsible for managing their own health  - Refer to Case Management Department for coordinating discharge planning if the patient needs post-hospital services based on physician/LIP order or complex needs related to functional status, cognitive ability or social support system  Outcome: Progressing     Problem: RESPIRATORY - ADULT  Goal: Achieves optimal ventilation and oxygenation  Description: INTERVENTIONS:  - Assess for changes in respiratory status  - Assess for changes in mentation and behavior  - Position to facilitate oxygenation and minimize respiratory effort  - Oxygen supplementation based on oxygen saturation or ABGs  - Provide Smoking Cessation handout, if applicable  - Encourage broncho-pulmonary hygiene including cough, deep breathe, Incentive Spirometry  - Assess the need for suctioning and perform as needed  - Assess and instruct to report SOB or any respiratory difficulty  - Respiratory Therapy support as indicated  - Manage/alleviate anxiety  - Monitor for signs/symptoms of CO2 retention  Outcome: Progressing

## 2022-12-20 NOTE — PLAN OF CARE
Problem: Patient Centered Care  Goal: Patient preferences are identified and integrated in the patient's plan of care  Description: Interventions:  - What would you like us to know as we care for you? I live at home alone  - Provide timely, complete, and accurate information to patient/family  - Incorporate patient and family knowledge, values, beliefs, and cultural backgrounds into the planning and delivery of care  - Encourage patient/family to participate in care and decision-making at the level they choose  - Honor patient and family perspectives and choices  Outcome: Progressing     Problem: Patient/Family Goals  Goal: Patient/Family Long Term Goal  Description: Patient's Long Term Goal: build up my strength    Interventions:  - PT/OT  -Covid meds  -increase ambulation in room  - See additional Care Plan goals for specific interventions  Outcome: Progressing  Goal: Patient/Family Short Term Goal  Description: Patient's Short Term Goal: feel rested    Interventions:   - minimize night time interuptions  -IV meds  -encourage ambulation with staff during day   - See additional Care Plan goals for specific interventions  Outcome: Progressing     Problem: SAFETY ADULT - FALL  Goal: Free from fall injury  Description: INTERVENTIONS:  - Assess pt frequently for physical needs  - Identify cognitive and physical deficits and behaviors that affect risk of falls.   - [place] fall precautions as indicated by assessment.  - Educate pt/family on patient safety including physical limitations  - Instruct pt to call for assistance with activity based on assessment  - Modify environment to reduce risk of injury  - Provide assistive devices as appropriate  - Consider OT/PT consult to assist with strengthening/mobility  - Encourage toileting schedule  Outcome: Progressing     Problem: DISCHARGE PLANNING  Goal: Discharge to home or other facility with appropriate resources  Description: INTERVENTIONS:  - Identify barriers to discharge w/pt and caregiver  - Include patient/family/discharge partner in discharge planning  - Arrange for needed discharge resources and transportation as appropriate  - Identify discharge learning needs (meds, wound care, etc)  - Arrange for interpreters to assist at discharge as needed  - Consider post-discharge preferences of patient/family/discharge partner  - Complete POLST form as appropriate  - Assess patient's ability to be responsible for managing their own health  - Refer to Case Management Department for coordinating discharge planning if the patient needs post-hospital services based on physician/LIP order or complex needs related to functional status, cognitive ability or social support system  Outcome: Progressing     Problem: RESPIRATORY - ADULT  Goal: Achieves optimal ventilation and oxygenation  Description: INTERVENTIONS:  - Assess for changes in respiratory status  - Assess for changes in mentation and behavior  - Position to facilitate oxygenation and minimize respiratory effort  - Oxygen supplementation based on oxygen saturation or ABGs  - Provide Smoking Cessation handout, if applicable  - Encourage broncho-pulmonary hygiene including cough, deep breathe, Incentive Spirometry  - Assess the need for suctioning and perform as needed  - Assess and instruct to report SOB or any respiratory difficulty  - Respiratory Therapy support as indicated  - Manage/alleviate anxiety  - Monitor for signs/symptoms of CO2 retention  Outcome: Progressing     No acute changes overnight, vital signs being monitored, frequent rounding by nursing staff, safety precautions in place, call light within reach

## 2022-12-20 NOTE — CM/SW NOTE
CM requested choice of [facility] from patient's sister April Grey via phone call. Chon Little and her spouse notified CM that they are still undecided but will make decision by tomorrow morning and meet w/SW or CM onsite. Plan: [facility] pending facility choice and medical clearance.     Jose Trevizo, IANN    [phone]

## 2022-12-20 NOTE — PLAN OF CARE
Patient  remains on oxygen 2L, short of breath with minimal exertion. Dry cough, denies pain. Appetite fair. Assisted up to chair by occupational therapist with one assist and walker. 2 assist needed to get back to bed, patient felt more weak, short of breath. Problem: Patient Centered Care  Goal: Patient preferences are identified and integrated in the patient's plan of care  Description: Interventions:  - What would you like us to know as we care for you? I live at home alone  - Provide timely, complete, and accurate information to patient/family  - Incorporate patient and family knowledge, values, beliefs, and cultural backgrounds into the planning and delivery of care  - Encourage patient/family to participate in care and decision-making at the level they choose  - Honor patient and family perspectives and choices  Outcome: Progressing     Problem: Patient/Family Goals  Goal: Patient/Family Long Term Goal  Description: Patient's Long Term Goal: build up my strength    Interventions:  - PT/OT  -Covid meds  -increase ambulation in room  - See additional Care Plan goals for specific interventions  Outcome: Progressing  Goal: Patient/Family Short Term Goal  Description: Patient's Short Term Goal: feel rested    Interventions:   - minimize night time interuptions  -IV meds  -encourage ambulation with staff during day   - See additional Care Plan goals for specific interventions  Outcome: Progressing     Problem: SAFETY ADULT - FALL  Goal: Free from fall injury  Description: INTERVENTIONS:  - Assess pt frequently for physical needs  - Identify cognitive and physical deficits and behaviors that affect risk of falls.   - [place] fall precautions as indicated by assessment.  - Educate pt/family on patient safety including physical limitations  - Instruct pt to call for assistance with activity based on assessment  - Modify environment to reduce risk of injury  - Provide assistive devices as appropriate  - Consider OT/PT consult to assist with strengthening/mobility  - Encourage toileting schedule  Outcome: Progressing     Problem: DISCHARGE PLANNING  Goal: Discharge to home or other facility with appropriate resources  Description: INTERVENTIONS:  - Identify barriers to discharge w/pt and caregiver  - Include patient/family/discharge partner in discharge planning  - Arrange for needed discharge resources and transportation as appropriate  - Identify discharge learning needs (meds, wound care, etc)  - Arrange for interpreters to assist at discharge as needed  - Consider post-discharge preferences of patient/family/discharge partner  - Complete POLST form as appropriate  - Assess patient's ability to be responsible for managing their own health  - Refer to Case Management Department for coordinating discharge planning if the patient needs post-hospital services based on physician/LIP order or complex needs related to functional status, cognitive ability or social support system  Outcome: Progressing     Problem: RESPIRATORY - ADULT  Goal: Achieves optimal ventilation and oxygenation  Description: INTERVENTIONS:  - Assess for changes in respiratory status  - Assess for changes in mentation and behavior  - Position to facilitate oxygenation and minimize respiratory effort  - Oxygen supplementation based on oxygen saturation or ABGs  - Provide Smoking Cessation handout, if applicable  - Encourage broncho-pulmonary hygiene including cough, deep breathe, Incentive Spirometry  - Assess the need for suctioning and perform as needed  - Assess and instruct to report SOB or any respiratory difficulty  - Respiratory Therapy support as indicated  - Manage/alleviate anxiety  - Monitor for signs/symptoms of CO2 retention  Outcome: Progressing

## 2022-12-21 LAB
ANION GAP SERPL CALC-SCNC: 1 MMOL/L (ref 0–18)
BASOPHILS # BLD AUTO: 0.01 X10(3) UL (ref 0–0.2)
BASOPHILS NFR BLD AUTO: 0.1 %
BUN BLD-MCNC: 47 MG/DL (ref 7–18)
BUN/CREAT SERPL: 33.1 (ref 10–20)
CALCIUM BLD-MCNC: 8.3 MG/DL (ref 8.5–10.1)
CHLORIDE SERPL-SCNC: 104 MMOL/L (ref 98–112)
CO2 SERPL-SCNC: 37 MMOL/L (ref 21–32)
CREAT BLD-MCNC: 1.42 MG/DL
DEPRECATED RDW RBC AUTO: 50.4 FL (ref 35.1–46.3)
EOSINOPHIL # BLD AUTO: 0 X10(3) UL (ref 0–0.7)
EOSINOPHIL NFR BLD AUTO: 0 %
ERYTHROCYTE [DISTWIDTH] IN BLOOD BY AUTOMATED COUNT: 13.3 % (ref 11–15)
GFR SERPLBLD BASED ON 1.73 SQ M-ARVRAT: 50 ML/MIN/1.73M2 (ref 60–?)
GLUCOSE BLD-MCNC: 120 MG/DL (ref 70–99)
HCT VFR BLD AUTO: 32.7 %
HGB BLD-MCNC: 10 G/DL
IMM GRANULOCYTES # BLD AUTO: 0.1 X10(3) UL (ref 0–1)
IMM GRANULOCYTES NFR BLD: 0.5 %
LYMPHOCYTES # BLD AUTO: 0.54 X10(3) UL (ref 1–4)
LYMPHOCYTES NFR BLD AUTO: 2.9 %
MCH RBC QN AUTO: 31.2 PG (ref 26–34)
MCHC RBC AUTO-ENTMCNC: 30.6 G/DL (ref 31–37)
MCV RBC AUTO: 101.9 FL
MONOCYTES # BLD AUTO: 1.44 X10(3) UL (ref 0.1–1)
MONOCYTES NFR BLD AUTO: 7.8 %
NEUTROPHILS # BLD AUTO: 16.4 X10 (3) UL (ref 1.5–7.7)
NEUTROPHILS # BLD AUTO: 16.4 X10(3) UL (ref 1.5–7.7)
NEUTROPHILS NFR BLD AUTO: 88.7 %
OSMOLALITY SERPL CALC.SUM OF ELEC: 307 MOSM/KG (ref 275–295)
PLATELET # BLD AUTO: 227 10(3)UL (ref 150–450)
POTASSIUM SERPL-SCNC: 5 MMOL/L (ref 3.5–5.1)
RBC # BLD AUTO: 3.21 X10(6)UL
SODIUM SERPL-SCNC: 142 MMOL/L (ref 136–145)
WBC # BLD AUTO: 18.5 X10(3) UL (ref 4–11)

## 2022-12-21 PROCEDURE — 99232 SBSQ HOSP IP/OBS MODERATE 35: CPT | Performed by: STUDENT IN AN ORGANIZED HEALTH CARE EDUCATION/TRAINING PROGRAM

## 2022-12-21 PROCEDURE — 99232 SBSQ HOSP IP/OBS MODERATE 35: CPT | Performed by: INTERNAL MEDICINE

## 2022-12-21 PROCEDURE — 99233 SBSQ HOSP IP/OBS HIGH 50: CPT | Performed by: HOSPITALIST

## 2022-12-21 NOTE — PLAN OF CARE
Problem: Patient Centered Care  Goal: Patient preferences are identified and integrated in the patient's plan of care  Description: Interventions:  - What would you like us to know as we care for you? I live at home alone  - Provide timely, complete, and accurate information to patient/family  - Incorporate patient and family knowledge, values, beliefs, and cultural backgrounds into the planning and delivery of care  - Encourage patient/family to participate in care and decision-making at the level they choose  - Honor patient and family perspectives and choices  Outcome: Progressing     Problem: Patient/Family Goals  Goal: Patient/Family Long Term Goal  Description: Patient's Long Term Goal: build up my strength    Interventions:  - PT/OT  -Covid meds  -increase ambulation in room  - See additional Care Plan goals for specific interventions  Outcome: Progressing  Goal: Patient/Family Short Term Goal  Description: Patient's Short Term Goal: feel rested    Interventions:   - minimize night time interuptions  -IV meds  -encourage ambulation with staff during day   - See additional Care Plan goals for specific interventions  Outcome: Progressing     Problem: SAFETY ADULT - FALL  Goal: Free from fall injury  Description: INTERVENTIONS:  - Assess pt frequently for physical needs  - Identify cognitive and physical deficits and behaviors that affect risk of falls.   - [place] fall precautions as indicated by assessment.  - Educate pt/family on patient safety including physical limitations  - Instruct pt to call for assistance with activity based on assessment  - Modify environment to reduce risk of injury  - Provide assistive devices as appropriate  - Consider OT/PT consult to assist with strengthening/mobility  - Encourage toileting schedule  Outcome: Progressing     Problem: DISCHARGE PLANNING  Goal: Discharge to home or other facility with appropriate resources  Description: INTERVENTIONS:  - Identify barriers to discharge w/pt and caregiver  - Include patient/family/discharge partner in discharge planning  - Arrange for needed discharge resources and transportation as appropriate  - Identify discharge learning needs (meds, wound care, etc)  - Arrange for interpreters to assist at discharge as needed  - Consider post-discharge preferences of patient/family/discharge partner  - Complete POLST form as appropriate  - Assess patient's ability to be responsible for managing their own health  - Refer to Case Management Department for coordinating discharge planning if the patient needs post-hospital services based on physician/LIP order or complex needs related to functional status, cognitive ability or social support system  Outcome: Progressing     Problem: RESPIRATORY - ADULT  Goal: Achieves optimal ventilation and oxygenation  Description: INTERVENTIONS:  - Assess for changes in respiratory status  - Assess for changes in mentation and behavior  - Position to facilitate oxygenation and minimize respiratory effort  - Oxygen supplementation based on oxygen saturation or ABGs  - Provide Smoking Cessation handout, if applicable  - Encourage broncho-pulmonary hygiene including cough, deep breathe, Incentive Spirometry  - Assess the need for suctioning and perform as needed  - Assess and instruct to report SOB or any respiratory difficulty  - Respiratory Therapy support as indicated  - Manage/alleviate anxiety  - Monitor for signs/symptoms of CO2 retention  Outcome: Progressing    No acute changes noted at this time. Safety and fall precautions maintained- bed alarm on, bed locked in lowest position, call light within reach. Frequent rounding by nursing staff.

## 2022-12-21 NOTE — CM/SW NOTE
Per sister/pt the [facility] choice is [facility]. CM explained [facility] process w/transportation. Sister/pt looking also into resources for home placement. CM informed of her of [facility] w/contact ph#. Also, agreed for email referral to be sent to [facility] to start resource process. CM reserved facility in aidin. PLAN; [facility] when medically cleared. CM/SW to remain available for support and/or discharge planning.     Sharad Magallanes RN, [facility]    Ext.  [phone]

## 2022-12-22 LAB
ALBUMIN SERPL-MCNC: 2.4 G/DL (ref 3.4–5)
ANION GAP SERPL CALC-SCNC: 1 MMOL/L (ref 0–18)
BASOPHILS # BLD AUTO: 0.02 X10(3) UL (ref 0–0.2)
BASOPHILS NFR BLD AUTO: 0.1 %
BUN BLD-MCNC: 42 MG/DL (ref 7–18)
BUN/CREAT SERPL: 31.8 (ref 10–20)
CALCIUM BLD-MCNC: 8.4 MG/DL (ref 8.5–10.1)
CHLORIDE SERPL-SCNC: 104 MMOL/L (ref 98–112)
CO2 SERPL-SCNC: 36 MMOL/L (ref 21–32)
CREAT BLD-MCNC: 1.32 MG/DL
DEPRECATED RDW RBC AUTO: 49.3 FL (ref 35.1–46.3)
EOSINOPHIL # BLD AUTO: 0.01 X10(3) UL (ref 0–0.7)
EOSINOPHIL NFR BLD AUTO: 0.1 %
ERYTHROCYTE [DISTWIDTH] IN BLOOD BY AUTOMATED COUNT: 13.3 % (ref 11–15)
GFR SERPLBLD BASED ON 1.73 SQ M-ARVRAT: 54 ML/MIN/1.73M2 (ref 60–?)
GLUCOSE BLD-MCNC: 97 MG/DL (ref 70–99)
HCT VFR BLD AUTO: 32.4 %
HGB BLD-MCNC: 10.3 G/DL
IMM GRANULOCYTES # BLD AUTO: 0.1 X10(3) UL (ref 0–1)
IMM GRANULOCYTES NFR BLD: 0.5 %
LYMPHOCYTES # BLD AUTO: 0.85 X10(3) UL (ref 1–4)
LYMPHOCYTES NFR BLD AUTO: 4.5 %
MAGNESIUM SERPL-MCNC: 1.4 MG/DL (ref 1.6–2.6)
MCH RBC QN AUTO: 32 PG (ref 26–34)
MCHC RBC AUTO-ENTMCNC: 31.8 G/DL (ref 31–37)
MCV RBC AUTO: 100.6 FL
MONOCYTES # BLD AUTO: 1.51 X10(3) UL (ref 0.1–1)
MONOCYTES NFR BLD AUTO: 8 %
NEUTROPHILS # BLD AUTO: 16.31 X10 (3) UL (ref 1.5–7.7)
NEUTROPHILS # BLD AUTO: 16.31 X10(3) UL (ref 1.5–7.7)
NEUTROPHILS NFR BLD AUTO: 86.8 %
OSMOLALITY SERPL CALC.SUM OF ELEC: 302 MOSM/KG (ref 275–295)
PHOSPHATE SERPL-MCNC: 2.7 MG/DL (ref 2.5–4.9)
PLATELET # BLD AUTO: 202 10(3)UL (ref 150–450)
POTASSIUM SERPL-SCNC: 4.3 MMOL/L (ref 3.5–5.1)
RBC # BLD AUTO: 3.22 X10(6)UL
SODIUM SERPL-SCNC: 141 MMOL/L (ref 136–145)
WBC # BLD AUTO: 18.8 X10(3) UL (ref 4–11)

## 2022-12-22 PROCEDURE — 99233 SBSQ HOSP IP/OBS HIGH 50: CPT | Performed by: HOSPITALIST

## 2022-12-22 PROCEDURE — 99232 SBSQ HOSP IP/OBS MODERATE 35: CPT | Performed by: PHYSICIAN ASSISTANT

## 2022-12-22 RX ORDER — MAGNESIUM OXIDE 400 MG/1
800 TABLET ORAL ONCE
Status: COMPLETED | OUTPATIENT
Start: 2022-12-22 | End: 2022-12-22

## 2022-12-22 RX ORDER — PREDNISONE 10 MG/1
TABLET ORAL
Qty: 15 TABLET | Refills: 0 | Status: SHIPPED | OUTPATIENT
Start: 2022-12-23 | End: 2022-12-31

## 2022-12-22 NOTE — PLAN OF CARE
No acute changes overnight. Pt slept comfortably. Problem: Patient Centered Care  Goal: Patient preferences are identified and integrated in the patient's plan of care  Description: Interventions:  - What would you like us to know as we care for you? I live at home alone  - Provide timely, complete, and accurate information to patient/family  - Incorporate patient and family knowledge, values, beliefs, and cultural backgrounds into the planning and delivery of care  - Encourage patient/family to participate in care and decision-making at the level they choose  - Honor patient and family perspectives and choices  Outcome: Progressing     Problem: Patient/Family Goals  Goal: Patient/Family Long Term Goal  Description: Patient's Long Term Goal: build up my strength    Interventions:  - PT/OT  -Covid meds  -increase ambulation in room  - See additional Care Plan goals for specific interventions  Outcome: Progressing  Goal: Patient/Family Short Term Goal  Description: Patient's Short Term Goal: feel rested    Interventions:   - minimize night time interuptions  -IV meds  -encourage ambulation with staff during day   - See additional Care Plan goals for specific interventions  Outcome: Progressing     Problem: SAFETY ADULT - FALL  Goal: Free from fall injury  Description: INTERVENTIONS:  - Assess pt frequently for physical needs  - Identify cognitive and physical deficits and behaviors that affect risk of falls.   - [place] fall precautions as indicated by assessment.  - Educate pt/family on patient safety including physical limitations  - Instruct pt to call for assistance with activity based on assessment  - Modify environment to reduce risk of injury  - Provide assistive devices as appropriate  - Consider OT/PT consult to assist with strengthening/mobility  - Encourage toileting schedule  Outcome: Progressing     Problem: DISCHARGE PLANNING  Goal: Discharge to home or other facility with appropriate resources  Description: INTERVENTIONS:  - Identify barriers to discharge w/pt and caregiver  - Include patient/family/discharge partner in discharge planning  - Arrange for needed discharge resources and transportation as appropriate  - Identify discharge learning needs (meds, wound care, etc)  - Arrange for interpreters to assist at discharge as needed  - Consider post-discharge preferences of patient/family/discharge partner  - Complete POLST form as appropriate  - Assess patient's ability to be responsible for managing their own health  - Refer to Case Management Department for coordinating discharge planning if the patient needs post-hospital services based on physician/LIP order or complex needs related to functional status, cognitive ability or social support system  Outcome: Progressing     Problem: RESPIRATORY - ADULT  Goal: Achieves optimal ventilation and oxygenation  Description: INTERVENTIONS:  - Assess for changes in respiratory status  - Assess for changes in mentation and behavior  - Position to facilitate oxygenation and minimize respiratory effort  - Oxygen supplementation based on oxygen saturation or ABGs  - Provide Smoking Cessation handout, if applicable  - Encourage broncho-pulmonary hygiene including cough, deep breathe, Incentive Spirometry  - Assess the need for suctioning and perform as needed  - Assess and instruct to report SOB or any respiratory difficulty  - Respiratory Therapy support as indicated  - Manage/alleviate anxiety  - Monitor for signs/symptoms of CO2 retention  Outcome: Progressing

## 2022-12-22 NOTE — PLAN OF CARE
No acute changes. Pt up to chair with staff assist. Pt desats with transfer and requires 5L when moving, recovers within a minute. Bowel regimen started. Family at bedside updated on plan of care. Problem: Patient Centered Care  Goal: Patient preferences are identified and integrated in the patient's plan of care  Description: Interventions:  - What would you like us to know as we care for you? I live at home alone  - Provide timely, complete, and accurate information to patient/family  - Incorporate patient and family knowledge, values, beliefs, and cultural backgrounds into the planning and delivery of care  - Encourage patient/family to participate in care and decision-making at the level they choose  - Honor patient and family perspectives and choices  Outcome: Progressing     Problem: Patient/Family Goals  Goal: Patient/Family Long Term Goal  Description: Patient's Long Term Goal: build up my strength    Interventions:  - PT/OT  -Covid meds  -increase ambulation in room  - See additional Care Plan goals for specific interventions  Outcome: Progressing  Goal: Patient/Family Short Term Goal  Description: Patient's Short Term Goal: feel rested    Interventions:   - minimize night time interuptions  -IV meds  -encourage ambulation with staff during day   - See additional Care Plan goals for specific interventions  Outcome: Progressing     Problem: SAFETY ADULT - FALL  Goal: Free from fall injury  Description: INTERVENTIONS:  - Assess pt frequently for physical needs  - Identify cognitive and physical deficits and behaviors that affect risk of falls.   - [place] fall precautions as indicated by assessment.  - Educate pt/family on patient safety including physical limitations  - Instruct pt to call for assistance with activity based on assessment  - Modify environment to reduce risk of injury  - Provide assistive devices as appropriate  - Consider OT/PT consult to assist with strengthening/mobility  - Encourage toileting schedule  Outcome: Progressing     Problem: DISCHARGE PLANNING  Goal: Discharge to home or other facility with appropriate resources  Description: INTERVENTIONS:  - Identify barriers to discharge w/pt and caregiver  - Include patient/family/discharge partner in discharge planning  - Arrange for needed discharge resources and transportation as appropriate  - Identify discharge learning needs (meds, wound care, etc)  - Arrange for interpreters to assist at discharge as needed  - Consider post-discharge preferences of patient/family/discharge partner  - Complete POLST form as appropriate  - Assess patient's ability to be responsible for managing their own health  - Refer to Case Management Department for coordinating discharge planning if the patient needs post-hospital services based on physician/LIP order or complex needs related to functional status, cognitive ability or social support system  Outcome: Progressing     Problem: RESPIRATORY - ADULT  Goal: Achieves optimal ventilation and oxygenation  Description: INTERVENTIONS:  - Assess for changes in respiratory status  - Assess for changes in mentation and behavior  - Position to facilitate oxygenation and minimize respiratory effort  - Oxygen supplementation based on oxygen saturation or ABGs  - Provide Smoking Cessation handout, if applicable  - Encourage broncho-pulmonary hygiene including cough, deep breathe, Incentive Spirometry  - Assess the need for suctioning and perform as needed  - Assess and instruct to report SOB or any respiratory difficulty  - Respiratory Therapy support as indicated  - Manage/alleviate anxiety  - Monitor for signs/symptoms of CO2 retention  Outcome: Progressing

## 2022-12-22 NOTE — CM/SW NOTE
Pt will be held one more day per MD. Domnick Sicard at [facility] notified of probable dc tomorrow. [facility] placed on will call for Friday, PCS updated. Plan: [facility] for [facility] pending medical clearance.     Keisha Tyler, IANN    [phone]

## 2022-12-22 NOTE — PLAN OF CARE
Problem: Patient Centered Care  Goal: Patient preferences are identified and integrated in the patient's plan of care  Description: Interventions:  - What would you like us to know as we care for you? I live at home alone  - Provide timely, complete, and accurate information to patient/family  - Incorporate patient and family knowledge, values, beliefs, and cultural backgrounds into the planning and delivery of care  - Encourage patient/family to participate in care and decision-making at the level they choose  - Honor patient and family perspectives and choices  Outcome: Progressing     Problem: Patient/Family Goals  Goal: Patient/Family Long Term Goal  Description: Patient's Long Term Goal: build up my strength    Interventions:  - PT/OT  -Covid meds  -increase ambulation in room  - See additional Care Plan goals for specific interventions  Outcome: Progressing  Goal: Patient/Family Short Term Goal  Description: Patient's Short Term Goal: feel rested    Interventions:   - minimize night time interuptions  -IV meds  -encourage ambulation with staff during day   - See additional Care Plan goals for specific interventions  Outcome: Progressing     Problem: SAFETY ADULT - FALL  Goal: Free from fall injury  Description: INTERVENTIONS:  - Assess pt frequently for physical needs  - Identify cognitive and physical deficits and behaviors that affect risk of falls.   - [place] fall precautions as indicated by assessment.  - Educate pt/family on patient safety including physical limitations  - Instruct pt to call for assistance with activity based on assessment  - Modify environment to reduce risk of injury  - Provide assistive devices as appropriate  - Consider OT/PT consult to assist with strengthening/mobility  - Encourage toileting schedule  Outcome: Progressing     Problem: DISCHARGE PLANNING  Goal: Discharge to home or other facility with appropriate resources  Description: INTERVENTIONS:  - Identify barriers to discharge w/pt and caregiver  - Include patient/family/discharge partner in discharge planning  - Arrange for needed discharge resources and transportation as appropriate  - Identify discharge learning needs (meds, wound care, etc)  - Arrange for interpreters to assist at discharge as needed  - Consider post-discharge preferences of patient/family/discharge partner  - Complete POLST form as appropriate  - Assess patient's ability to be responsible for managing their own health  - Refer to Case Management Department for coordinating discharge planning if the patient needs post-hospital services based on physician/LIP order or complex needs related to functional status, cognitive ability or social support system  Outcome: Progressing     Problem: RESPIRATORY - ADULT  Goal: Achieves optimal ventilation and oxygenation  Description: INTERVENTIONS:  - Assess for changes in respiratory status  - Assess for changes in mentation and behavior  - Position to facilitate oxygenation and minimize respiratory effort  - Oxygen supplementation based on oxygen saturation or ABGs  - Provide Smoking Cessation handout, if applicable  - Encourage broncho-pulmonary hygiene including cough, deep breathe, Incentive Spirometry  - Assess the need for suctioning and perform as needed  - Assess and instruct to report SOB or any respiratory difficulty  - Respiratory Therapy support as indicated  - Manage/alleviate anxiety  - Monitor for signs/symptoms of CO2 retention  Outcome: Progressing

## 2022-12-23 VITALS
RESPIRATION RATE: 20 BRPM | HEIGHT: 74 IN | DIASTOLIC BLOOD PRESSURE: 69 MMHG | WEIGHT: 214 LBS | HEART RATE: 72 BPM | OXYGEN SATURATION: 97 % | TEMPERATURE: 97 F | SYSTOLIC BLOOD PRESSURE: 137 MMHG | BODY MASS INDEX: 27.46 KG/M2

## 2022-12-23 LAB
ANION GAP SERPL CALC-SCNC: 1 MMOL/L (ref 0–18)
BASOPHILS # BLD AUTO: 0.02 X10(3) UL (ref 0–0.2)
BASOPHILS NFR BLD AUTO: 0.1 %
BUN BLD-MCNC: 38 MG/DL (ref 7–18)
BUN/CREAT SERPL: 32.2 (ref 10–20)
CALCIUM BLD-MCNC: 8.5 MG/DL (ref 8.5–10.1)
CHLORIDE SERPL-SCNC: 104 MMOL/L (ref 98–112)
CO2 SERPL-SCNC: 38 MMOL/L (ref 21–32)
CREAT BLD-MCNC: 1.18 MG/DL
DEPRECATED RDW RBC AUTO: 49.8 FL (ref 35.1–46.3)
EOSINOPHIL # BLD AUTO: 0.03 X10(3) UL (ref 0–0.7)
EOSINOPHIL NFR BLD AUTO: 0.2 %
ERYTHROCYTE [DISTWIDTH] IN BLOOD BY AUTOMATED COUNT: 13.4 % (ref 11–15)
GFR SERPLBLD BASED ON 1.73 SQ M-ARVRAT: 62 ML/MIN/1.73M2 (ref 60–?)
GLUCOSE BLD-MCNC: 95 MG/DL (ref 70–99)
HCT VFR BLD AUTO: 32.4 %
HGB BLD-MCNC: 10.1 G/DL
IMM GRANULOCYTES # BLD AUTO: 0.1 X10(3) UL (ref 0–1)
IMM GRANULOCYTES NFR BLD: 0.6 %
LYMPHOCYTES # BLD AUTO: 0.82 X10(3) UL (ref 1–4)
LYMPHOCYTES NFR BLD AUTO: 5.1 %
MAGNESIUM SERPL-MCNC: 1.5 MG/DL (ref 1.6–2.6)
MCH RBC QN AUTO: 31.5 PG (ref 26–34)
MCHC RBC AUTO-ENTMCNC: 31.2 G/DL (ref 31–37)
MCV RBC AUTO: 100.9 FL
MONOCYTES # BLD AUTO: 1.26 X10(3) UL (ref 0.1–1)
MONOCYTES NFR BLD AUTO: 7.8 %
NEUTROPHILS # BLD AUTO: 13.87 X10 (3) UL (ref 1.5–7.7)
NEUTROPHILS # BLD AUTO: 13.87 X10(3) UL (ref 1.5–7.7)
NEUTROPHILS NFR BLD AUTO: 86.2 %
OSMOLALITY SERPL CALC.SUM OF ELEC: 305 MOSM/KG (ref 275–295)
PHOSPHATE SERPL-MCNC: 2.1 MG/DL (ref 2.5–4.9)
PLATELET # BLD AUTO: 179 10(3)UL (ref 150–450)
POTASSIUM SERPL-SCNC: 4.4 MMOL/L (ref 3.5–5.1)
RBC # BLD AUTO: 3.21 X10(6)UL
SODIUM SERPL-SCNC: 143 MMOL/L (ref 136–145)
WBC # BLD AUTO: 16.1 X10(3) UL (ref 4–11)

## 2022-12-23 PROCEDURE — 99232 SBSQ HOSP IP/OBS MODERATE 35: CPT | Performed by: INTERNAL MEDICINE

## 2022-12-23 PROCEDURE — 99231 SBSQ HOSP IP/OBS SF/LOW 25: CPT | Performed by: STUDENT IN AN ORGANIZED HEALTH CARE EDUCATION/TRAINING PROGRAM

## 2022-12-23 PROCEDURE — 99239 HOSP IP/OBS DSCHRG MGMT >30: CPT | Performed by: HOSPITALIST

## 2022-12-23 RX ORDER — MAGNESIUM OXIDE 400 MG (241.3 MG MAGNESIUM) TABLET
1 TABLET NIGHTLY
Qty: 30 TABLET | Refills: 0 | Status: SHIPPED | OUTPATIENT
Start: 2022-12-23

## 2022-12-23 RX ORDER — MAGNESIUM OXIDE 400 MG/1
800 TABLET ORAL ONCE
Status: COMPLETED | OUTPATIENT
Start: 2022-12-23 | End: 2022-12-23

## 2022-12-23 RX ORDER — ACETAMINOPHEN 500 MG
500 TABLET ORAL EVERY 6 HOURS PRN
Qty: 1 TABLET | Refills: 0 | Status: SHIPPED | COMMUNITY
Start: 2022-12-23

## 2022-12-23 RX ORDER — BENZONATATE 200 MG/1
200 CAPSULE ORAL 3 TIMES DAILY PRN
Qty: 30 CAPSULE | Refills: 0 | Status: SHIPPED | OUTPATIENT
Start: 2022-12-23

## 2022-12-23 RX ORDER — LACTULOSE 20 G/30ML
20 SOLUTION ORAL EVERY 6 HOURS PRN
Qty: 300 ML | Refills: 0 | Status: SHIPPED | OUTPATIENT
Start: 2022-12-23

## 2022-12-23 RX ORDER — BISACODYL 10 MG
10 SUPPOSITORY, RECTAL RECTAL
Qty: 20 SUPPOSITORY | Refills: 0 | Status: SHIPPED | OUTPATIENT
Start: 2022-12-23

## 2022-12-23 RX ORDER — POLYETHYLENE GLYCOL 3350 17 G/17G
17 POWDER, FOR SOLUTION ORAL DAILY PRN
Qty: 30 EACH | Refills: 0 | Status: SHIPPED | OUTPATIENT
Start: 2022-12-23

## 2022-12-23 RX ORDER — PSEUDOEPHEDRINE HCL 30 MG
100 TABLET ORAL 2 TIMES DAILY PRN
Qty: 20 CAPSULE | Refills: 0 | Status: SHIPPED | OUTPATIENT
Start: 2022-12-23

## 2022-12-23 NOTE — CM/SW NOTE
12/22/22 0933   Discharge disposition   Expected discharge disposition [facility] Provider   ([facility] and [facility])   Discharge transportation [facility]     Pt discussed during nursing rounds. Pt is stable for dc today. MD dc order entered. Pt will dc to [address] [place] [address] and Rehab in [facility] for Jaun Flores in admissions confirmed bed is available today. Pt and sister are agreeable to transfer today. [facility] scheduled for 7pm . Number for nurse report is [phone]. Plan: Pancho Cox for [facility] today. / to remain available for support and/or discharge planning.      ELLA Hahn    [phone]

## 2022-12-23 NOTE — PLAN OF CARE
Problem: Patient Centered Care  Goal: Patient preferences are identified and integrated in the patient's plan of care  Description: Interventions:  - What would you like us to know as we care for you? I live at home alone  - Provide timely, complete, and accurate information to patient/family  - Incorporate patient and family knowledge, values, beliefs, and cultural backgrounds into the planning and delivery of care  - Encourage patient/family to participate in care and decision-making at the level they choose  - Honor patient and family perspectives and choices  Outcome: Completed     Problem: Patient/Family Goals  Goal: Patient/Family Long Term Goal  Description: Patient's Long Term Goal: build up my strength    Interventions:  - PT/OT  -Covid meds  -increase ambulation in room  - See additional Care Plan goals for specific interventions  Outcome: Completed  Goal: Patient/Family Short Term Goal  Description: Patient's Short Term Goal: feel rested    Interventions:   - minimize night time interuptions  -IV meds  -encourage ambulation with staff during day   - See additional Care Plan goals for specific interventions  Outcome: Completed     Problem: SAFETY ADULT - FALL  Goal: Free from fall injury  Description: INTERVENTIONS:  - Assess pt frequently for physical needs  - Identify cognitive and physical deficits and behaviors that affect risk of falls.   - [place] fall precautions as indicated by assessment.  - Educate pt/family on patient safety including physical limitations  - Instruct pt to call for assistance with activity based on assessment  - Modify environment to reduce risk of injury  - Provide assistive devices as appropriate  - Consider OT/PT consult to assist with strengthening/mobility  - Encourage toileting schedule  Outcome: Completed     Problem: DISCHARGE PLANNING  Goal: Discharge to home or other facility with appropriate resources  Description: INTERVENTIONS:  - Identify barriers to discharge w/pt and caregiver  - Include patient/family/discharge partner in discharge planning  - Arrange for needed discharge resources and transportation as appropriate  - Identify discharge learning needs (meds, wound care, etc)  - Arrange for interpreters to assist at discharge as needed  - Consider post-discharge preferences of patient/family/discharge partner  - Complete POLST form as appropriate  - Assess patient's ability to be responsible for managing their own health  - Refer to Case Management Department for coordinating discharge planning if the patient needs post-hospital services based on physician/LIP order or complex needs related to functional status, cognitive ability or social support system  Outcome: Completed     Problem: RESPIRATORY - ADULT  Goal: Achieves optimal ventilation and oxygenation  Description: INTERVENTIONS:  - Assess for changes in respiratory status  - Assess for changes in mentation and behavior  - Position to facilitate oxygenation and minimize respiratory effort  - Oxygen supplementation based on oxygen saturation or ABGs  - Provide Smoking Cessation handout, if applicable  - Encourage broncho-pulmonary hygiene including cough, deep breathe, Incentive Spirometry  - Assess the need for suctioning and perform as needed  - Assess and instruct to report SOB or any respiratory difficulty  - Respiratory Therapy support as indicated  - Manage/alleviate anxiety  - Monitor for signs/symptoms of CO2 retention  Outcome: Completed     Vital signs stable at this time on 2 L nasal cannula at rest (patient is on 2 L oxygen at home baseline). Patient denies pain at this time, c/o mild abdominal cramping related at constipation. PRN stool softeners given per MAR. Per MD, patient cleared for discharge today. PIV removed. Report given and care endorsed to [facility], RN at [facility] [employer]. Patient discharged to [facility] [employer] [facility] [employer], transport via [facility] [employer].

## 2022-12-23 NOTE — DISCHARGE PLANNING
MDO for discharge today. Patient chart reviewed for discharge: Medication Reconciliation completed, Specialist/PCP follow up listed, and disease specific Instructions/Education included in After Visit Summary. Discharge RN notified patient's RN of AVS completion and verified all consultants have signed off. Patient's RN to notify DC RN if discharge status changes.       Dana Alarcon RN, Discharge Leader

## 2022-12-23 NOTE — CDS QUERY
How to answer this Query:  1.) Click \"Edit button\" on the toolbar.  2.) Type an \"X\" in the bracket for the diagnosis that applies. (You may also add additional clinical details as you feel necessary to substantiate your response). 3.) Finally click \"Sign\" to complete response. Thank You        Conflicting documentation    Please choose the clarification below as appropriate:    1. The attending physician is required to clarify conflicting documentation in the medical record. The following documentation is noted in the medical record: COVID PNEUMONIA    ( ) COVID WITHOUT PNEUMONIA    ( ) COVID WITH PNEUMONIA    ( ) OTHER    ___________________________________________________________________________________________________    CONFLICTING [facility]-8584 DR BUSTOS= COPD exacerbation secondary to COVID-19 pneumonia. 1222 DR HICKMAN=covid   - cont remdesivir and steroids - finished remdesivir     1213 CXR=No well-defined acute pneumonia. If you have any questions, please contact Clinical :  Wero Hung RN at [phone]     Thank You!     THIS FORM IS A PERMANENT PART OF THE MEDICAL RECORD

## 2022-12-23 NOTE — DISCHARGE SUMMARY
Dc summary#  > 30 min spent on [address] Discharge Diagnoses: acute on chronic resp failure    Lace+ Score: 77  59-90 High Risk  29-58 Medium Risk  0-28   Low Risk. TCM Follow-Up Recommendation:  LACE > 58:  High Risk of readmission after discharge from the hospital.tcm fu recommended

## 2022-12-23 NOTE — PLAN OF CARE
No acute changes overnight. [facility] when medically cleared. Problem: Patient Centered Care  Goal: Patient preferences are identified and integrated in the patient's plan of care  Description: Interventions:  - What would you like us to know as we care for you? I live at home alone  - Provide timely, complete, and accurate information to patient/family  - Incorporate patient and family knowledge, values, beliefs, and cultural backgrounds into the planning and delivery of care  - Encourage patient/family to participate in care and decision-making at the level they choose  - Honor patient and family perspectives and choices  Outcome: Progressing     Problem: Patient/Family Goals  Goal: Patient/Family Long Term Goal  Description: Patient's Long Term Goal: build up my strength    Interventions:  - PT/OT  -Covid meds  -increase ambulation in room  - See additional Care Plan goals for specific interventions  Outcome: Progressing  Goal: Patient/Family Short Term Goal  Description: Patient's Short Term Goal: feel rested    Interventions:   - minimize night time interuptions  -IV meds  -encourage ambulation with staff during day   - See additional Care Plan goals for specific interventions  Outcome: Progressing     Problem: SAFETY ADULT - FALL  Goal: Free from fall injury  Description: INTERVENTIONS:  - Assess pt frequently for physical needs  - Identify cognitive and physical deficits and behaviors that affect risk of falls.   - [place] fall precautions as indicated by assessment.  - Educate pt/family on patient safety including physical limitations  - Instruct pt to call for assistance with activity based on assessment  - Modify environment to reduce risk of injury  - Provide assistive devices as appropriate  - Consider OT/PT consult to assist with strengthening/mobility  - Encourage toileting schedule  Outcome: Progressing     Problem: DISCHARGE PLANNING  Goal: Discharge to home or other facility with appropriate resources  Description: INTERVENTIONS:  - Identify barriers to discharge w/pt and caregiver  - Include patient/family/discharge partner in discharge planning  - Arrange for needed discharge resources and transportation as appropriate  - Identify discharge learning needs (meds, wound care, etc)  - Arrange for interpreters to assist at discharge as needed  - Consider post-discharge preferences of patient/family/discharge partner  - Complete POLST form as appropriate  - Assess patient's ability to be responsible for managing their own health  - Refer to Case Management Department for coordinating discharge planning if the patient needs post-hospital services based on physician/LIP order or complex needs related to functional status, cognitive ability or social support system  Outcome: Progressing     Problem: RESPIRATORY - ADULT  Goal: Achieves optimal ventilation and oxygenation  Description: INTERVENTIONS:  - Assess for changes in respiratory status  - Assess for changes in mentation and behavior  - Position to facilitate oxygenation and minimize respiratory effort  - Oxygen supplementation based on oxygen saturation or ABGs  - Provide Smoking Cessation handout, if applicable  - Encourage broncho-pulmonary hygiene including cough, deep breathe, Incentive Spirometry  - Assess the need for suctioning and perform as needed  - Assess and instruct to report SOB or any respiratory difficulty  - Respiratory Therapy support as indicated  - Manage/alleviate anxiety  - Monitor for signs/symptoms of CO2 retention  Outcome: Progressing

## 2022-12-23 NOTE — CDS QUERY
How to answer this Query:  1.) Click \"Edit button\" on the toolbar.  2.) Type an \"X\" in the bracket for the diagnosis that applies. (You may also add additional clinical details as you feel necessary to substantiate your response). 3.) Finally click \"Sign\" to complete response. Thank You        CLINICAL VALIDATION CLARIFICATION    Please provide additional documentation in the patient's medical record supportive of your documented diagnosis of ACUTE ON CHRONIC RESPIRATORY FAILURE.       ( )  Condition was evident because add clinical indicators: ___________________________                    ( )  Condition was ruled out         ( )  Clinically unable to provide additional clarity regarding the diagnosis       ( )  Other (please specify)___________________________       ( )  Unknown  ___________________________________________________________________________________________________    Hospital Discharge Diagnoses: acute on chronic resp failure    Pt on 2-3L nc=chronically and while in hospital, sats wnl    If you have any questions, please contact Clinical :  Rita Bay RN at [phone]     Thank You!     THIS FORM IS A PERMANENT PART OF THE MEDICAL RECORD

## 2022-12-27 ENCOUNTER — APPOINTMENT (OUTPATIENT)
Dept: RADIATION ONCOLOGY | Facility: HOSPITAL | Age: 82
End: 2022-12-27
Attending: RADIOLOGY
Payer: MEDICARE

## 2022-12-29 ENCOUNTER — APPOINTMENT (OUTPATIENT)
Dept: RADIATION ONCOLOGY | Facility: HOSPITAL | Age: 82
End: 2022-12-29
Attending: INTERNAL MEDICINE
Payer: MEDICARE

## 2023-01-01 ENCOUNTER — APPOINTMENT (OUTPATIENT)
Dept: GENERAL RADIOLOGY | Facility: HOSPITAL | Age: 83
End: 2023-01-01
Attending: INTERNAL MEDICINE
Payer: MEDICARE

## 2023-01-01 ENCOUNTER — APPOINTMENT (OUTPATIENT)
Dept: GENERAL RADIOLOGY | Facility: HOSPITAL | Age: 83
End: 2023-01-01
Attending: EMERGENCY MEDICINE
Payer: MEDICARE

## 2023-01-01 ENCOUNTER — APPOINTMENT (OUTPATIENT)
Dept: CV DIAGNOSTICS | Facility: HOSPITAL | Age: 83
End: 2023-01-01
Attending: EMERGENCY MEDICINE
Payer: MEDICARE

## 2023-01-01 ENCOUNTER — APPOINTMENT (OUTPATIENT)
Dept: HEMATOLOGY/ONCOLOGY | Facility: HOSPITAL | Age: 83
End: 2023-01-01
Attending: STUDENT IN AN ORGANIZED HEALTH CARE EDUCATION/TRAINING PROGRAM
Payer: MEDICARE

## 2023-01-01 ENCOUNTER — APPOINTMENT (OUTPATIENT)
Dept: GENERAL RADIOLOGY | Facility: HOSPITAL | Age: 83
DRG: 208 | End: 2023-01-01
Attending: EMERGENCY MEDICINE
Payer: MEDICARE

## 2023-01-01 ENCOUNTER — HOSPITAL ENCOUNTER (INPATIENT)
Facility: HOSPITAL | Age: 83
LOS: 2 days | Discharge: HOSPICE | DRG: 208 | End: 2023-01-01
Attending: EMERGENCY MEDICINE | Admitting: INTERNAL MEDICINE
Payer: MEDICARE

## 2023-01-01 ENCOUNTER — HOSPITAL ENCOUNTER (INPATIENT)
Facility: HOSPITAL | Age: 83
LOS: 2 days | Discharge: HOSPICE | End: 2023-01-01
Attending: EMERGENCY MEDICINE | Admitting: INTERNAL MEDICINE
Payer: MEDICARE

## 2023-01-01 ENCOUNTER — APPOINTMENT (OUTPATIENT)
Dept: CV DIAGNOSTICS | Facility: HOSPITAL | Age: 83
DRG: 208 | End: 2023-01-01
Attending: EMERGENCY MEDICINE
Payer: MEDICARE

## 2023-01-01 ENCOUNTER — HOSPITAL ENCOUNTER (INPATIENT)
Facility: HOSPITAL | Age: 83
LOS: 2 days | End: 2023-01-01
Attending: HOSPITALIST | Admitting: HOSPITALIST
Payer: OTHER MISCELLANEOUS

## 2023-01-01 ENCOUNTER — APPOINTMENT (OUTPATIENT)
Dept: GENERAL RADIOLOGY | Facility: HOSPITAL | Age: 83
DRG: 208 | End: 2023-01-01
Attending: INTERNAL MEDICINE
Payer: MEDICARE

## 2023-01-01 VITALS
RESPIRATION RATE: 18 BRPM | TEMPERATURE: 98 F | HEART RATE: 70 BPM | DIASTOLIC BLOOD PRESSURE: 39 MMHG | SYSTOLIC BLOOD PRESSURE: 80 MMHG | OXYGEN SATURATION: 100 %

## 2023-01-01 VITALS
WEIGHT: 227.06 LBS | DIASTOLIC BLOOD PRESSURE: 59 MMHG | OXYGEN SATURATION: 99 % | RESPIRATION RATE: 24 BRPM | SYSTOLIC BLOOD PRESSURE: 116 MMHG | BODY MASS INDEX: 30 KG/M2 | HEART RATE: 70 BPM | TEMPERATURE: 97 F

## 2023-01-01 DIAGNOSIS — I46.9 CARDIAC ARREST (HCC): Primary | ICD-10-CM

## 2023-01-01 LAB
ALBUMIN SERPL-MCNC: 2.9 G/DL (ref 3.4–5)
ALP LIVER SERPL-CCNC: 106 U/L
ALT SERPL-CCNC: 32 U/L
ANION GAP SERPL CALC-SCNC: 3 MMOL/L (ref 0–18)
ANION GAP SERPL CALC-SCNC: 5 MMOL/L (ref 0–18)
ANION GAP SERPL CALC-SCNC: 5 MMOL/L (ref 0–18)
AST SERPL-CCNC: 28 U/L (ref 15–37)
ATRIAL RATE: 40 BPM
ATRIAL RATE: 71 BPM
BASE EXCESS BLD CALC-SCNC: 4.1 MMOL/L (ref ?–2)
BASOPHILS # BLD AUTO: 0.01 X10(3) UL (ref 0–0.2)
BASOPHILS # BLD: 0.25 X10(3) UL (ref 0–0.2)
BASOPHILS NFR BLD AUTO: 0.1 %
BASOPHILS NFR BLD: 2 %
BILIRUB DIRECT SERPL-MCNC: 0.2 MG/DL (ref 0–0.2)
BILIRUB SERPL-MCNC: 0.5 MG/DL (ref 0.1–2)
BUN BLD-MCNC: 30 MG/DL (ref 7–18)
BUN BLD-MCNC: 44 MG/DL (ref 7–18)
BUN BLD-MCNC: 74 MG/DL (ref 7–18)
BUN/CREAT SERPL: 18.1 (ref 10–20)
BUN/CREAT SERPL: 19.4 (ref 10–20)
BUN/CREAT SERPL: 20.5 (ref 10–20)
CALCIUM BLD-MCNC: 8.7 MG/DL (ref 8.5–10.1)
CALCIUM BLD-MCNC: 8.9 MG/DL (ref 8.5–10.1)
CALCIUM BLD-MCNC: 8.9 MG/DL (ref 8.5–10.1)
CHLORIDE SERPL-SCNC: 104 MMOL/L (ref 98–112)
CHLORIDE SERPL-SCNC: 105 MMOL/L (ref 98–112)
CHLORIDE SERPL-SCNC: 105 MMOL/L (ref 98–112)
CK SERPL-CCNC: 59 U/L
CO2 SERPL-SCNC: 32 MMOL/L (ref 21–32)
CO2 SERPL-SCNC: 32 MMOL/L (ref 21–32)
CO2 SERPL-SCNC: 36 MMOL/L (ref 21–32)
CREAT BLD-MCNC: 1.46 MG/DL
CREAT BLD-MCNC: 2.43 MG/DL
CREAT BLD-MCNC: 3.81 MG/DL
DEPRECATED RDW RBC AUTO: 57.9 FL (ref 35.1–46.3)
DEPRECATED RDW RBC AUTO: 63.2 FL (ref 35.1–46.3)
EOSINOPHIL # BLD AUTO: 0 X10(3) UL (ref 0–0.7)
EOSINOPHIL # BLD: 0 X10(3) UL (ref 0–0.7)
EOSINOPHIL NFR BLD AUTO: 0 %
EOSINOPHIL NFR BLD: 0 %
ERYTHROCYTE [DISTWIDTH] IN BLOOD BY AUTOMATED COUNT: 15 % (ref 11–15)
ERYTHROCYTE [DISTWIDTH] IN BLOOD BY AUTOMATED COUNT: 15.1 % (ref 11–15)
GFR SERPLBLD BASED ON 1.73 SQ M-ARVRAT: 15 ML/MIN/1.73M2 (ref 60–?)
GFR SERPLBLD BASED ON 1.73 SQ M-ARVRAT: 26 ML/MIN/1.73M2 (ref 60–?)
GFR SERPLBLD BASED ON 1.73 SQ M-ARVRAT: 48 ML/MIN/1.73M2 (ref 60–?)
GLUCOSE BLD-MCNC: 112 MG/DL (ref 70–99)
GLUCOSE BLD-MCNC: 136 MG/DL (ref 70–99)
GLUCOSE BLD-MCNC: 148 MG/DL (ref 70–99)
GLUCOSE BLDC GLUCOMTR-MCNC: 112 MG/DL (ref 70–99)
GLUCOSE BLDC GLUCOMTR-MCNC: 121 MG/DL (ref 70–99)
GLUCOSE BLDC GLUCOMTR-MCNC: 124 MG/DL (ref 70–99)
GLUCOSE BLDC GLUCOMTR-MCNC: 127 MG/DL (ref 70–99)
HCO3 BLDA-SCNC: 28.1 MEQ/L (ref 21–27)
HCT VFR BLD AUTO: 27.6 %
HCT VFR BLD AUTO: 32.8 %
HGB BLD-MCNC: 8.3 G/DL
HGB BLD-MCNC: 9.4 G/DL
IMM GRANULOCYTES # BLD AUTO: 0.05 X10(3) UL (ref 0–1)
IMM GRANULOCYTES NFR BLD: 0.5 %
INR BLD: 1.08 (ref 0.85–1.16)
LACTATE BLD-SCNC: 4.3 MMOL/L (ref 0.5–2)
LACTATE SERPL-SCNC: 1.8 MMOL/L (ref 0.4–2)
LACTATE SERPL-SCNC: 4.6 MMOL/L (ref 0.4–2)
LYMPHOCYTES # BLD AUTO: 0.38 X10(3) UL (ref 1–4)
LYMPHOCYTES NFR BLD AUTO: 3.6 %
LYMPHOCYTES NFR BLD: 1.98 X10(3) UL (ref 1–4)
LYMPHOCYTES NFR BLD: 16 %
MCH RBC QN AUTO: 31.3 PG (ref 26–34)
MCH RBC QN AUTO: 32 PG (ref 26–34)
MCHC RBC AUTO-ENTMCNC: 28.7 G/DL (ref 31–37)
MCHC RBC AUTO-ENTMCNC: 30.1 G/DL (ref 31–37)
MCV RBC AUTO: 104.2 FL
MCV RBC AUTO: 111.6 FL
METAMYELOCYTES # BLD: 0.12 X10(3) UL
METAMYELOCYTES NFR BLD: 1 %
MONOCYTES # BLD AUTO: 0.16 X10(3) UL (ref 0.1–1)
MONOCYTES # BLD: 0.99 X10(3) UL (ref 0.1–1)
MONOCYTES NFR BLD AUTO: 1.5 %
MONOCYTES NFR BLD: 8 %
MYELOCYTES # BLD: 0.12 X10(3) UL
MYELOCYTES NFR BLD: 1 %
NEUTROPHILS # BLD AUTO: 7.84 X10 (3) UL (ref 1.5–7.7)
NEUTROPHILS # BLD AUTO: 9.95 X10 (3) UL (ref 1.5–7.7)
NEUTROPHILS # BLD AUTO: 9.95 X10(3) UL (ref 1.5–7.7)
NEUTROPHILS NFR BLD AUTO: 94.3 %
NEUTROPHILS NFR BLD: 65 %
NEUTS BAND NFR BLD: 7 %
NEUTS HYPERSEG # BLD: 8.93 X10(3) UL (ref 1.5–7.7)
NRBC BLD MANUAL-RTO: 1 %
NT-PROBNP SERPL-MCNC: 3123 PG/ML (ref ?–450)
O2 CT BLD-SCNC: 13.9 VOL% (ref 15–23)
O2/TOTAL GAS SETTING VFR VENT: 100 %
OSMOLALITY SERPL CALC.SUM OF ELEC: 304 MOSM/KG (ref 275–295)
OSMOLALITY SERPL CALC.SUM OF ELEC: 308 MOSM/KG (ref 275–295)
OSMOLALITY SERPL CALC.SUM OF ELEC: 317 MOSM/KG (ref 275–295)
P AXIS: 91 DEGREES
P-R INTERVAL: 178 MS
PCO2 BLDA: 89 MM HG (ref 35–45)
PEEP SETTING VENT: 5 CM H2O
PH BLDA: 7.19 [PH] (ref 7.35–7.45)
PLATELET # BLD AUTO: 159 10(3)UL (ref 150–450)
PLATELET # BLD AUTO: 188 10(3)UL (ref 150–450)
PLATELET MORPHOLOGY: NORMAL
PO2 BLDA: 404 MM HG (ref 80–100)
POTASSIUM SERPL-SCNC: 4.9 MMOL/L (ref 3.5–5.1)
POTASSIUM SERPL-SCNC: 5.2 MMOL/L (ref 3.5–5.1)
POTASSIUM SERPL-SCNC: 5.2 MMOL/L (ref 3.5–5.1)
PROT SERPL-MCNC: 7.2 G/DL (ref 6.4–8.2)
PROTHROMBIN TIME: 13.9 SECONDS (ref 11.6–14.8)
PUNCTURE CHARGE: YES
Q-T INTERVAL: 344 MS
Q-T INTERVAL: 416 MS
Q-T INTERVAL: 470 MS
QRS DURATION: 162 MS
QRS DURATION: 170 MS
QRS DURATION: 202 MS
QTC CALCULATION (BEZET): 510 MS
QTC CALCULATION (BEZET): 525 MS
QTC CALCULATION (BEZET): 534 MS
R AXIS: -84 DEGREES
R AXIS: 239 DEGREES
R AXIS: 268 DEGREES
RBC # BLD AUTO: 2.65 X10(6)UL
RBC # BLD AUTO: 2.94 X10(6)UL
RESP RATE: 16 BPM
SAO2 % BLDA: >99 % (ref 94–100)
SARS-COV-2 RNA RESP QL NAA+PROBE: NOT DETECTED
SODIUM SERPL-SCNC: 142 MMOL/L (ref 136–145)
SODIUM SERPL-SCNC: 142 MMOL/L (ref 136–145)
SODIUM SERPL-SCNC: 143 MMOL/L (ref 136–145)
SPECIMEN VOL 24H UR: 500 ML
T AXIS: -5 DEGREES
T AXIS: 74 DEGREES
T AXIS: 92 DEGREES
TOTAL CELLS COUNTED BLD: 100
TROPONIN I HIGH SENSITIVITY: 63 NG/L
VENTRICULAR RATE: 145 BPM
VENTRICULAR RATE: 71 BPM
VENTRICULAR RATE: 96 BPM
WBC # BLD AUTO: 10.6 X10(3) UL (ref 4–11)
WBC # BLD AUTO: 12.4 X10(3) UL (ref 4–11)

## 2023-01-01 PROCEDURE — 5A1935Z RESPIRATORY VENTILATION, LESS THAN 24 CONSECUTIVE HOURS: ICD-10-PCS | Performed by: EMERGENCY MEDICINE

## 2023-01-01 PROCEDURE — 99291 CRITICAL CARE FIRST HOUR: CPT | Performed by: INTERNAL MEDICINE

## 2023-01-01 PROCEDURE — 0BH17EZ INSERTION OF ENDOTRACHEAL AIRWAY INTO TRACHEA, VIA NATURAL OR ARTIFICIAL OPENING: ICD-10-PCS | Performed by: EMERGENCY MEDICINE

## 2023-01-01 PROCEDURE — 99233 SBSQ HOSP IP/OBS HIGH 50: CPT | Performed by: NURSE PRACTITIONER

## 2023-01-01 PROCEDURE — 71045 X-RAY EXAM CHEST 1 VIEW: CPT | Performed by: EMERGENCY MEDICINE

## 2023-01-01 PROCEDURE — 99233 SBSQ HOSP IP/OBS HIGH 50: CPT | Performed by: INTERNAL MEDICINE

## 2023-01-01 PROCEDURE — 5A12012 PERFORMANCE OF CARDIAC OUTPUT, SINGLE, MANUAL: ICD-10-PCS | Performed by: EMERGENCY MEDICINE

## 2023-01-01 PROCEDURE — 93306 TTE W/DOPPLER COMPLETE: CPT | Performed by: EMERGENCY MEDICINE

## 2023-01-01 PROCEDURE — 99233 SBSQ HOSP IP/OBS HIGH 50: CPT | Performed by: HOSPITALIST

## 2023-01-01 PROCEDURE — 71045 X-RAY EXAM CHEST 1 VIEW: CPT | Performed by: INTERNAL MEDICINE

## 2023-01-01 PROCEDURE — 99239 HOSP IP/OBS DSCHRG MGMT >30: CPT | Performed by: HOSPITALIST

## 2023-01-01 PROCEDURE — 99222 1ST HOSP IP/OBS MODERATE 55: CPT | Performed by: HOSPITALIST

## 2023-01-01 PROCEDURE — 99222 1ST HOSP IP/OBS MODERATE 55: CPT | Performed by: REGISTERED NURSE

## 2023-01-01 RX ORDER — LORAZEPAM 2 MG/ML
2 INJECTION INTRAMUSCULAR EVERY 4 HOURS PRN
Status: DISCONTINUED | OUTPATIENT
Start: 2023-01-01 | End: 2023-01-01

## 2023-01-01 RX ORDER — SCOLOPAMINE TRANSDERMAL SYSTEM 1 MG/1
1 PATCH, EXTENDED RELEASE TRANSDERMAL
Status: DISCONTINUED | OUTPATIENT
Start: 2023-01-01 | End: 2023-01-01

## 2023-01-01 RX ORDER — VANCOMYCIN HYDROCHLORIDE 125 MG/1
125 CAPSULE ORAL DAILY
Status: DISCONTINUED | OUTPATIENT
Start: 2023-01-01 | End: 2023-01-01

## 2023-01-01 RX ORDER — KETOROLAC TROMETHAMINE 15 MG/ML
15 INJECTION, SOLUTION INTRAMUSCULAR; INTRAVENOUS EVERY 6 HOURS PRN
Status: ACTIVE | OUTPATIENT
Start: 2023-01-01 | End: 2023-01-01

## 2023-01-01 RX ORDER — BISACODYL 10 MG
10 SUPPOSITORY, RECTAL RECTAL
Status: DISCONTINUED | OUTPATIENT
Start: 2023-01-01 | End: 2023-01-01

## 2023-01-01 RX ORDER — IPRATROPIUM BROMIDE AND ALBUTEROL SULFATE 2.5; .5 MG/3ML; MG/3ML
3 SOLUTION RESPIRATORY (INHALATION) EVERY 6 HOURS PRN
Status: DISCONTINUED | OUTPATIENT
Start: 2023-01-01 | End: 2023-01-01

## 2023-01-01 RX ORDER — IPRATROPIUM BROMIDE AND ALBUTEROL SULFATE 2.5; .5 MG/3ML; MG/3ML
3 SOLUTION RESPIRATORY (INHALATION) ONCE
Status: COMPLETED | OUTPATIENT
Start: 2023-01-01 | End: 2023-01-01

## 2023-01-01 RX ORDER — LORAZEPAM 2 MG/ML
0.5 INJECTION INTRAMUSCULAR EVERY 4 HOURS PRN
Status: DISCONTINUED | OUTPATIENT
Start: 2023-01-01 | End: 2023-01-01

## 2023-01-01 RX ORDER — SODIUM CHLORIDE 0.9 % (FLUSH) 0.9 %
10 SYRINGE (ML) INJECTION AS NEEDED
Status: DISCONTINUED | OUTPATIENT
Start: 2023-01-01 | End: 2023-01-01

## 2023-01-01 RX ORDER — FUROSEMIDE 10 MG/ML
40 INJECTION INTRAMUSCULAR; INTRAVENOUS EVERY 8 HOURS PRN
Status: DISCONTINUED | OUTPATIENT
Start: 2023-01-01 | End: 2023-01-01

## 2023-01-01 RX ORDER — ONDANSETRON 4 MG/1
4 TABLET, ORALLY DISINTEGRATING ORAL EVERY 6 HOURS PRN
Status: DISCONTINUED | OUTPATIENT
Start: 2023-01-01 | End: 2023-01-01

## 2023-01-01 RX ORDER — IPRATROPIUM BROMIDE AND ALBUTEROL SULFATE 2.5; .5 MG/3ML; MG/3ML
3 SOLUTION RESPIRATORY (INHALATION)
Status: DISCONTINUED | OUTPATIENT
Start: 2023-01-01 | End: 2023-01-01

## 2023-01-01 RX ORDER — POLYETHYLENE GLYCOL 3350 17 G/17G
17 POWDER, FOR SOLUTION ORAL DAILY PRN
Status: DISCONTINUED | OUTPATIENT
Start: 2023-01-01 | End: 2023-01-01

## 2023-01-01 RX ORDER — CALCIUM GLUCONATE 10 MG/ML
INJECTION, SOLUTION INTRAVENOUS
Status: COMPLETED | OUTPATIENT
Start: 2023-01-01 | End: 2023-01-01

## 2023-01-01 RX ORDER — HALOPERIDOL 5 MG/ML
2 INJECTION INTRAMUSCULAR
Status: DISCONTINUED | OUTPATIENT
Start: 2023-01-01 | End: 2023-01-01

## 2023-01-01 RX ORDER — AMIODARONE HYDROCHLORIDE 50 MG/ML
INJECTION, SOLUTION INTRAVENOUS
Status: COMPLETED | OUTPATIENT
Start: 2023-01-01 | End: 2023-01-01

## 2023-01-01 RX ORDER — HALOPERIDOL 5 MG/ML
1 INJECTION INTRAMUSCULAR
Status: DISCONTINUED | OUTPATIENT
Start: 2023-01-01 | End: 2023-01-01

## 2023-01-01 RX ORDER — FAMOTIDINE 10 MG/ML
20 INJECTION, SOLUTION INTRAVENOUS DAILY
Status: DISCONTINUED | OUTPATIENT
Start: 2023-01-01 | End: 2023-01-01

## 2023-01-01 RX ORDER — ONDANSETRON 2 MG/ML
4 INJECTION INTRAMUSCULAR; INTRAVENOUS EVERY 6 HOURS PRN
Status: DISCONTINUED | OUTPATIENT
Start: 2023-01-01 | End: 2023-01-01

## 2023-01-01 RX ORDER — ACETAMINOPHEN 650 MG/1
650 SUPPOSITORY RECTAL EVERY 4 HOURS PRN
Status: DISCONTINUED | OUTPATIENT
Start: 2023-01-01 | End: 2023-01-01

## 2023-01-01 RX ORDER — SODIUM PHOSPHATE, DIBASIC AND SODIUM PHOSPHATE, MONOBASIC 7; 19 G/133ML; G/133ML
1 ENEMA RECTAL ONCE AS NEEDED
Status: DISCONTINUED | OUTPATIENT
Start: 2023-01-01 | End: 2023-01-01

## 2023-01-01 RX ORDER — METHYLPREDNISOLONE SODIUM SUCCINATE 125 MG/2ML
60 INJECTION, POWDER, LYOPHILIZED, FOR SOLUTION INTRAMUSCULAR; INTRAVENOUS EVERY 12 HOURS
Status: DISCONTINUED | OUTPATIENT
Start: 2023-01-01 | End: 2023-01-01

## 2023-01-01 RX ORDER — SENNOSIDES 8.8 MG/5ML
10 LIQUID ORAL NIGHTLY PRN
Status: DISCONTINUED | OUTPATIENT
Start: 2023-01-01 | End: 2023-01-01

## 2023-01-01 RX ORDER — SODIUM CHLORIDE 9 MG/ML
INJECTION, SOLUTION INTRAVENOUS
Status: COMPLETED | OUTPATIENT
Start: 2023-01-01 | End: 2023-01-01

## 2023-01-01 RX ORDER — ACETAMINOPHEN 160 MG/5ML
650 SOLUTION ORAL EVERY 4 HOURS PRN
Status: DISCONTINUED | OUTPATIENT
Start: 2023-01-01 | End: 2023-01-01

## 2023-01-01 RX ORDER — LORAZEPAM 2 MG/ML
1 INJECTION INTRAMUSCULAR EVERY 4 HOURS PRN
Status: DISCONTINUED | OUTPATIENT
Start: 2023-01-01 | End: 2023-01-01

## 2023-01-01 RX ORDER — MORPHINE SULFATE 2 MG/ML
1 INJECTION, SOLUTION INTRAMUSCULAR; INTRAVENOUS
Status: DISCONTINUED | OUTPATIENT
Start: 2023-01-01 | End: 2023-01-01

## 2023-01-01 RX ORDER — SODIUM CHLORIDE 9 MG/ML
INJECTION, SOLUTION INTRAVENOUS CONTINUOUS
Status: DISCONTINUED | OUTPATIENT
Start: 2023-01-01 | End: 2023-01-01

## 2023-01-01 RX ORDER — SODIUM CHLORIDE 9 MG/ML
1000 INJECTION, SOLUTION INTRAVENOUS ONCE
Status: COMPLETED | OUTPATIENT
Start: 2023-01-01 | End: 2023-01-01

## 2023-01-01 RX ORDER — ACETAMINOPHEN 325 MG/1
650 TABLET ORAL EVERY 4 HOURS PRN
Status: DISCONTINUED | OUTPATIENT
Start: 2023-01-01 | End: 2023-01-01

## 2023-01-01 RX ORDER — ACETAMINOPHEN 10 MG/ML
1000 INJECTION, SOLUTION INTRAVENOUS EVERY 6 HOURS PRN
Status: DISCONTINUED | OUTPATIENT
Start: 2023-01-01 | End: 2023-01-01

## 2023-01-01 RX ORDER — FUROSEMIDE 40 MG/1
40 TABLET ORAL EVERY 8 HOURS PRN
Status: DISCONTINUED | OUTPATIENT
Start: 2023-01-01 | End: 2023-01-01

## 2023-01-01 RX ORDER — CHLORHEXIDINE GLUCONATE 0.12 MG/ML
15 RINSE ORAL
Status: DISCONTINUED | OUTPATIENT
Start: 2023-01-01 | End: 2023-01-01

## 2023-01-01 RX ORDER — HEPARIN SODIUM 5000 [USP'U]/ML
5000 INJECTION, SOLUTION INTRAVENOUS; SUBCUTANEOUS EVERY 12 HOURS SCHEDULED
Status: DISCONTINUED | OUTPATIENT
Start: 2023-01-01 | End: 2023-01-01

## 2023-01-01 RX ORDER — FUROSEMIDE 10 MG/ML
40 INJECTION INTRAMUSCULAR; INTRAVENOUS ONCE
Status: COMPLETED | OUTPATIENT
Start: 2023-01-01 | End: 2023-01-01

## 2023-01-01 RX ORDER — GLYCOPYRROLATE 0.2 MG/ML
0.2 INJECTION, SOLUTION INTRAMUSCULAR; INTRAVENOUS
Status: DISCONTINUED | OUTPATIENT
Start: 2023-01-01 | End: 2023-01-01

## 2023-01-03 ENCOUNTER — EXTERNAL FACILITY (OUTPATIENT)
Facility: CLINIC | Age: 83
End: 2023-01-03

## 2023-01-03 DIAGNOSIS — C34.90 MALIGNANT NEOPLASM OF LUNG, UNSPECIFIED LATERALITY, UNSPECIFIED PART OF LUNG (HCC): ICD-10-CM

## 2023-01-03 DIAGNOSIS — J44.1 COPD EXACERBATION (HCC): ICD-10-CM

## 2023-01-03 DIAGNOSIS — I50.9 HEART FAILURE, UNSPECIFIED HF CHRONICITY, UNSPECIFIED HEART FAILURE TYPE (HCC): ICD-10-CM

## 2023-01-04 ENCOUNTER — INITIAL APN SNF VISIT (OUTPATIENT)
Dept: INTERNAL MEDICINE CLINIC | Facility: SKILLED NURSING FACILITY | Age: 83
End: 2023-01-04

## 2023-01-04 VITALS
TEMPERATURE: 97 F | DIASTOLIC BLOOD PRESSURE: 62 MMHG | BODY MASS INDEX: 26 KG/M2 | RESPIRATION RATE: 18 BRPM | OXYGEN SATURATION: 94 % | SYSTOLIC BLOOD PRESSURE: 117 MMHG | HEART RATE: 71 BPM | WEIGHT: 205.5 LBS

## 2023-01-04 DIAGNOSIS — N17.9 AKI (ACUTE KIDNEY INJURY) (HCC): ICD-10-CM

## 2023-01-04 DIAGNOSIS — I50.9 HEART FAILURE, UNSPECIFIED HF CHRONICITY, UNSPECIFIED HEART FAILURE TYPE (HCC): ICD-10-CM

## 2023-01-04 DIAGNOSIS — U07.1 COVID-19: ICD-10-CM

## 2023-01-04 DIAGNOSIS — R53.1 GENERALIZED WEAKNESS: ICD-10-CM

## 2023-01-04 DIAGNOSIS — J44.1 COPD EXACERBATION (HCC): ICD-10-CM

## 2023-01-04 PROCEDURE — 1111F DSCHRG MED/CURRENT MED MERGE: CPT | Performed by: NURSE PRACTITIONER

## 2023-01-04 PROCEDURE — 99309 SBSQ NF CARE MODERATE MDM 30: CPT | Performed by: NURSE PRACTITIONER

## 2023-01-04 PROCEDURE — 1123F ACP DISCUSS/DSCN MKR DOCD: CPT | Performed by: NURSE PRACTITIONER

## 2023-01-05 ENCOUNTER — APPOINTMENT (OUTPATIENT)
Dept: HEMATOLOGY/ONCOLOGY | Facility: HOSPITAL | Age: 83
End: 2023-01-05
Attending: STUDENT IN AN ORGANIZED HEALTH CARE EDUCATION/TRAINING PROGRAM
Payer: MEDICARE

## 2023-01-05 ENCOUNTER — TELEPHONE (OUTPATIENT)
Dept: HEMATOLOGY/ONCOLOGY | Facility: HOSPITAL | Age: 83
End: 2023-01-05

## 2023-01-05 NOTE — TELEPHONE ENCOUNTER
Writer called [facility] as patient did not show up for lab or pre-chemo appointment. Spoke with Lynne Humphries in transportation for facility, in which she stated that they did not have any appointment reminders for patient. Patient situation review, as patient's appointments were scheduled on 12/222 prior to patient discharge to [facility] on 12/23. Writer was informed that the After Visit Summary with appointments did not come with patient upon arrival to facility, as patient had another hospitalization at [facility] prior to admission at current facility [facility]) on 1/1/23. Writer appreciated the input and inform Lynne Humphries that appointments will be push out to next week and facility will be contacted when new appointments are scheduled.

## 2023-01-06 ENCOUNTER — EXTERNAL FACILITY (OUTPATIENT)
Dept: PULMONOLOGY | Facility: CLINIC | Age: 83
End: 2023-01-06

## 2023-01-06 ENCOUNTER — TELEPHONE (OUTPATIENT)
Dept: RADIATION ONCOLOGY | Facility: HOSPITAL | Age: 83
End: 2023-01-06

## 2023-01-06 ENCOUNTER — EXTERNAL FACILITY (OUTPATIENT)
Facility: CLINIC | Age: 83
End: 2023-01-06

## 2023-01-06 DIAGNOSIS — J44.1 COPD EXACERBATION (HCC): ICD-10-CM

## 2023-01-06 DIAGNOSIS — U07.1 COVID-19: ICD-10-CM

## 2023-01-06 DIAGNOSIS — C34.91 SQUAMOUS CELL CARCINOMA OF RIGHT LUNG (HCC): ICD-10-CM

## 2023-01-06 DIAGNOSIS — J96.11 CHRONIC RESPIRATORY FAILURE WITH HYPOXIA (HCC): Primary | ICD-10-CM

## 2023-01-06 DIAGNOSIS — I50.9 HEART FAILURE, UNSPECIFIED HF CHRONICITY, UNSPECIFIED HEART FAILURE TYPE (HCC): ICD-10-CM

## 2023-01-06 DIAGNOSIS — J44.9 CHRONIC OBSTRUCTIVE PULMONARY DISEASE, UNSPECIFIED COPD TYPE (HCC): ICD-10-CM

## 2023-01-06 DIAGNOSIS — R53.1 WEAKNESS GENERALIZED: ICD-10-CM

## 2023-01-07 NOTE — DISCHARGE SUMMARY
[facility]    PATIENT'S NAME: Dustin Benito   ATTENDING PHYSICIAN: Laila Penn MD   PATIENT ACCOUNT#:   [id number]    LOCATION:  [address] [place] [address] RECORD #:   [id number]       YOB: 1940  ADMISSION DATE:       12/13/2022      DISCHARGE DATE:  12/23/2022    DISCHARGE SUMMARY    About 45 minutes were spent coordinating care with Nursing, preparing this discharge, and counseling the patient's sister with his permission on the phone. 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 The patient was admitted to the hospital with COPD exacerbation and was seen by Dr. Janie Kovacs was discovered. The patient was observed and had a very difficult time with steroids and antibiotics. He was continued on remdesivir because of all of his comorbidities. He was seen by Oncology here and also by Nephrology as the patient had persistent elevated creatinine and potassium. Dr. Jadyn Hernandez adjusted. The patient had a very, very slow recovery and eventually normalized well enough. The patient stated he still did not feel very well. Objectively he looked better and was able to be discharged to rehab at [facility]. PHYSICAL EXAMINATION:    VITAL SIGNS:  On discharge temperature 97.4, pulse 72, respiratory rate 20, blood pressure 137/69, 97% on 2L nasal cannula. LUNGS:  Occasional rhonchi and poor air movement. HEART:  Normal S1, S2. No S3.  ABDOMEN:  Soft. EXTREMITIES:  Without significant calf tenderness. NEUROLOGIC:  He is alert, oriented, friendly, and cooperative. LABORATORY STUDIES:  Please see chart. ASSESSMENT AND PLAN:    1. Chronic obstructive pulmonary disease with acute exacerbation perhaps related to COVID-19. The patient doing well. Continue on 2L nasal cannula.   2. Metastatic squamous cell lung cancer. Radiation and immunotherapy as per Oncology. 3.   Hypertension. 4.   Acute renal failure on chronic kidney disease stage 3. As per Nephrology. 5.   COVID. Finished remdesivir. On steroids. 6.   Deep vein thrombosis prophylaxis. Subcutaneous heparin. CONDITION ON DISCHARGE:  Stable. CODE STATUS:  DNAR Select. DISCHARGE MEDICATIONS:    1. Lipitor 40 mg daily. Watch for muscle weakness. 2.   Vitamin D 2000 units by mouth daily. 3.   DuoNeb every 4 hours as needed. 4.   Omeprazole 40 mg daily. 5.   ProAir 2 puffs every 4 hours as needed. 6.   Vitamin B12, 1000 mcg daily. 7.   Trelegy Ellipta 1 puff daily. Rinse mouth after use. 8.   Tylenol 500 mg every 6 hours as needed. Watch total daily Tylenol limit to 3 g.  9.   Tessalon 200 mg 3 times a day as needed for cough. 10.   Dulcolax suppository 10 mg daily. 11.   Colace 100 mg p.o. b.i.d. p.r.n. constipation. 12.   Guaifenesin 200 mg every 4 hours as needed. Watch for nausea. 13.   Melatonin 1 mg daily. 14.   MiraLAX 17 g daily. 15.   Prednisone 30 mg daily for 2 days, then 20 mg daily for 3 days, then 10 mg daily for 3 days, then stop. 16.   Lactulose 30 mL every 6 hours as needed for severe constipation. ACTIVITY:  PT/OT, otherwise as tolerated. DIET:  Low salt, otherwise as tolerated. FOLLOWUP:  Dr. Rae Snell or his designate at rehab in a few days. Dr. Rae Snell in 2 weeks or as soon as possible after rehab discharge, Dr. Coby Mederos in 1 week, Dr. Al Beth in 2 weeks, Dr. Libia Flores in 1 week. Followup CBC, BMP, magnesium, and phosphorus on 12/26 to be called to Dr. Coby Mederos. Speech therapy as able. POLST form sent. RISK OF READMISSION:  Extremely high. TCM followup is recommended. Dictated By Mercedes Tovar.  MD Fili  d: 01/06/2023 14:58:35  t: 01/06/2023 15:56:10  Job [id number]  [facility]/

## 2023-01-09 ENCOUNTER — TELEPHONE (OUTPATIENT)
Dept: HEMATOLOGY/ONCOLOGY | Facility: HOSPITAL | Age: 83
End: 2023-01-09

## 2023-01-09 ENCOUNTER — SNF VISIT (OUTPATIENT)
Dept: INTERNAL MEDICINE CLINIC | Facility: SKILLED NURSING FACILITY | Age: 83
End: 2023-01-09

## 2023-01-09 VITALS
WEIGHT: 205.5 LBS | DIASTOLIC BLOOD PRESSURE: 55 MMHG | SYSTOLIC BLOOD PRESSURE: 120 MMHG | BODY MASS INDEX: 26 KG/M2 | HEART RATE: 69 BPM | OXYGEN SATURATION: 98 % | RESPIRATION RATE: 20 BRPM | TEMPERATURE: 97 F

## 2023-01-09 DIAGNOSIS — C34.90 PRIMARY MALIGNANT NEOPLASM OF LUNG METASTATIC TO OTHER SITE, UNSPECIFIED LATERALITY (HCC): ICD-10-CM

## 2023-01-09 DIAGNOSIS — R06.89 HYPERCAPNIA: ICD-10-CM

## 2023-01-09 DIAGNOSIS — N17.9 AKI (ACUTE KIDNEY INJURY) (HCC): ICD-10-CM

## 2023-01-09 DIAGNOSIS — J44.1 COPD EXACERBATION (HCC): ICD-10-CM

## 2023-01-09 DIAGNOSIS — I50.9 HEART FAILURE, UNSPECIFIED HF CHRONICITY, UNSPECIFIED HEART FAILURE TYPE (HCC): ICD-10-CM

## 2023-01-09 NOTE — TELEPHONE ENCOUNTER
called [facility] and asked to speak with Keith Tolbert in transport service. Erinr was placed on hold for 6min by , then informed that Keith Tolbert was on another call. Erinr provided  with patient appointment times for tomorrow 1/10. Call back number provided. When appointments were made on 1/5, documentation from scheduling that Keith Tolbert was notified as well.

## 2023-01-10 ENCOUNTER — NURSE ONLY (OUTPATIENT)
Dept: HEMATOLOGY/ONCOLOGY | Facility: HOSPITAL | Age: 83
End: 2023-01-10
Attending: STUDENT IN AN ORGANIZED HEALTH CARE EDUCATION/TRAINING PROGRAM
Payer: MEDICARE

## 2023-01-10 ENCOUNTER — EXTERNAL FACILITY (OUTPATIENT)
Facility: CLINIC | Age: 83
End: 2023-01-10

## 2023-01-10 ENCOUNTER — OFFICE VISIT (OUTPATIENT)
Dept: RADIATION ONCOLOGY | Facility: HOSPITAL | Age: 83
End: 2023-01-10
Attending: RADIOLOGY
Payer: MEDICARE

## 2023-01-10 VITALS
HEART RATE: 71 BPM | BODY MASS INDEX: 26.24 KG/M2 | OXYGEN SATURATION: 99 % | RESPIRATION RATE: 18 BRPM | SYSTOLIC BLOOD PRESSURE: 103 MMHG | WEIGHT: 198 LBS | HEIGHT: 73 IN | TEMPERATURE: 98 F | DIASTOLIC BLOOD PRESSURE: 49 MMHG

## 2023-01-10 DIAGNOSIS — C34.11 MALIGNANT NEOPLASM OF UPPER LOBE OF RIGHT LUNG (HCC): Primary | ICD-10-CM

## 2023-01-10 DIAGNOSIS — J44.1 COPD EXACERBATION (HCC): ICD-10-CM

## 2023-01-10 DIAGNOSIS — D69.6 THROMBOCYTOPENIA (HCC): ICD-10-CM

## 2023-01-10 DIAGNOSIS — C34.91 SQUAMOUS CELL CARCINOMA OF RIGHT LUNG (HCC): ICD-10-CM

## 2023-01-10 DIAGNOSIS — Z51.12 ENCOUNTER FOR ANTINEOPLASTIC IMMUNOTHERAPY: ICD-10-CM

## 2023-01-10 DIAGNOSIS — D64.9 ANEMIA, UNSPECIFIED TYPE: ICD-10-CM

## 2023-01-10 DIAGNOSIS — I50.9 HEART FAILURE, UNSPECIFIED HF CHRONICITY, UNSPECIFIED HEART FAILURE TYPE (HCC): ICD-10-CM

## 2023-01-10 LAB
ALBUMIN SERPL-MCNC: 2.3 G/DL (ref 3.4–5)
ALBUMIN/GLOB SERPL: 0.6 {RATIO} (ref 1–2)
ALP LIVER SERPL-CCNC: 58 U/L
ALT SERPL-CCNC: 24 U/L
ANION GAP SERPL CALC-SCNC: 5 MMOL/L (ref 0–18)
AST SERPL-CCNC: 10 U/L (ref 15–37)
BASOPHILS # BLD AUTO: 0.01 X10(3) UL (ref 0–0.2)
BASOPHILS NFR BLD AUTO: 0.1 %
BILIRUB SERPL-MCNC: 0.6 MG/DL (ref 0.1–2)
BUN BLD-MCNC: 22 MG/DL (ref 7–18)
BUN/CREAT SERPL: 18 (ref 10–20)
CALCIUM BLD-MCNC: 8.5 MG/DL (ref 8.5–10.1)
CHLORIDE SERPL-SCNC: 102 MMOL/L (ref 98–112)
CO2 SERPL-SCNC: 38 MMOL/L (ref 21–32)
CREAT BLD-MCNC: 1.22 MG/DL
DEPRECATED RDW RBC AUTO: 54 FL (ref 35.1–46.3)
EOSINOPHIL # BLD AUTO: 0.12 X10(3) UL (ref 0–0.7)
EOSINOPHIL NFR BLD AUTO: 1.6 %
ERYTHROCYTE [DISTWIDTH] IN BLOOD BY AUTOMATED COUNT: 14.2 % (ref 11–15)
GFR SERPLBLD BASED ON 1.73 SQ M-ARVRAT: 60 ML/MIN/1.73M2 (ref 60–?)
GLOBULIN PLAS-MCNC: 3.9 G/DL (ref 2.8–4.4)
GLUCOSE BLD-MCNC: 166 MG/DL (ref 70–99)
HCT VFR BLD AUTO: 30.9 %
HGB BLD-MCNC: 9.1 G/DL
IMM GRANULOCYTES # BLD AUTO: 0.03 X10(3) UL (ref 0–1)
IMM GRANULOCYTES NFR BLD: 0.4 %
LYMPHOCYTES # BLD AUTO: 0.73 X10(3) UL (ref 1–4)
LYMPHOCYTES NFR BLD AUTO: 9.5 %
MCH RBC QN AUTO: 31 PG (ref 26–34)
MCHC RBC AUTO-ENTMCNC: 29.4 G/DL (ref 31–37)
MCV RBC AUTO: 105.1 FL
MONOCYTES # BLD AUTO: 0.46 X10(3) UL (ref 0.1–1)
MONOCYTES NFR BLD AUTO: 6 %
NEUTROPHILS # BLD AUTO: 6.37 X10 (3) UL (ref 1.5–7.7)
NEUTROPHILS # BLD AUTO: 6.37 X10(3) UL (ref 1.5–7.7)
NEUTROPHILS NFR BLD AUTO: 82.4 %
OSMOLALITY SERPL CALC.SUM OF ELEC: 307 MOSM/KG (ref 275–295)
PLATELET # BLD AUTO: 208 10(3)UL (ref 150–450)
POTASSIUM SERPL-SCNC: 4.1 MMOL/L (ref 3.5–5.1)
PROT SERPL-MCNC: 6.2 G/DL (ref 6.4–8.2)
RBC # BLD AUTO: 2.94 X10(6)UL
SODIUM SERPL-SCNC: 145 MMOL/L (ref 136–145)
WBC # BLD AUTO: 7.7 X10(3) UL (ref 4–11)

## 2023-01-10 PROCEDURE — 96413 CHEMO IV INFUSION 1 HR: CPT

## 2023-01-10 PROCEDURE — 85025 COMPLETE CBC W/AUTO DIFF WBC: CPT

## 2023-01-10 PROCEDURE — 99215 OFFICE O/P EST HI 40 MIN: CPT | Performed by: STUDENT IN AN ORGANIZED HEALTH CARE EDUCATION/TRAINING PROGRAM

## 2023-01-10 PROCEDURE — 80053 COMPREHEN METABOLIC PANEL: CPT

## 2023-01-10 NOTE — PROGRESS NOTES
Pt here for C10D1 Keytruda. Arrives Via wheelchair and oxygen from appointment prior to infusion. Modifications in dose or schedule:  No.       PIV established in lab. Flushed with blood return noted. Frequency of blood return and site check throughout administration: Prior to administration, Prior to each drug and At completion of therapy     Pt tolerated treatment. No s/s adverse reactions noted. PIV flushed and removed. Site covered with gauze and coban. Discharged to Home, Via wheelchair, accompanied by: self. AVS provided. He was picked up by [facility] Care Plan  Problem list:  thrombocytopenia  fatigue  knowledge deficit  nausea and vomiting  Problems related to:    chemotherapy  Interventions:  monitor for signs/symptoms of infection  patient/family supported  encourage activity as tolerated  provided general teaching  Expected outcomes:  family support/coping well  patient supported/coping well  safe in environment  verbalize how to care for self  Progress towards outcome:  making progress    Education Record    Learner:  Patient  Barriers / Limitations:  Education level, Emotional factors and Fatigue  Method:  Brief focused, Discussion and Printed material  Outcome:  Needs reinforcement  Comments:He tolerated his treatment well.

## 2023-01-11 ENCOUNTER — EXTERNAL FACILITY (OUTPATIENT)
Dept: PULMONOLOGY | Facility: CLINIC | Age: 83
End: 2023-01-11

## 2023-01-11 ENCOUNTER — TELEPHONE (OUTPATIENT)
Dept: RADIATION ONCOLOGY | Facility: HOSPITAL | Age: 83
End: 2023-01-11

## 2023-01-11 DIAGNOSIS — C34.91 SQUAMOUS CELL CARCINOMA OF RIGHT LUNG (HCC): ICD-10-CM

## 2023-01-11 DIAGNOSIS — J96.11 CHRONIC RESPIRATORY FAILURE WITH HYPOXIA (HCC): Primary | ICD-10-CM

## 2023-01-11 DIAGNOSIS — J44.9 CHRONIC OBSTRUCTIVE PULMONARY DISEASE, UNSPECIFIED COPD TYPE (HCC): ICD-10-CM

## 2023-01-11 PROCEDURE — 1111F DSCHRG MED/CURRENT MED MERGE: CPT | Performed by: PHYSICIAN ASSISTANT

## 2023-01-11 PROCEDURE — 99308 SBSQ NF CARE LOW MDM 20: CPT | Performed by: PHYSICIAN ASSISTANT

## 2023-01-11 NOTE — PROGRESS NOTES
RAD/ONC NOTE    Pt was seen in infusion today while receiving immmunotherapy. Pt is still recovering from recent COVID infx and reports less than 50% return to baseline. Therefore, we will defer further SBRT to lung lesion until further recovery. Ideally pt will not have new dz on restaging CT and we will have pt f/u with us in 6-8 weeks.     Thank you, Russell Hunt MD

## 2023-01-12 ENCOUNTER — SNF VISIT (OUTPATIENT)
Dept: INTERNAL MEDICINE CLINIC | Facility: SKILLED NURSING FACILITY | Age: 83
End: 2023-01-12

## 2023-01-12 DIAGNOSIS — I10 PRIMARY HYPERTENSION: ICD-10-CM

## 2023-01-12 DIAGNOSIS — J44.1 COPD EXACERBATION (HCC): ICD-10-CM

## 2023-01-12 DIAGNOSIS — N18.30 STAGE 3 CHRONIC KIDNEY DISEASE, UNSPECIFIED WHETHER STAGE 3A OR 3B CKD (HCC): ICD-10-CM

## 2023-01-12 DIAGNOSIS — C34.11 MALIGNANT NEOPLASM OF UPPER LOBE OF RIGHT LUNG (HCC): ICD-10-CM

## 2023-01-12 PROCEDURE — 1111F DSCHRG MED/CURRENT MED MERGE: CPT | Performed by: NURSE PRACTITIONER

## 2023-01-12 PROCEDURE — 99308 SBSQ NF CARE LOW MDM 20: CPT | Performed by: NURSE PRACTITIONER

## 2023-01-13 ENCOUNTER — EXTERNAL FACILITY (OUTPATIENT)
Facility: CLINIC | Age: 83
End: 2023-01-13

## 2023-01-13 DIAGNOSIS — T14.8XXA BLISTER: ICD-10-CM

## 2023-01-13 DIAGNOSIS — J44.1 COPD EXACERBATION (HCC): ICD-10-CM

## 2023-01-13 DIAGNOSIS — S81.011A LACERATION OF RIGHT KNEE, INITIAL ENCOUNTER: ICD-10-CM

## 2023-01-16 ENCOUNTER — SNF VISIT (OUTPATIENT)
Dept: INTERNAL MEDICINE CLINIC | Facility: SKILLED NURSING FACILITY | Age: 83
End: 2023-01-16

## 2023-01-16 VITALS
TEMPERATURE: 98 F | HEART RATE: 70 BPM | WEIGHT: 205.5 LBS | RESPIRATION RATE: 20 BRPM | BODY MASS INDEX: 27 KG/M2 | OXYGEN SATURATION: 98 % | SYSTOLIC BLOOD PRESSURE: 111 MMHG | DIASTOLIC BLOOD PRESSURE: 50 MMHG

## 2023-01-16 DIAGNOSIS — I50.9 HEART FAILURE, UNSPECIFIED HF CHRONICITY, UNSPECIFIED HEART FAILURE TYPE (HCC): ICD-10-CM

## 2023-01-16 DIAGNOSIS — R09.89 CHEST CONGESTION: ICD-10-CM

## 2023-01-16 DIAGNOSIS — J44.1 COPD EXACERBATION (HCC): ICD-10-CM

## 2023-01-16 DIAGNOSIS — C34.90 MALIGNANT NEOPLASM OF LUNG, UNSPECIFIED LATERALITY, UNSPECIFIED PART OF LUNG (HCC): ICD-10-CM

## 2023-01-16 DIAGNOSIS — R53.1 GENERALIZED WEAKNESS: ICD-10-CM

## 2023-01-16 PROCEDURE — 99309 SBSQ NF CARE MODERATE MDM 30: CPT | Performed by: NURSE PRACTITIONER

## 2023-01-16 PROCEDURE — 1111F DSCHRG MED/CURRENT MED MERGE: CPT | Performed by: NURSE PRACTITIONER

## 2023-01-17 ENCOUNTER — EXTERNAL FACILITY (OUTPATIENT)
Facility: CLINIC | Age: 83
End: 2023-01-17

## 2023-01-17 DIAGNOSIS — D64.9 ANEMIA, UNSPECIFIED TYPE: ICD-10-CM

## 2023-01-17 DIAGNOSIS — R06.2 WHEEZING: ICD-10-CM

## 2023-01-17 DIAGNOSIS — J44.9 CHRONIC OBSTRUCTIVE PULMONARY DISEASE, UNSPECIFIED COPD TYPE (HCC): ICD-10-CM

## 2023-01-17 PROCEDURE — 1111F DSCHRG MED/CURRENT MED MERGE: CPT | Performed by: INTERNAL MEDICINE

## 2023-01-17 PROCEDURE — 99308 SBSQ NF CARE LOW MDM 20: CPT | Performed by: INTERNAL MEDICINE

## 2023-01-20 ENCOUNTER — EXTERNAL FACILITY (OUTPATIENT)
Facility: CLINIC | Age: 83
End: 2023-01-20

## 2023-01-20 DIAGNOSIS — J96.11 CHRONIC RESPIRATORY FAILURE WITH HYPOXIA AND HYPERCAPNIA (HCC): ICD-10-CM

## 2023-01-20 DIAGNOSIS — J96.12 CHRONIC RESPIRATORY FAILURE WITH HYPOXIA AND HYPERCAPNIA (HCC): ICD-10-CM

## 2023-01-20 DIAGNOSIS — J44.9 CHRONIC OBSTRUCTIVE PULMONARY DISEASE, UNSPECIFIED COPD TYPE (HCC): ICD-10-CM

## 2023-01-20 DIAGNOSIS — C34.11 MALIGNANT NEOPLASM OF UPPER LOBE OF RIGHT LUNG (HCC): ICD-10-CM

## 2023-01-20 DIAGNOSIS — R53.1 WEAKNESS GENERALIZED: ICD-10-CM

## 2023-01-20 PROCEDURE — 1111F DSCHRG MED/CURRENT MED MERGE: CPT | Performed by: INTERNAL MEDICINE

## 2023-01-20 PROCEDURE — 99308 SBSQ NF CARE LOW MDM 20: CPT | Performed by: INTERNAL MEDICINE

## 2023-01-23 ENCOUNTER — SNF VISIT (OUTPATIENT)
Dept: INTERNAL MEDICINE CLINIC | Facility: SKILLED NURSING FACILITY | Age: 83
End: 2023-01-23

## 2023-01-23 VITALS
OXYGEN SATURATION: 98 % | WEIGHT: 205.5 LBS | RESPIRATION RATE: 18 BRPM | SYSTOLIC BLOOD PRESSURE: 116 MMHG | HEART RATE: 64 BPM | BODY MASS INDEX: 27 KG/M2 | TEMPERATURE: 98 F | DIASTOLIC BLOOD PRESSURE: 64 MMHG

## 2023-01-23 DIAGNOSIS — N17.9 AKI (ACUTE KIDNEY INJURY) (HCC): ICD-10-CM

## 2023-01-23 DIAGNOSIS — J44.1 COPD EXACERBATION (HCC): ICD-10-CM

## 2023-01-23 DIAGNOSIS — C34.90 NON-SMALL CELL LUNG CANCER, UNSPECIFIED LATERALITY (HCC): ICD-10-CM

## 2023-01-23 DIAGNOSIS — I50.9 HEART FAILURE, UNSPECIFIED HF CHRONICITY, UNSPECIFIED HEART FAILURE TYPE (HCC): ICD-10-CM

## 2023-01-24 ENCOUNTER — EXTERNAL FACILITY (OUTPATIENT)
Facility: CLINIC | Age: 83
End: 2023-01-24

## 2023-01-24 DIAGNOSIS — S81.011S LACERATION OF RIGHT KNEE, SEQUELA: ICD-10-CM

## 2023-01-24 DIAGNOSIS — D64.9 ANEMIA, UNSPECIFIED TYPE: ICD-10-CM

## 2023-01-24 DIAGNOSIS — J42 CHRONIC BRONCHITIS, UNSPECIFIED CHRONIC BRONCHITIS TYPE (HCC): ICD-10-CM

## 2023-01-24 PROCEDURE — 99308 SBSQ NF CARE LOW MDM 20: CPT | Performed by: INTERNAL MEDICINE

## 2023-01-25 ENCOUNTER — EXTERNAL FACILITY (OUTPATIENT)
Dept: PULMONOLOGY | Facility: CLINIC | Age: 83
End: 2023-01-25

## 2023-01-25 DIAGNOSIS — J44.9 CHRONIC OBSTRUCTIVE PULMONARY DISEASE, UNSPECIFIED COPD TYPE (HCC): ICD-10-CM

## 2023-01-25 DIAGNOSIS — C34.91 SQUAMOUS CELL CARCINOMA OF RIGHT LUNG (HCC): ICD-10-CM

## 2023-01-25 DIAGNOSIS — J96.11 CHRONIC RESPIRATORY FAILURE WITH HYPOXIA (HCC): Primary | ICD-10-CM

## 2023-01-25 PROCEDURE — 99309 SBSQ NF CARE MODERATE MDM 30: CPT | Performed by: PHYSICIAN ASSISTANT

## 2023-01-26 ENCOUNTER — APPOINTMENT (OUTPATIENT)
Dept: HEMATOLOGY/ONCOLOGY | Facility: HOSPITAL | Age: 83
End: 2023-01-26
Attending: STUDENT IN AN ORGANIZED HEALTH CARE EDUCATION/TRAINING PROGRAM
Payer: MEDICARE

## 2023-01-28 ENCOUNTER — EXTERNAL FACILITY (OUTPATIENT)
Facility: CLINIC | Age: 83
End: 2023-01-28

## 2023-01-28 DIAGNOSIS — J42 CHRONIC BRONCHITIS, UNSPECIFIED CHRONIC BRONCHITIS TYPE (HCC): ICD-10-CM

## 2023-01-28 DIAGNOSIS — D64.9 ANEMIA, UNSPECIFIED TYPE: ICD-10-CM

## 2023-01-28 PROCEDURE — 99308 SBSQ NF CARE LOW MDM 20: CPT | Performed by: INTERNAL MEDICINE

## 2023-01-31 ENCOUNTER — HOSPITAL ENCOUNTER (INPATIENT)
Facility: HOSPITAL | Age: 83
LOS: 2 days | Discharge: NURSING, REHAB OR LONG-TERM CARE | End: 2023-02-02
Attending: EMERGENCY MEDICINE | Admitting: HOSPITALIST
Payer: MEDICARE

## 2023-01-31 ENCOUNTER — APPOINTMENT (OUTPATIENT)
Dept: GENERAL RADIOLOGY | Facility: HOSPITAL | Age: 83
End: 2023-01-31
Attending: EMERGENCY MEDICINE
Payer: MEDICARE

## 2023-01-31 DIAGNOSIS — E16.2 HYPOGLYCEMIA: Primary | ICD-10-CM

## 2023-01-31 PROBLEM — E83.51 HYPOCALCEMIA: Status: ACTIVE | Noted: 2023-01-31

## 2023-01-31 PROBLEM — E83.51 HYPOCALCEMIA: Status: ACTIVE | Noted: 2023-01-01

## 2023-01-31 LAB
ALBUMIN SERPL-MCNC: 2.7 G/DL (ref 3.4–5)
ALBUMIN/GLOB SERPL: 0.7 {RATIO} (ref 1–2)
ALP LIVER SERPL-CCNC: 66 U/L
ALT SERPL-CCNC: 16 U/L
ANION GAP SERPL CALC-SCNC: 0 MMOL/L (ref 0–18)
AST SERPL-CCNC: 12 U/L (ref 15–37)
BASOPHILS # BLD AUTO: 0.06 X10(3) UL (ref 0–0.2)
BASOPHILS NFR BLD AUTO: 0.6 %
BILIRUB SERPL-MCNC: 0.4 MG/DL (ref 0.1–2)
BILIRUB UR QL: NEGATIVE
BUN BLD-MCNC: 27 MG/DL (ref 7–18)
BUN/CREAT SERPL: 22.3 (ref 10–20)
CALCIUM BLD-MCNC: 8.6 MG/DL (ref 8.5–10.1)
CHLORIDE SERPL-SCNC: 102 MMOL/L (ref 98–112)
CLARITY UR: CLEAR
CO2 SERPL-SCNC: 37 MMOL/L (ref 21–32)
COLOR UR: YELLOW
CREAT BLD-MCNC: 1.21 MG/DL
DEPRECATED RDW RBC AUTO: 57.9 FL (ref 35.1–46.3)
EOSINOPHIL # BLD AUTO: 0.11 X10(3) UL (ref 0–0.7)
EOSINOPHIL NFR BLD AUTO: 1.1 %
ERYTHROCYTE [DISTWIDTH] IN BLOOD BY AUTOMATED COUNT: 14.9 % (ref 11–15)
FLUAV + FLUBV RNA SPEC NAA+PROBE: NEGATIVE
FLUAV + FLUBV RNA SPEC NAA+PROBE: NEGATIVE
GFR SERPLBLD BASED ON 1.73 SQ M-ARVRAT: 60 ML/MIN/1.73M2 (ref 60–?)
GLOBULIN PLAS-MCNC: 4.1 G/DL (ref 2.8–4.4)
GLUCOSE BLD-MCNC: 58 MG/DL (ref 70–99)
GLUCOSE BLDC GLUCOMTR-MCNC: 105 MG/DL (ref 70–99)
GLUCOSE BLDC GLUCOMTR-MCNC: 108 MG/DL (ref 70–99)
GLUCOSE BLDC GLUCOMTR-MCNC: 142 MG/DL (ref 70–99)
GLUCOSE BLDC GLUCOMTR-MCNC: 37 MG/DL (ref 70–99)
GLUCOSE BLDC GLUCOMTR-MCNC: 66 MG/DL (ref 70–99)
GLUCOSE BLDC GLUCOMTR-MCNC: 94 MG/DL (ref 70–99)
GLUCOSE BLDC GLUCOMTR-MCNC: 96 MG/DL (ref 70–99)
GLUCOSE UR-MCNC: NEGATIVE MG/DL
HCT VFR BLD AUTO: 29.2 %
HGB BLD-MCNC: 8.8 G/DL
IMM GRANULOCYTES # BLD AUTO: 0.04 X10(3) UL (ref 0–1)
IMM GRANULOCYTES NFR BLD: 0.4 %
KETONES UR-MCNC: NEGATIVE MG/DL
LEUKOCYTE ESTERASE UR QL STRIP.AUTO: NEGATIVE
LYMPHOCYTES # BLD AUTO: 1.11 X10(3) UL (ref 1–4)
LYMPHOCYTES NFR BLD AUTO: 11.2 %
MCH RBC QN AUTO: 31.5 PG (ref 26–34)
MCHC RBC AUTO-ENTMCNC: 30.1 G/DL (ref 31–37)
MCV RBC AUTO: 104.7 FL
MONOCYTES # BLD AUTO: 0.75 X10(3) UL (ref 0.1–1)
MONOCYTES NFR BLD AUTO: 7.5 %
MRSA DNA SPEC QL NAA+PROBE: NEGATIVE
NEUTROPHILS # BLD AUTO: 7.87 X10 (3) UL (ref 1.5–7.7)
NEUTROPHILS # BLD AUTO: 7.87 X10(3) UL (ref 1.5–7.7)
NEUTROPHILS NFR BLD AUTO: 79.2 %
NITRITE UR QL STRIP.AUTO: NEGATIVE
OSMOLALITY SERPL CALC.SUM OF ELEC: 291 MOSM/KG (ref 275–295)
PH UR: 6.5 [PH] (ref 5–8)
PLATELET # BLD AUTO: 245 10(3)UL (ref 150–450)
POTASSIUM SERPL-SCNC: 4.1 MMOL/L (ref 3.5–5.1)
PROT SERPL-MCNC: 6.8 G/DL (ref 6.4–8.2)
PROT UR-MCNC: NEGATIVE MG/DL
RBC # BLD AUTO: 2.79 X10(6)UL
RBC #/AREA URNS AUTO: >10 /HPF
RBC #/AREA URNS AUTO: >10 /HPF
RSV RNA SPEC NAA+PROBE: NEGATIVE
SARS-COV-2 RNA RESP QL NAA+PROBE: NOT DETECTED
SODIUM SERPL-SCNC: 139 MMOL/L (ref 136–145)
SP GR UR STRIP: 1.01 (ref 1–1.03)
UROBILINOGEN UR STRIP-ACNC: 0.2
WBC # BLD AUTO: 9.9 X10(3) UL (ref 4–11)

## 2023-01-31 PROCEDURE — 99221 1ST HOSP IP/OBS SF/LOW 40: CPT | Performed by: HOSPITALIST

## 2023-01-31 PROCEDURE — 71045 X-RAY EXAM CHEST 1 VIEW: CPT | Performed by: EMERGENCY MEDICINE

## 2023-01-31 RX ORDER — NICOTINE POLACRILEX 4 MG
30 LOZENGE BUCCAL
Status: DISCONTINUED | OUTPATIENT
Start: 2023-01-31 | End: 2023-02-02

## 2023-01-31 RX ORDER — ENOXAPARIN SODIUM 150 MG/ML
40 INJECTION SUBCUTANEOUS DAILY
COMMUNITY
End: 2023-02-02

## 2023-01-31 RX ORDER — ALBUTEROL SULFATE 90 UG/1
1 AEROSOL, METERED RESPIRATORY (INHALATION) EVERY 4 HOURS PRN
Status: DISCONTINUED | OUTPATIENT
Start: 2023-01-31 | End: 2023-02-02

## 2023-01-31 RX ORDER — DEXTROSE MONOHYDRATE 25 G/50ML
50 INJECTION, SOLUTION INTRAVENOUS ONCE
Status: COMPLETED | OUTPATIENT
Start: 2023-01-31 | End: 2023-01-31

## 2023-01-31 RX ORDER — DEXTROSE MONOHYDRATE 25 G/50ML
50 INJECTION, SOLUTION INTRAVENOUS
Status: DISCONTINUED | OUTPATIENT
Start: 2023-01-31 | End: 2023-02-02

## 2023-01-31 RX ORDER — ACETAMINOPHEN 500 MG
500 TABLET ORAL EVERY 6 HOURS PRN
Status: DISCONTINUED | OUTPATIENT
Start: 2023-01-31 | End: 2023-01-31

## 2023-01-31 RX ORDER — CHOLECALCIFEROL (VITAMIN D3) 125 MCG
1000 CAPSULE ORAL DAILY
Status: DISCONTINUED | OUTPATIENT
Start: 2023-02-01 | End: 2023-02-02

## 2023-01-31 RX ORDER — ONDANSETRON 2 MG/ML
4 INJECTION INTRAMUSCULAR; INTRAVENOUS EVERY 6 HOURS PRN
Status: DISCONTINUED | OUTPATIENT
Start: 2023-01-31 | End: 2023-02-02

## 2023-01-31 RX ORDER — BISACODYL 10 MG
10 SUPPOSITORY, RECTAL RECTAL
Status: DISCONTINUED | OUTPATIENT
Start: 2023-01-31 | End: 2023-02-02

## 2023-01-31 RX ORDER — ACETAMINOPHEN 500 MG
500 TABLET ORAL EVERY 4 HOURS PRN
Status: DISCONTINUED | OUTPATIENT
Start: 2023-01-31 | End: 2023-02-02

## 2023-01-31 RX ORDER — MAGNESIUM OXIDE 400 MG (241.3 MG MAGNESIUM) TABLET
1 TABLET NIGHTLY
Status: DISCONTINUED | OUTPATIENT
Start: 2023-01-31 | End: 2023-02-02

## 2023-01-31 RX ORDER — DEXTROSE MONOHYDRATE 100 MG/ML
1000 INJECTION, SOLUTION INTRAVENOUS ONCE
Status: COMPLETED | OUTPATIENT
Start: 2023-01-31 | End: 2023-01-31

## 2023-01-31 RX ORDER — PANTOPRAZOLE SODIUM 40 MG/1
40 TABLET, DELAYED RELEASE ORAL
Status: DISCONTINUED | OUTPATIENT
Start: 2023-02-01 | End: 2023-02-02

## 2023-01-31 RX ORDER — POLYETHYLENE GLYCOL 3350 17 G/17G
17 POWDER, FOR SOLUTION ORAL DAILY PRN
Status: DISCONTINUED | OUTPATIENT
Start: 2023-01-31 | End: 2023-02-02

## 2023-01-31 RX ORDER — DEXTROSE MONOHYDRATE 100 MG/ML
INJECTION, SOLUTION INTRAVENOUS CONTINUOUS
Status: DISCONTINUED | OUTPATIENT
Start: 2023-01-31 | End: 2023-02-02

## 2023-01-31 RX ORDER — NICOTINE POLACRILEX 4 MG
15 LOZENGE BUCCAL
Status: DISCONTINUED | OUTPATIENT
Start: 2023-01-31 | End: 2023-02-02

## 2023-01-31 RX ORDER — IPRATROPIUM BROMIDE AND ALBUTEROL SULFATE 2.5; .5 MG/3ML; MG/3ML
3 SOLUTION RESPIRATORY (INHALATION) EVERY 4 HOURS PRN
Status: DISCONTINUED | OUTPATIENT
Start: 2023-01-31 | End: 2023-02-02

## 2023-01-31 RX ORDER — DOCUSATE SODIUM 100 MG/1
100 CAPSULE, LIQUID FILLED ORAL 2 TIMES DAILY PRN
Status: DISCONTINUED | OUTPATIENT
Start: 2023-01-31 | End: 2023-02-02

## 2023-01-31 RX ORDER — BENZONATATE 100 MG/1
200 CAPSULE ORAL 3 TIMES DAILY PRN
Status: DISCONTINUED | OUTPATIENT
Start: 2023-01-31 | End: 2023-02-02

## 2023-01-31 RX ORDER — METOCLOPRAMIDE HYDROCHLORIDE 5 MG/ML
10 INJECTION INTRAMUSCULAR; INTRAVENOUS EVERY 8 HOURS PRN
Status: DISCONTINUED | OUTPATIENT
Start: 2023-01-31 | End: 2023-02-02

## 2023-01-31 RX ORDER — HEPARIN SODIUM 5000 [USP'U]/ML
5000 INJECTION, SOLUTION INTRAVENOUS; SUBCUTANEOUS EVERY 12 HOURS SCHEDULED
Status: DISCONTINUED | OUTPATIENT
Start: 2023-01-31 | End: 2023-02-02

## 2023-01-31 RX ORDER — ATORVASTATIN CALCIUM 40 MG/1
40 TABLET, FILM COATED ORAL NIGHTLY
Status: DISCONTINUED | OUTPATIENT
Start: 2023-01-31 | End: 2023-02-02

## 2023-01-31 NOTE — ED INITIAL ASSESSMENT (HPI)
Pt to ED via EMS with c/o hypoglycemia. Initial call from the NH from weakness and SOB. Upon EMS arrival pt found to be hypoglycemic with a BS of 31. Pt given 100ml of D10 with a result of 94 blood sugar upon arrival to the ED.

## 2023-02-01 LAB
ANION GAP SERPL CALC-SCNC: 2 MMOL/L (ref 0–18)
ATRIAL RATE: 71 BPM
BASOPHILS # BLD AUTO: 0.05 X10(3) UL (ref 0–0.2)
BASOPHILS NFR BLD AUTO: 0.6 %
BUN BLD-MCNC: 20 MG/DL (ref 7–18)
BUN/CREAT SERPL: 21.3 (ref 10–20)
CALCIUM BLD-MCNC: 8.4 MG/DL (ref 8.5–10.1)
CHLORIDE SERPL-SCNC: 102 MMOL/L (ref 98–112)
CO2 SERPL-SCNC: 37 MMOL/L (ref 21–32)
CREAT BLD-MCNC: 0.94 MG/DL
DEPRECATED RDW RBC AUTO: 56.4 FL (ref 35.1–46.3)
EOSINOPHIL # BLD AUTO: 0.11 X10(3) UL (ref 0–0.7)
EOSINOPHIL NFR BLD AUTO: 1.4 %
ERYTHROCYTE [DISTWIDTH] IN BLOOD BY AUTOMATED COUNT: 14.8 % (ref 11–15)
GFR SERPLBLD BASED ON 1.73 SQ M-ARVRAT: 81 ML/MIN/1.73M2 (ref 60–?)
GLUCOSE BLD-MCNC: 105 MG/DL (ref 70–99)
GLUCOSE BLDC GLUCOMTR-MCNC: 111 MG/DL (ref 70–99)
GLUCOSE BLDC GLUCOMTR-MCNC: 113 MG/DL (ref 70–99)
GLUCOSE BLDC GLUCOMTR-MCNC: 116 MG/DL (ref 70–99)
GLUCOSE BLDC GLUCOMTR-MCNC: 145 MG/DL (ref 70–99)
GLUCOSE BLDC GLUCOMTR-MCNC: 157 MG/DL (ref 70–99)
GLUCOSE BLDC GLUCOMTR-MCNC: 85 MG/DL (ref 70–99)
GLUCOSE BLDC GLUCOMTR-MCNC: 95 MG/DL (ref 70–99)
HCT VFR BLD AUTO: 26.3 %
HGB BLD-MCNC: 7.8 G/DL
IMM GRANULOCYTES # BLD AUTO: 0.03 X10(3) UL (ref 0–1)
IMM GRANULOCYTES NFR BLD: 0.4 %
LYMPHOCYTES # BLD AUTO: 1.1 X10(3) UL (ref 1–4)
LYMPHOCYTES NFR BLD AUTO: 13.9 %
MCH RBC QN AUTO: 30.6 PG (ref 26–34)
MCHC RBC AUTO-ENTMCNC: 29.7 G/DL (ref 31–37)
MCV RBC AUTO: 103.1 FL
MONOCYTES # BLD AUTO: 0.73 X10(3) UL (ref 0.1–1)
MONOCYTES NFR BLD AUTO: 9.2 %
NEUTROPHILS # BLD AUTO: 5.91 X10 (3) UL (ref 1.5–7.7)
NEUTROPHILS # BLD AUTO: 5.91 X10(3) UL (ref 1.5–7.7)
NEUTROPHILS NFR BLD AUTO: 74.5 %
OSMOLALITY SERPL CALC.SUM OF ELEC: 295 MOSM/KG (ref 275–295)
P-R INTERVAL: 232 MS
PLATELET # BLD AUTO: 204 10(3)UL (ref 150–450)
POTASSIUM SERPL-SCNC: 4.1 MMOL/L (ref 3.5–5.1)
Q-T INTERVAL: 386 MS
QRS DURATION: 100 MS
QTC CALCULATION (BEZET): 419 MS
R AXIS: -46 DEGREES
RBC # BLD AUTO: 2.55 X10(6)UL
SODIUM SERPL-SCNC: 141 MMOL/L (ref 136–145)
T AXIS: 63 DEGREES
VENTRICULAR RATE: 71 BPM
WBC # BLD AUTO: 7.9 X10(3) UL (ref 4–11)

## 2023-02-01 PROCEDURE — 99222 1ST HOSP IP/OBS MODERATE 55: CPT | Performed by: INTERNAL MEDICINE

## 2023-02-01 PROCEDURE — 99232 SBSQ HOSP IP/OBS MODERATE 35: CPT | Performed by: HOSPITALIST

## 2023-02-01 NOTE — CM/SW NOTE
02/01/23 1000   CM/ Referral Data   Referral Source    Reason for Referral Discharge planning   Pertinent Medical Hx   Does patient have an established PCP? Yes   Significant Past Medical/Mental Health Hx recent hospital   Patient Info   Advanced directives? Yes  (Sister POA)   Patient's Current Mental Status at Time of Assessment Alert   Patient's [address]   Number of Levels in Home 1   Number of Stair in Home 3   Patient lives with Alone   Patient Status Prior to Admission   Independent with ADLs and Mobility No  (since last 2 hospitalizations needs assistance)   Pt. requires assistance with Ambulating;Meals; Bathing;Housework;Driving;Dressing;Medications; Toileting   Services in place prior to admission Other (comment)  ([facility] came from [facility])   Discharge Needs   Anticipated D/C needs Subacute rehab   Services Requested   PASRR Level 1 Submitted No - Current within 90 days       Patient agreeable to return to [facility] at [facility]. Has had 3 recent hospitalizations. Prior to [facility], patient was at [facility]. PLAN:  [facility] most likely, okay to return to [facility]. CM to request PT and OT evaluations if not already ordered. Will need evaluations to return to [facility]. / to remain available for support and/or discharge planning.       Alverto SOSAN RN [address]  RN Case Manager  [phone]

## 2023-02-01 NOTE — CM/SW NOTE
Per patient, his sister is requesting another [facility]. Patient is okay with [facility]. CM unable to reach sister via phone. Requested RN contact CM when sister is in visiting. Alternate [facility] referrals sent while waiting to discuss discharge planning with Artist Mask' sister. PASSR completed. / to remain available for support and/or discharge planning.      Radha Harrington MBA BSN RN [address]  RN Case Manager  [phone]

## 2023-02-01 NOTE — PLAN OF CARE
Patient is 96% on 2L O2 at rest, 91% at rest on room air, With activity pt is 96% on 2L O2 and 89% on RA with activity.

## 2023-02-01 NOTE — H&P
[facility]    PATIENT'S NAME: Aric Dowell   ATTENDING PHYSICIAN: Clair Rajput MD   PATIENT ACCOUNT#:   [de-identified]    LOCATION:  [place] [address]  MEDICAL RECORD #:   [id number]       YOB: 1940  ADMISSION DATE:       01/31/2023    HISTORY AND PHYSICAL EXAMINATION    CHIEF COMPLAINT:  Hypoglycemia. HISTORY OF PRESENT ILLNESS:  Patient is an 81-year-old  male who was found unresponsive at the nursing facility and has low blood glucose reportedly in the mid 30s. He was given D50 by paramedics and brought into the emergency department for evaluation. Repeat blood glucose 94. CBC showed hemoglobin of 8.8 which is at baseline. Otherwise, chemistry is still pending. GeneXpert viral panel is still pending. Chest x-ray showed no acute findings. PAST MEDICAL HISTORY:  Non-small cell lung cancer right upper lobe; squamous cell carcinoma with metastatic disease to the left lung and left sixth rib, started empirically on single agent Keytruda with good response on imaging studies, consolidative radiation therapy, SBRT in the future. He had chronic obstructive pulmonary disease, coronary artery disease status post single vessel PCI, ischemic cardiomyopathy status post AICD with normalization of his ejection fraction, chronic renal insufficiency stage 3, hypertension, hyperlipidemia, generalized osteoarthritis, anemia of chronic kidney disease. PAST SURGICAL HISTORY:  Remote appendectomy. MEDICATIONS:  Please see medication reconciliation list.      ALLERGIES:  No known drug allergies. FAMILY HISTORY:  Father had cerebrovascular accident and hypertension. Mother had heart failure. SOCIAL HISTORY:  Extensive tobacco use history. No current tobacco, alcohol, or drug use. Resides in a nursing facility. Requires assistance in basic activities of daily living. REVIEW OF SYSTEMS:  Patient said he feels fatigued but better.   He denies any recent fever, chills, dysuria. He does have chronic shortness of breath. He said he slept okay last night, woke up this morning and felt okay. He was given his morning medications shortly after his family was in visitation with him and they noted that he is drowsy, shortly after he became unresponsive. Patient denies any diabetes and he does not take any diabetes medications. PHYSICAL EXAMINATION:    GENERAL:  Alert and oriented to time, place, and person. No acute distress. VITAL SIGNS:  Temperature 97.7, pulse 74, respiratory rate 20, blood pressure 138/62, pulse ox 95% on 2L nasal cannula oxygen. HEENT:  Atraumatic. Oropharynx clear. Dry mucous membranes. Ears, nose normal.  Eyes:  Anicteric sclerae. NECK:  Supple. No lymphadenopathy. Trachea midline. Full range of motion. LUNGS:  Clear to auscultation bilaterally. Diminished breathing sounds both lung bases. HEART:  Regular rate, rhythm. S1 and S2 auscultated. No murmur. ABDOMEN:  Soft, nondistended. No tenderness. Positive bowel sounds. EXTREMITIES:  Edema +2 both legs. No clubbing or cyanosis. NEUROLOGIC:  Motor and sensory intact. ASSESSMENT AND PLAN:    1. Unexplained hypoglycemia. No clear evidence of infection severe enough to cause hypoglycemia nor patient takes any medications for diabetes. Possibility of medication error still in the differential.  2.   Lung cancer as outlined above. 3.   Chronic obstructive pulmonary disease. 4.   Chronic kidney disease stage 3.  5.   Anemia of chronic kidney disease and chronic malignancy. Patient will be admitted to general medical floor for observation. Continue to monitor his Accu-Cheks closely. Further recommendations to follow.      Dictated By Kalina Tejada MD  d: 01/31/2023 17:36:06  t: 01/31/2023 18:07:06  Job [id number]  /

## 2023-02-01 NOTE — ED QUICK NOTES
Orders for admission, patient is aware of plan and ready to go upstairs. Any questions, please call ED RN Kathleen at extension [phone]. Patient Covid vaccination status: Fully vaccinated     COVID Test Ordered in ED: SARS-CoV-2/Flu A and B/RSV by PCR (GeneXpert)    COVID Suspicion at Admission: N/A    Running Infusions:  D10 @ 75ml/hr    Mental Status/LOC at time of transport: Aox4    Other pertinent information:   CIWA score: N/A   NIH score:  N/A        Pt from [facility] with c/o hypoglycemia.  Pt AOx4, incontinent, oxygen dependent @ 2-4L NC.

## 2023-02-01 NOTE — CM/SW NOTE
Spoke with sister Raven Bartholomew several times this afternoon with patient's approval.  Patient defers to his POA sister for dc planning. CM explained that referrals were sent to several SARs per her request to look for alternate locations for IP SA rehab. The plan after current [facility] stay was for patient to transition to prison at [facility]. [facility] does not have a [facility] bed at this time. It is anticipated that patient will be medically ready for discharge tomorrow. Discussed with patient's sister the possibility for [facility] to transition to HEATH at discharge with Home Health therapy set up. [facility] uses [facility]. Dr. Arely Wong and family are agreeable to [facility] with home health threapy at discharge tomorrow.    -[facility] will have an AL RN come out to do an on-site assessment on 2/2 during the am.  -Sister Raven Bartholomew will fill out paperwork required by [address] sent to [facility] for Dr. Arely Wong to sign. CM will provide paperwork to MD in the am of 2/2 for signature.  -Chest x-ray sent to [facility] in lieu of TB skin test.  -Referral sent to [facility] at [facility] for [facility], PT and OT at discharge.  -[facility] is current O2 provider. CM will coordinate transfer of home equipment. If approved by [facility], patient will need to transfer by 4pm on 2/2/23. Will require an ambulance for transport due to O2 needs. [address] reserved per family's decision as a back-up if patient is not accepted for [facility].     Frankey Moloney MBA BSN RN [address]  RN Case Manager  [phone]

## 2023-02-02 VITALS
RESPIRATION RATE: 20 BRPM | DIASTOLIC BLOOD PRESSURE: 61 MMHG | TEMPERATURE: 98 F | SYSTOLIC BLOOD PRESSURE: 138 MMHG | BODY MASS INDEX: 28 KG/M2 | OXYGEN SATURATION: 97 % | WEIGHT: 209.44 LBS | HEART RATE: 74 BPM

## 2023-02-02 LAB
ANION GAP SERPL CALC-SCNC: 1 MMOL/L (ref 0–18)
BASOPHILS # BLD AUTO: 0.05 X10(3) UL (ref 0–0.2)
BASOPHILS NFR BLD AUTO: 0.7 %
BUN BLD-MCNC: 20 MG/DL (ref 7–18)
BUN/CREAT SERPL: 17.1 (ref 10–20)
CALCIUM BLD-MCNC: 8.4 MG/DL (ref 8.5–10.1)
CHLORIDE SERPL-SCNC: 101 MMOL/L (ref 98–112)
CO2 SERPL-SCNC: 37 MMOL/L (ref 21–32)
CREAT BLD-MCNC: 1.17 MG/DL
DEPRECATED RDW RBC AUTO: 55.4 FL (ref 35.1–46.3)
EOSINOPHIL # BLD AUTO: 0.16 X10(3) UL (ref 0–0.7)
EOSINOPHIL NFR BLD AUTO: 2.2 %
ERYTHROCYTE [DISTWIDTH] IN BLOOD BY AUTOMATED COUNT: 14.6 % (ref 11–15)
EST. AVERAGE GLUCOSE BLD GHB EST-MCNC: 114 MG/DL (ref 68–126)
GFR SERPLBLD BASED ON 1.73 SQ M-ARVRAT: 62 ML/MIN/1.73M2 (ref 60–?)
GLUCOSE BLD-MCNC: 143 MG/DL (ref 70–99)
GLUCOSE BLDC GLUCOMTR-MCNC: 113 MG/DL (ref 70–99)
GLUCOSE BLDC GLUCOMTR-MCNC: 123 MG/DL (ref 70–99)
GLUCOSE BLDC GLUCOMTR-MCNC: 143 MG/DL (ref 70–99)
GLUCOSE BLDC GLUCOMTR-MCNC: 155 MG/DL (ref 70–99)
HBA1C MFR BLD: 5.6 % (ref ?–5.7)
HCT VFR BLD AUTO: 26.2 %
HGB BLD-MCNC: 7.9 G/DL
IMM GRANULOCYTES # BLD AUTO: 0.03 X10(3) UL (ref 0–1)
IMM GRANULOCYTES NFR BLD: 0.4 %
LYMPHOCYTES # BLD AUTO: 1.28 X10(3) UL (ref 1–4)
LYMPHOCYTES NFR BLD AUTO: 17.8 %
MAGNESIUM SERPL-MCNC: 1.4 MG/DL (ref 1.6–2.6)
MCH RBC QN AUTO: 30.7 PG (ref 26–34)
MCHC RBC AUTO-ENTMCNC: 30.2 G/DL (ref 31–37)
MCV RBC AUTO: 101.9 FL
MONOCYTES # BLD AUTO: 0.69 X10(3) UL (ref 0.1–1)
MONOCYTES NFR BLD AUTO: 9.6 %
NEUTROPHILS # BLD AUTO: 5 X10 (3) UL (ref 1.5–7.7)
NEUTROPHILS # BLD AUTO: 5 X10(3) UL (ref 1.5–7.7)
NEUTROPHILS NFR BLD AUTO: 69.3 %
OSMOLALITY SERPL CALC.SUM OF ELEC: 293 MOSM/KG (ref 275–295)
PHOSPHATE SERPL-MCNC: 2.3 MG/DL (ref 2.5–4.9)
PLATELET # BLD AUTO: 217 10(3)UL (ref 150–450)
POTASSIUM SERPL-SCNC: 4.1 MMOL/L (ref 3.5–5.1)
RBC # BLD AUTO: 2.57 X10(6)UL
SARS-COV-2 RNA RESP QL NAA+PROBE: NOT DETECTED
SODIUM SERPL-SCNC: 139 MMOL/L (ref 136–145)
WBC # BLD AUTO: 7.2 X10(3) UL (ref 4–11)

## 2023-02-02 PROCEDURE — 99239 HOSP IP/OBS DSCHRG MGMT >30: CPT | Performed by: HOSPITALIST

## 2023-02-02 PROCEDURE — 99232 SBSQ HOSP IP/OBS MODERATE 35: CPT | Performed by: INTERNAL MEDICINE

## 2023-02-02 RX ORDER — MAGNESIUM OXIDE 400 MG/1
800 TABLET ORAL ONCE
Status: COMPLETED | OUTPATIENT
Start: 2023-02-02 | End: 2023-02-02

## 2023-02-02 NOTE — CM/SW NOTE
02/02/23 1100   Discharge disposition   Expected discharge disposition [address] Provider   ([facility])   Discharge transportation [facility]       Patient to discharge today to [facility] via ambulance with a pick-up time at 2pm.  Oxygen to be delivered to [facility] by Brother-in-law. [place] [facility] notified of dc. Family and patient aware of plan.     Romulo Holland MBA BSN RN [address]  RN Case Manager  [phone]

## 2023-02-02 NOTE — DISCHARGE SUMMARY
Dc summary#[id number]  > 30 min spent on [address] Discharge Diagnoses: hypoglycemia    Lace+ Score: 79  59-90 High Risk  29-58 Medium Risk  0-28   Low Risk. TCM Follow-Up Recommendation:  LACE 29-58:  Moderate Risk of readmission after discharge from the hospital.tcm fu recommended

## 2023-02-02 NOTE — CM/SW NOTE
Paperwork completed for jail at [facility]. [facility]O will  O2 tank/concentrator and deliver to [facility] at home accepted for [place]- Notified of discharge today.     Melvina Puentes MBA BSN RN [address]  RN Case Manager  [phone]

## 2023-02-02 NOTE — DISCHARGE INSTRUCTIONS
Home health Provider:    [facility]  Phone: [phone]  Fax: [phone] [address], [facility]    Notified of your discharge today. Fu with dr Etelvina Lancaster for labs fu  Please send dnr form     Medication List        CONTINUE taking these medications      acetaminophen 500 MG Tabs  Commonly known as: Tylenol Extra Strength     atorvastatin 40 MG Tabs  Commonly known as: Lipitor     benzonatate 200 MG Caps  Commonly known as: Tessalon  Take 1 capsule (200 mg total) by mouth 3 (three) times daily as needed for cough. bisacodyl 10 MG Supp  Commonly known as: Dulcolax  Place 1 suppository (10 mg total) rectally daily as needed. cyanocobalamin 1000 MCG Tabs  Commonly known as: Vitamin B12     docusate sodium 100 MG Caps  Commonly known as: COLACE  Take 100 mg by mouth 2 (two) times daily as needed for constipation. guaiFENesin 100 MG/5 ML   Commonly known as: Robitussin  Take 10 mL (200 mg total) by mouth every 4 (four) hours as needed. ipratropium-albuterol 0.5-2.5 (3) MG/3ML Soln  Commonly known as: Duoneb  Take 3 mL by nebulization every 4 (four) hours as needed. melatonin 1 MG Tabs  Take 1 tablet (1 mg total) by mouth nightly. Omeprazole 40 MG Cpdr     Polyethylene Glycol 3350 17 g Pack  Commonly known as: MIRALAX  Take 17 g by mouth daily as needed.      ProAir  (90 Base) MCG/ACT Aers  Generic drug: albuterol     Trelegy Ellipta 100-62.5-25 MCG/ACT Aepb  Generic drug: fluticasone-umeclidin-vilant  INHALE 1 PUFF INTO THE LUNGS DAILY     Vitamin D 50 MCG (2000 UT) Tabs            STOP taking these medications      enoxaparin 150 MG/ML Sosy  Commonly known as: Lovenox     lactulose 20 GM/30ML Soln  Commonly known as: ENULOSE     Medihoney Wound/Burn Dressing Gel     predniSONE 10 MG Tabs  Commonly known as: Deltasone

## 2023-02-03 NOTE — DISCHARGE SUMMARY
[facility]    PATIENT'S NAME: Gi Foster   ATTENDING PHYSICIAN: Laila Penn MD   PATIENT ACCOUNT#:   [id number]    LOCATION:  [address] [place] [address] #:   [id number]       YOB: 1940  ADMISSION DATE:       01/31/2023      DISCHARGE DATE:  02/02/2023    DISCHARGE SUMMARY      About 35 minutes were spent preparing this discharge. Discussed with patient's family in the room with the patient's permission, case management and preparing the discharge. DISCHARGE DIAGNOSIS:  Hypoglycemia, perhaps from inadvertent administration of antihyperglycemic medications intended for another patient. HISTORY AND HOSPITAL COURSE:  This is a very pleasant 32-year-old white male well known to me from previous hospitalizations who presents with a history of having a low blood sugar. He came to the hospital with a blood sugar in the 30s, transferred from, I believe, a subacute rehab facility. The patient lives at [facility]. He may or may not have received another patient's medication which lowered his blood sugar. Sulfonylurea screen pending. He improved and he was discharged to assisted living at [facility] after much work by Case Management and his sugars remained normal and he was alert, bright and looked well. PHYSICAL EXAMINATION:    VITAL SIGNS:  On discharge temperature is 97.9, pulse is 74, respiratory rate 20, blood pressure is 138/61, 97% on 2L which is chronic for him. LUNGS:  Occasional rhonchi. HEART:  Normal S1, S2. No S3. There is soft murmur, systolic. ABDOMEN:  Soft, nontender. EXTREMITIES:  No significant edema. NEUROLOGIC:  He is alert, oriented, friendly, and cooperative. LABORATORY STUDIES:  Please see chart. ASSESSMENT AND PLAN:    1. Hypoglycemia, perhaps from inadvertent medications being administered. Hopefully, he will do well. His medications will be handled by a [facility].   This was confirmed by the representative in the room with the family at the time I walked in.  2.   Lung cancer as per Dr. John Durant and Dr. Vanna Arenas. 3.   Chronic obstructive pulmonary disease. 4.   Chronic kidney disease stage 3.  5.   Anemia of chronic kidney disease and chronic neoplastic disease. 6.   Chronic hypoxemic respiratory failure, on oxygen. CONDITION ON DISCHARGE:  Stable for [employer]. CODE STATUS:  DNAR [employer]. ACTIVITY:  As tolerated. DIET:  As tolerated. FOLLOWUP:  With Dr. Matty Ulrich or his designate 1 to 2 days with labs at their discretion, Dr. John Durant in 1 month, rehab physician 1 to 2 days. DISCHARGE MEDICATIONS:    1.   Vitamin D 2000 units daily. 2.   Tylenol 500 mg every 6 hours as needed. 3.   Atorvastatin 40 mg nightly. Watch for muscle weakness. 4.   Tessalon Perles 200 mg 3 times a day. 5.   Dulcolax suppository daily as needed. 6.   Colace 100 mg p.o. b.i.d. p.r.n. constipation. 7.   Robitussin 10 mL every 4 hours as needed for cough. 8.   DuoNeb every 4 hours as needed. 9.   Melatonin 1 mg nightly. 10.   Omeprazole 40 mg daily. 11.   MiraLAX 17 g daily. 12.   ProAir 2 puffs every 4 hours as needed. 13.   Trelegy Ellipta 1 puff daily. 14.   B12 at 1000 mcg daily. RISK OF READMISSION:  Moderate. TCM followup is recommended. Dictated By Martha Ambrose.  MD Fili  d: 02/02/2023 16:50:49  t: 02/02/2023 19:44:25  Job [id number]  [employer]/

## 2023-02-12 ENCOUNTER — TELEPHONE (OUTPATIENT)
Dept: ENDOCRINOLOGY CLINIC | Facility: CLINIC | Age: 83
End: 2023-02-12

## 2023-02-13 NOTE — TELEPHONE ENCOUNTER
Patient was admitted for hypoglycemia ( not a diabetic)   We suspected he was given Dm meds of his room mate a the facility he is at   We had done a screen to measure levels of  diabetic medication belonging to the class Sulfonylureas  This is negative  Hence it remains unclear why he had the low BG  It could be related to another medication   It is important that he let the nursing staff know immediately  if he has any symptoms of low BG ( can please review again)   Eat three regular meals and snacks in between meals

## 2023-02-15 NOTE — TELEPHONE ENCOUNTER
Spoke to patient's sister to relay message below - she stated understanding and stated nursing staff has been advised to watch for signs of hypoglycemia and make sure patient eats 3 meals a day and snacks between meals  Patient's sister stated understanding to contact clinic with questions

## 2023-02-21 PROBLEM — I46.9 CARDIAC ARREST (HCC): Status: ACTIVE | Noted: 2023-01-01

## 2023-02-21 NOTE — ED QUICK NOTES
Dr. China Noel at bedside speaking with Maureen Wiley' sister, Luis Tracey, and her  regarding the course of treatment and plan of care. Luis Kyung states that Maureen Wiley would not want further CPR or vasopressors and would want to focus on comfort care at this point. Offered to call hospital  to talk to them. Luis Cos did not want to talk to  at this time.

## 2023-02-21 NOTE — ED QUICK NOTES
Verbal order from Dr Rosalind Saunders to hold off on sepsis bolus for now. Verbal order to give IV fluids infusion 100ml/hr. Infusion started.

## 2023-02-21 NOTE — ED QUICK NOTES
Dr. Makayla Kothari at bedside. Received verbal order for in line duoneb and to advance ETT 1cm. ETT advanced 1cm by Erwin SÁNCHEZ

## 2023-02-21 NOTE — PROGRESS NOTES
02/21/23 0605   Clinical Encounter Type   Visited With Patient not available;Health care provider   Crisis Visit ED  (Full Arrest)   Referral To    Taxonomy   Intended Effects Convey a calming presence   Interventions Silent prayer         Discussion:  responded to Full Arrest. When  arrived, no loved ones were present. Patient remained occupied with care team and unavailable.  spoke with one of patient's nurses and made them aware of the 's availability and requested that they call when the family is present and if they desire or would benefit for  support. Chaplains are available for a follow-up visit PRN and can be reached at [phone]. Margy Peterson M.Div, Chaplain Resident.

## 2023-02-21 NOTE — ED QUICK NOTES
Kathy Urbina' sister, Bhumi rOta, and her  left ED at this time. Bhumi Orta would like to receive a call at number on file when Kathy Urbina is transferred to his inpatient bed.

## 2023-02-21 NOTE — ED INITIAL ASSESSMENT (HPI)
Pt presents to ED via [facility] for full arrest from [facility]. Per EMS, last well known time of patient was 0500 per NH staff. CPR started by EMS. 1 epi given and 2 of narcan given in route by EMS.

## 2023-02-21 NOTE — PROGRESS NOTES
02/21/23 0617   Settings   FiO2 (%) (S)  50 %   Resp Rate (Set) (S)  18   Vt (Set, mL) 500 mL   PEEP/CPAP (cm H2O) 5 cm H20

## 2023-02-21 NOTE — ED QUICK NOTES
Dr Georgette Roper spoke with (sister) Stacy Wright. No chest compression if heart stops again. Sister is on her way. Per Dr Rivera Reason no chest compression if heart stops.

## 2023-02-21 NOTE — ED QUICK NOTES
Candice Pizza' lungs still diminished with expiratory wheezing. Dr. Danica Dumont made aware. Received telephone order for ipratropium-albuterol (Duoneb) 0.5-2.5 (3) MG/3ML inhalation solution 3 mL now and ipratropium-albuterol (Duoneb) 0.5-2.5 (3) MG/3ML inhalation solution 3 mL q 6hrs. He states he will put order for IVP solu medrol.

## 2023-02-21 NOTE — CM/SW NOTE
Readmission    Pt recently discharged from [facility] on 2/2 to [address] [facility] with [place], [address] at night     Pt with HX of [facility] 12/23/22, [facility]    PLAN: pending medical course - TBD     SW remains available for support and/or discharge planning. Please do not hesitate to call/chat SW if further DC needs arise.      Melina De La Vega MSW, [place], [place]   Ext [phone]

## 2023-02-21 NOTE — PLAN OF CARE
Nany Lentz Patient Status:  Emergency    1940 MRN [id number]   Location [address] Attending No att. providers found   Hosp Day # 0 PCP Marisela Gilbert MD       Cardiology Nocturnal APN Note    Page Received: [address] Russo    HPI:     Patient is a 81year old male with PMH of non-small cell lung cancer, squamous cell carcinoma with metastatic disease, COPD, CAD status post PCI, ischemic cardiomyopathy status post AICD, CKD stage III, hypertension, hyperlipidemia, and anemia of chronic kidney disease who presented to the ED in cardiac arrest.  Per ED physician, patient found down at assisted living facility. Was noted to be in ventricular tachycardia on arrival to ED and was given epinephrine and amiodarone. ROSC was achieved and patient is now in wide-complex ventricular rhythm that is paced. Per ED physician, patient down approximately 20 minutes. [facility] consulted for cardiac arrest.  On-call cardiologist notified. Echocardiogram completed on 2021 shows EF 55%, normal left ventricular cavity size, unable to assess wall motion abnormality. HPI obtained via chart review and information provided by ED physician. Vital Signs:   Vitals History 2023   /115 -   BP Location - -   Patient Position - -   Cuff Size - -   Pulse 147 70   Resp 15 16   Temp - -   SpO2 96 96   Weight - -   Height - -   BODY MASS INDEX - -        Labs:    Pending    Diagnostics:     Pending    Allergies:  No Known Allergies    Medications:  No current facility-administered medications for this encounter. Assessment/Plan:    - Further cardiology management pending clinical course  - Formal Cardiology consult to follow. Lali Romano, [address]  2023  5:58 AM

## 2023-02-21 NOTE — ICD/PM
Remote Cardiac Device Interrogation    Device : [employer]    Device type: dual lead pacemaker    Battery longevity: 4 year(s)    Stored events: None    AF Phelps: < 0.1%    DONNA Peña  2/21/2023

## 2023-02-21 NOTE — RESPIRATORY THERAPY NOTE
Patient received in ED for cardiac arrest. Intubated and placed on ventilator AC/VC 18/500/+5/50%. ABG drawn showing acute respiratory acidosis. ET size 7.0 secured at 24 then advanced to 26 cm advised per radiologist. 2x DuoNeb administered. Transported from ED 27 to  without incident. Respiratory status stable and tolerating ventilator well, suctioned as needed.       Latest Reference Range & Units 02/21/23 05:59   ABG PH 7.35 - 7.45  7.19 (LL)   ABG PCO2 35 - 45 mm Hg 89 (HH)   ABG PO2 80 - 100 mm Hg 404 (H)   ABG HCO3 21.0 - 27.0 mEq/L 28.1 (H)   ABG O2 SATURATION 94.0 - 100.0 % >99.0   Blood Gas Base Excess -2.0 - 2.0 mmol/L 4.1 (H)   Lactic Acid (Blood Gas) 0.5 - 2.0 mmol/L 4.3 (HH)   Blood Gas Vent Mode  A/C   Blood Gas Respiration Rate BPM 16   Oxygen Content 15.0 - 23.0 Vol% 13.9 (L)   FiO2  100.00   SAMPLE SITE  Right Radial   TIDAL VOLUME mL 500.0   PEEP cm H2O 5.0   (LL): Data is critically low  LONG TERM ACUTE [facility]): Data is critically high  (H): Data is abnormally high  (L): Data is abnormally low

## 2023-02-21 NOTE — ED QUICK NOTES
Assumed care of Kain Grajeda from [place]/Meghan RNs. Kain Grajeda is a post ROSC patient on ventilator. He currently has 0.9NS infusing at 100ml/hr and 3.375gm Zosyn IV. He is intubated, on ventilator, 50% FIO2, breathing over the ventilator. No purposeful movements at this time. Dr. Siva Hearn was made aware of BP, 500ml 0.9NS bolus ordered. Will continue to closely monitor.

## 2023-02-21 NOTE — ED QUICK NOTES
Orders for admission, patient is aware of plan and ready to go upstairs. Any questions, please call ED RN Alfreda Suresh at extension [phone].      Patient Covid vaccination status: Fully vaccinated     COVID Test Ordered in ED: Rapid SARS-CoV-2 by PCR    COVID Suspicion at Admission: PENDING    Running Infusions: 0.9 NS    Mental Status/LOC at time of transport: INTUBATED    Other pertinent information: INTUBATED, OG IN PLACE, 18G IV L + R AC, 20G L UPPER ARM, FAMILY NOTIFIED --> SISTER EN ROUTE    CIWA score: N/A   NIH score:  N/A

## 2023-02-22 PROBLEM — Z51.5 PALLIATIVE CARE ENCOUNTER: Status: ACTIVE | Noted: 2023-01-01

## 2023-02-22 PROBLEM — Z71.89 ADVANCE CARE PLANNING: Status: ACTIVE | Noted: 2023-01-01

## 2023-02-22 PROBLEM — Z71.89 GOALS OF CARE, COUNSELING/DISCUSSION: Status: ACTIVE | Noted: 2023-01-01

## 2023-02-22 NOTE — PLAN OF CARE
Problem: Patient Centered Care  Goal: Patient preferences are identified and integrated in the patient's plan of care  Description: Interventions:  - What would you like us to know as we care for you? From nursing home assisted living  - Provide timely, complete, and accurate information to patient/family  - Incorporate patient and family knowledge, values, beliefs, and cultural backgrounds into the planning and delivery of care  - Encourage patient/family to participate in care and decision-making at the level they choose  - Honor patient and family perspectives and choices  Outcome: Progressing     Problem: PAIN - ADULT  Goal: Verbalizes/displays adequate comfort level or patient's stated pain goal  Description: INTERVENTIONS:  - Encourage pt to monitor pain and request assistance  - Assess pain using appropriate pain scale  - Administer analgesics based on type and severity of pain and evaluate response  - Implement non-pharmacological measures as appropriate and evaluate response  - Consider cultural and social influences on pain and pain management  - Manage/alleviate anxiety  - Utilize distraction and/or relaxation techniques  - Monitor for opioid side effects  - Notify MD/LIP if interventions unsuccessful or patient reports new pain  - Anticipate increased pain with activity and pre-medicate as appropriate  Outcome: Progressing     Problem: Patient/Family Goals  Goal: Patient/Family Long Term Goal  Description: Patient's Long Term Goal: comfort    Interventions:  - assess for signs of pain or discomfort  - See additional Care Plan goals for specific interventions  Outcome: Progressing  Goal: Patient/Family Short Term Goal  Description: Patient's Short Term Goal: comfort    Interventions:   - assess for signs of pain or discomfort  - See additional Care Plan goals for specific interventions  Outcome: Progressing    Updated sister Charla Jordan via phone. Pt turned Q2 hours, bed in low locked position.  Unable to place canchola catheter due to obstruction encountered about 2 inches into the urethra.

## 2023-02-22 NOTE — PROGRESS NOTES
[facility]    CDI Prediction Tool Protocol (Vancomycin Initiated)    OVP (oral vancomycin prophylaxis) 125 mg PO Daily is being started in this patient based on a score of 14. Score Breakdown:  High risk antibiotic use (5 points)  Malignancy (3 points)     Long term care facility resident (1 point)  Hypoalbuminemia: < 3g/dL (1 point)  Age >/= 80 years (3 points)  Recently hospitalized: within 90 days (1 point)    This patient is currently at high risk for developing CDI due to his/her score being >/=13 points and is being started on prophylactic oral vancomycin to prevent Cdiff. This patient does not have C. difficile infection. All measures taken within this protocol are to decrease the risk of CDI development.     Ofe Huston, PharmD  2/22/2023  6:38 AM  [address] Extension: [phone]

## 2023-02-22 NOTE — PROGRESS NOTES
Pt's vent stated no volumes were being given and that the ET tube was dislodged from 26cm to 18cm. Pt's O2 was 97% at this time. RT Ebenezer Barahona was called to bedside. Dr Samuel Spence was called for possible repositioning of ET tube if family decides. Before fully extubating the pt I called Sarai Meadows and informed her about the event. She decided to go with pt's wishes to be DNR and to be placed on BIPAP moving forward. Pt was fully extubated and placed on BIPAP. Milady Gomes was updated 1hour after extubation for pt's current status.

## 2023-02-22 NOTE — RESPIRATORY THERAPY NOTE
RT called to bedside by nurse after hearing the vent alarm continuously and noticing the tube was 18 @ the lip rather than 26. Upon arrival the nurse had already place a NC on patient and was on the phone with the physician. The tube was still in patient's mouth, 18 @ lip and a large audible leak. No volumes were being returned on vent monitoring. Since the patient is DNR/DNI the nurse did contact the POA regarding the incident and what their options were going forward- if they would like for us to re-insert the ETT or remove the tube completely and keep non-invasive measures for now. The family did agree to not have the tube reinserted. After we removed the tube completely the patient was placed on NIV. All vitals remained stable throughout incident. Respiratory Assessment   Assessment Type Assess only   Respiratory Pattern Accessory muscle use  (NIV mechanical assist)   Chest Assessment Chest expansion symmetrical   Bilateral Breath Sounds Diminished   Additional Assessments   Pulse 71   Resp 22   SpO2 100 %   Position Cross's   Oral Care Mouth suctioned               Plan: We will maintain NIV for the rest of the night as tolerated. Continue Q4 neb treatments.             Ann-Marie Love., RRT-ACCS

## 2023-02-22 NOTE — PLAN OF CARE
Problem: PAIN - ADULT  Goal: Verbalizes/displays adequate comfort level or patient's stated pain goal  Description: INTERVENTIONS:  - Encourage pt to monitor pain and request assistance  - Assess pain using appropriate pain scale  - Administer analgesics based on type and severity of pain and evaluate response  - Implement non-pharmacological measures as appropriate and evaluate response  - Consider cultural and social influences on pain and pain management  - Manage/alleviate anxiety  - Utilize distraction and/or relaxation techniques  - Monitor for opioid side effects  - Notify MD/LIP if interventions unsuccessful or patient reports new pain  - Anticipate increased pain with activity and pre-medicate as appropriate  2/22/2023 0607 by Ayaan Pelletier RN  Outcome: Not Progressing  2/22/2023 0607 by Ayaan Pelletier RN  Outcome: Not Progressing     Problem: CARDIOVASCULAR - ADULT  Goal: Maintains optimal cardiac output and hemodynamic stability  Description: INTERVENTIONS:  - Monitor vital signs, rhythm, and trends  - Monitor for bleeding, hypotension and signs of decreased cardiac output  - Evaluate effectiveness of vasoactive medications to optimize hemodynamic stability  - Monitor arterial and/or venous puncture sites for bleeding and/or hematoma  - Assess quality of pulses, skin color and temperature  - Assess for signs of decreased coronary artery perfusion - ex.  Angina  - Evaluate fluid balance, assess for edema, trend weights  Outcome: Progressing  Goal: Absence of cardiac arrhythmias or at baseline  Description: INTERVENTIONS:  - Continuous cardiac monitoring, monitor vital signs, obtain 12 lead EKG if indicated  - Evaluate effectiveness of antiarrhythmic and heart rate control medications as ordered  - Initiate emergency measures for life threatening arrhythmias  - Monitor electrolytes and administer replacement therapy as ordered  Outcome: Progressing     Problem: RESPIRATORY - ADULT  Goal: Achieves optimal ventilation and oxygenation  Description: INTERVENTIONS:  - Assess for changes in respiratory status  - Assess for changes in mentation and behavior  - Position to facilitate oxygenation and minimize respiratory effort  - Oxygen supplementation based on oxygen saturation or ABGs  - Provide Smoking Cessation handout, if applicable  - Encourage broncho-pulmonary hygiene including cough, deep breathe, Incentive Spirometry  - Assess the need for suctioning and perform as needed  - Assess and instruct to report SOB or any respiratory difficulty  - Respiratory Therapy support as indicated  - Manage/alleviate anxiety  - Monitor for signs/symptoms of CO2 retention  Outcome: Progressing     Pt opens eyes to voice for a couple seconds. Does not make eye contact or follow commands. Afebrile    HR A paced 70s. BP normotensive. Due to displacement of ET tube, pt was extubated and placed on BIPAP. No events of desaturation. Nati Santos, Dr Hardik Rodgers and Dr Al Beth was made aware to these events. Pt straight cath x1 with total of abot 100mL.  No BM

## 2023-02-23 PROBLEM — C34.90 MALIGNANT NEOPLASM OF BRONCHUS AND LUNG (HCC): Status: ACTIVE | Noted: 2023-01-01

## 2023-02-23 NOTE — PLAN OF CARE
Patient A&O x3, speech is still weak and delayed, but patient answers questions and follows commands. He started shift on 4L nc and is now down to 3L. He was put on bibap a little after midnight, but patient became agitated and kept trying to take bipap off and would desat. He appeared to be more comfortable on nasal cannula and saturation was within limits. Patient remains oliguric with 100cc of urine from day 2/22 through night shift 2/23. Fluids were held overnight as patient continued to sound congested and edema in BLE increased. Overall patient seemed comfortable and slept in between care. Problem: CARDIOVASCULAR - ADULT  Goal: Maintains optimal cardiac output and hemodynamic stability  Description: INTERVENTIONS:  - Monitor vital signs, rhythm, and trends  - Monitor for bleeding, hypotension and signs of decreased cardiac output  - Evaluate effectiveness of vasoactive medications to optimize hemodynamic stability  - Monitor arterial and/or venous puncture sites for bleeding and/or hematoma  - Assess quality of pulses, skin color and temperature  - Assess for signs of decreased coronary artery perfusion - ex.  Angina  - Evaluate fluid balance, assess for edema, trend weights  Outcome: Progressing  Goal: Absence of cardiac arrhythmias or at baseline  Description: INTERVENTIONS:  - Continuous cardiac monitoring, monitor vital signs, obtain 12 lead EKG if indicated  - Evaluate effectiveness of antiarrhythmic and heart rate control medications as ordered  - Initiate emergency measures for life threatening arrhythmias  - Monitor electrolytes and administer replacement therapy as ordered  Outcome: Progressing     Problem: RESPIRATORY - ADULT  Goal: Achieves optimal ventilation and oxygenation  Description: INTERVENTIONS:  - Assess for changes in respiratory status  - Assess for changes in mentation and behavior  - Position to facilitate oxygenation and minimize respiratory effort  - Oxygen supplementation based on oxygen saturation or ABGs  - Provide Smoking Cessation handout, if applicable  - Encourage broncho-pulmonary hygiene including cough, deep breathe, Incentive Spirometry  - Assess the need for suctioning and perform as needed  - Assess and instruct to report SOB or any respiratory difficulty  - Respiratory Therapy support as indicated  - Manage/alleviate anxiety  - Monitor for signs/symptoms of CO2 retention  Outcome: Progressing

## 2023-02-23 NOTE — PROGRESS NOTES
[facility] Pharmacy Note:  Renal Adjustment for piperacillin/tazobactam (Jazmyn Solis)    [place] is a 81year old patient who has been prescribed piperacillin/tazobactam (ZOSYN) 4500 mg every 8 hrs. The estimated creatinine clearance is 16.9 mL/min (A) (based on SCr of 3.81 mg/dL (H)). The dose has been adjusted to piperacillin/tazobactam (ZOSYN) 4500 mg every 12 hrs per hospital renal dose adjustment protocol for treatment of pneumonia. Pharmacy will follow and adjust dose as warranted for additional renal function changes.     Thank you,    Yue Klein, PharmD  2/23/2023  12:28 PM

## 2023-02-23 NOTE — CM/SW NOTE
MDO for hospice. [facility]idential hospice is following up with family today. PT rec for [facility]. Referral sent to [employer]. Pt is from [facility]. PLAN: pending [address] remains available for support and/or discharge planning. Please do not hesitate to call/chat SW if further DC needs arise.      Winnie Orosco MSW, [place], [place]   Ext [phone]

## 2023-02-23 NOTE — CM/SW NOTE
Department  notified of request for zachary BUNDY referrals started. Assigned CM/SW to follow up with pt/family on further discharge planning.      Tabatha Kangs   February 23, 2023   14:53

## 2023-02-23 NOTE — HOSPICE RN NOTE
Met with patient family and the patient in his room. Patient is minimally responsive but can occasionally speak a couple of words. Patient has a pmh of Lung Cancer which will be his primary Dx. Patient came to the ER from [facility] and had CPR performed and was intubated. Patient pulled out his own tube and family has decided on comfort which is the patient wish. Writer explained hospice philosophy as well as patient prognosis. Confirmed admission with Dr Jc Roth and Dr Bernadette Tellez.

## 2023-02-23 NOTE — PLAN OF CARE
Patient is A&O x1. Hoarse speech and unable to follow commands. Movement to pain in all ext. All scheduled meds given per [facility]. IVF adjusted and continued per order. External Cath maintained, very minimal urine output this shift. MD aware. Per bladder scan, 30ml- protocol followed. No BM today. Patient turned and repositioned every 2 hrs and as needed. Frequent nursing rounds and safety maintained. Problem: Patient Centered Care  Goal: Patient preferences are identified and integrated in the patient's plan of care  Description: Interventions:  - What would you like us to know as we care for you?  Sister is POA  - Provide timely, complete, and accurate information to patient/family  - Incorporate patient and family knowledge, values, beliefs, and cultural backgrounds into the planning and delivery of care  - Encourage patient/family to participate in care and decision-making at the level they choose  - Honor patient and family perspectives and choices  Outcome: Progressing     Problem: RESPIRATORY - ADULT  Goal: Achieves optimal ventilation and oxygenation  Description: INTERVENTIONS:  - Assess for changes in respiratory status  - Assess for changes in mentation and behavior  - Position to facilitate oxygenation and minimize respiratory effort  - Oxygen supplementation based on oxygen saturation or ABGs  - Provide Smoking Cessation handout, if applicable  - Encourage broncho-pulmonary hygiene including cough, deep breathe, Incentive Spirometry  - Assess the need for suctioning and perform as needed  - Assess and instruct to report SOB or any respiratory difficulty  - Respiratory Therapy support as indicated  - Manage/alleviate anxiety  - Monitor for signs/symptoms of CO2 retention  Outcome: Progressing     Problem: CARDIOVASCULAR - ADULT  Goal: Maintains optimal cardiac output and hemodynamic stability  Description: INTERVENTIONS:  - Monitor vital signs, rhythm, and trends  - Monitor for bleeding, hypotension and signs of decreased cardiac output  - Evaluate effectiveness of vasoactive medications to optimize hemodynamic stability  - Monitor arterial and/or venous puncture sites for bleeding and/or hematoma  - Assess quality of pulses, skin color and temperature  - Assess for signs of decreased coronary artery perfusion - ex.  Angina  - Evaluate fluid balance, assess for edema, trend weights  Outcome: Progressing

## 2023-02-24 NOTE — PROGRESS NOTES
02/24/23 1008   Clinical Encounter Type   Routine Visit Follow-up   Referral From    Sacramental Encounters   Sacrament of Sick-Anointing Family requested anointing       Summary:  submitted request for anointing.     -Scout Jason, Chaplain Resident.

## 2023-02-24 NOTE — PLAN OF CARE
Transfer from [facility] for inpatient hospice. Patient is lethargic and minimally responsive. Morphine gtt infusing. 4L oxygen via NC. Prn oral care provided. Canchola inserted for EOL comfort measures. Blood clots produced; canchola remains in place. Repositioned as tolerated. Comfort and end of life care provided.

## 2023-02-24 NOTE — PROGRESS NOTES
02/24/23 1701   Clinical Encounter Type   Visited With Patient and family together   Referral From Other (Comment)  (Hospice)   Referral To [address] During Treatment Consistently   Taxonomy   Intended Effects Establish rapport and connectedness   Methods Offer emotional support; Offer spiritual/Jew support;Encourage sharing of feelings;Encourage story-telling   Interventions Acknowledge current situation; Active listening;Ask questions to bring forth feelings;[place]       Discussion: Pt seen bedside with two sisters, a brother-in-law, niece and her  present. Pt non-responsive.  offered emotional support; encouraged sharing about pt; provided prayer with family per request. Pt's family seemed to be coping appropriately and at peace with pt's status/ decline. Plan to follow for supportive care as indicated.  follow-up visit available prn and can be contacted at [phone].     David Medina, Chaplain Resident

## 2023-02-24 NOTE — PLAN OF CARE
Patient sleeping between care, arouses to voice and oriented x1-2. Hoarse speech. Morphine gtt initiated. External Cath maintained, very minimal urine output this shift. No BM today. Patient turned and repositioned every 2 hrs and as needed. All comfort measures and safety maintained.

## 2023-02-25 NOTE — SIGNIFICANT EVENT
Pt  at [address]. Resusitation not attempted as pt was DNR.     Time of Death 5    Family Notified Yes  Name and Relation Giselle Perez, Sister     [facility] Notified Yes (get Rep Name and Case Number) #[id number], Mallorie [place]     contacted if applicable N/A (get Name and Badge Number)

## 2023-02-25 NOTE — PROGRESS NOTES
02/25/23 0631   Clinical Encounter Type   Visited With Family   Crisis Visit Death   Referral From Verbal;Nurse   Referral To    Taxonomy   Intended Effects Journeying with someone in the grief process   Methods Offer emotional support; Offer spiritual/Religion support   Interventions [place]       Discussion: Notified by RN of hospice pt death.  provided prayer of commendation at pt's bedside; spoke with pt's sister Dianne Douglas over the phone to offer support and complete Release Log via verbal authorization; delivered Release Log to Public Safety per protocol and returned belongings sheet to [facility].      Colby Walker, [address] [place] [address] Resident  [id number]

## 2023-02-25 NOTE — DISCHARGE SUMMARY
[facility]  Discharge Summary / Death Note     Barbara Hummel  : 1940     Date of Admission:  2023  Date of Death:  2023     Death Diagnoses:     AECOPD  Pneumonia  Acute on chronic respiratory failure  CAD  Metastatic lung cancer  CAD  Ischemic CM  POPPY on CKD      History of Present Illness:     Barbara Hummel was a(n) 81year old male with a h/o CAD, ischemic CM,CKD who presented from rehab with cardiopulmonary arrest thought to be primarily related to pneumonia and severe COPD. He was initially intubated (despite being DNR). His ET tube became dislodged at the hospital and his sister did not want him re-intubated. He has worsening respiratory distress. His sister wants to keep him comfortable and he is transitioned to inpatient hospice. Hospital Course: The patient was admitted to inpatient hospice with morphine drip titrated to comfort.  The patient  on 23      Laila Zeng MD  2023

## 2023-02-25 NOTE — PROGRESS NOTES
02/24/23 2349   Sacramental Encounters   Sacrament of Sick-Anointing Anointed;Visiting  anointed patient     Pt received Sacrament of Anointing of the Sick today from Fr. Antoinette Reyes.     Bc Saxena[address] Resident  [id number]

## 2023-02-25 NOTE — HOSPICE RN NOTE
[facility] Rounds. Residential RN assessment of patient at bedside. Lung sounds diminished throughout upon auscultation. Respirations shallow on 4L nasal cannula. Patient unresponsive to verbal and tactile stimuli, PPS 10%. Patient receiving morphine gtt 4mg/hr IV continuous with doses lasix IVP PRN and robinul IVP PRN for congestion and morphine IVP PRN by facility RN for pain, shortness of breath, and restlessness not controlled with oral medications. Update given to Lala Hurley RN who is in agreement with plan of care and will pass on to receiving RN to continue to administer, monitor, and titrate IV medications as indicated. [facility] continuing to follow up to determine patient appropriateness for inpatient hospice care.      Kailyn Sharp [facility]  [phone]  Office: [phone]

## 2023-02-25 NOTE — PLAN OF CARE
Patient is lethargic, 4LO2, repositioned, oral care, canchola, morphine gtt 4mg/hr, prn medications, family at bedside.

## 2023-02-27 ENCOUNTER — TELEPHONE (OUTPATIENT)
Dept: INTERNAL MEDICINE UNIT | Facility: HOSPITAL | Age: 83
End: 2023-02-27

## 2023-02-27 NOTE — TELEPHONE ENCOUNTER
Patient  under Inpatient Hospice level of care. Public [facility] and [address] notified death certificate to be completed by [facility], [facility]. Medical Director (Dr Facundo Boyle, Phone [phone], fax [phone]) .

## 2023-02-28 ENCOUNTER — TELEPHONE (OUTPATIENT)
Dept: HEMATOLOGY/ONCOLOGY | Facility: HOSPITAL | Age: 83
End: 2023-02-28

## 2023-04-06 ENCOUNTER — APPOINTMENT (OUTPATIENT)
Dept: HEMATOLOGY/ONCOLOGY | Facility: HOSPITAL | Age: 83
End: 2023-04-06
Attending: STUDENT IN AN ORGANIZED HEALTH CARE EDUCATION/TRAINING PROGRAM
Payer: MEDICARE

## 2023-11-23 NOTE — TELEPHONE ENCOUNTER
Okay for [facility] on Tuesday at 1 PM  Hold Eliquis today  Only bronchoscopy with MAC anesthesia  Thank you Oriented - self; Oriented - place; Oriented - time

## 2024-12-25 NOTE — ED NOTES
Problem: Chronic Conditions and Co-morbidities  Goal: Patient's chronic conditions and co-morbidity symptoms are monitored and maintained or improved  12/25/2024 1051 by John Puckett RN  Outcome: Completed  12/25/2024 0010 by Susie Gray RN  Outcome: Progressing     Problem: Discharge Planning  Goal: Discharge to home or other facility with appropriate resources  12/25/2024 1051 by John Puckett RN  Outcome: Completed  12/25/2024 0010 by Susie Gray RN  Outcome: Progressing     Problem: Safety - Adult  Goal: Free from fall injury  12/25/2024 1051 by John Puckett RN  Outcome: Completed  12/25/2024 0010 by Susie Gray RN  Outcome: Progressing     Problem: Pain  Goal: Verbalizes/displays adequate comfort level or baseline comfort level  12/25/2024 1051 by John Puckett RN  Outcome: Completed  12/25/2024 0010 by Susie Gray RN  Outcome: Progressing     Problem: Skin/Tissue Integrity  Goal: Absence of new skin breakdown  Description: 1.  Monitor for areas of redness and/or skin breakdown  2.  Assess vascular access sites hourly  3.  Every 4-6 hours minimum:  Change oxygen saturation probe site  4.  Every 4-6 hours:  If on nasal continuous positive airway pressure, respiratory therapy assess nares and determine need for appliance change or resting period.  12/25/2024 1051 by John Puckett RN  Outcome: Completed  12/25/2024 0010 by Susie Gray RN  Outcome: Progressing     Problem: Musculoskeletal - Adult  Goal: Return mobility to safest level of function  Outcome: Completed     Problem: Infection - Adult  Goal: Absence of infection at discharge  Outcome: Completed      pts sister at bedside.

## (undated) DIAGNOSIS — Z01.818 PREOP TESTING: ICD-10-CM

## (undated) DIAGNOSIS — Z20.822 COVID-19 RULED OUT: ICD-10-CM

## (undated) DIAGNOSIS — C34.91 SQUAMOUS CELL CARCINOMA OF RIGHT LUNG (HCC): Primary | ICD-10-CM

## (undated) DIAGNOSIS — R91.8 LUNG MASS: Primary | ICD-10-CM

## (undated) DEVICE — SINGLE USE BIOPSY VALVE MAJ-210: Brand: SINGLE USE BIOPSY VALVE (STERILE)

## (undated) DEVICE — FORCEPS BX 100CM 1.8MM RJ STD

## (undated) DEVICE — AIRLIFE™ MISTY FAST™ SMALL VOLUME NEBULIZER WITH 7 FOOT (2.1 M) CRUSH RESISTANT OXYGEN TUBING, BAFFLED MOUTHPIECE, AND 6 INCH (15 CM) FLEXTUBE: Brand: AIRLIFE™

## (undated) DEVICE — SINGLE USE SUCTION VALVE MAJ-209: Brand: SINGLE USE SUCTION VALVE (STERILE)

## (undated) DEVICE — 6 ML SYRINGE LUER-LOCK TIP: Brand: MONOJECT

## (undated) DEVICE — LINE MNTR ADLT SET O2 INTMD

## (undated) DEVICE — KIT CLEAN ENDOKIT 1.1OZ GOWNX2

## (undated) DEVICE — YANKAUER SUCTION INSTRUMENT NO CONTROL VENT, BULB TIP, CLEAR: Brand: YANKAUER

## (undated) DEVICE — MEDI-VAC NON-CONDUCTIVE SUCTION TUBING: Brand: CARDINAL HEALTH

## (undated) DEVICE — MASK PROC MASK SOFT WHITE

## (undated) DEVICE — 35 ML SYRINGE REGULAR TIP: Brand: MONOJECT

## (undated) DEVICE — CONTAINER CLIKSEAL PP 4OZ BLU

## (undated) DEVICE — SYRINGE 10ML SLIP TIP

## (undated) DEVICE — CONMED SCOPE SAVER BITE BLOCK, 20X27 MM: Brand: SCOPE SAVER

## (undated) DEVICE — 3 ML SYRINGE LUER-LOCK TIP: Brand: MONOJECT

## (undated) NOTE — LETTER
Hospital Discharge Documentation  Please phone to schedule a hospital follow up appointment.     From: [address] Hospitalist's Office  Phone: [phone]    Patient discharged time/date: 11/29/2018  4:00 PM  Patient discharge disposition:  Acute Care   GLU 84 11/29/2018     CA 8.5 11/29/2018     MG 1.5 11/29/2018            Imaging: Reviewed            Meds:      Current Facility-Administered Medications:  predniSONE (DELTASONE) tab 30 mg 30 mg Oral Daily with breakfast   Amoxicillin-Pot Clavulanate (A PEG 3350 (MIRALAX) powder packet 17 g 17 g Oral Daily PRN   bisacodyl (DULCOLAX) rectal suppository 10 mg 10 mg Rectal Daily PRN   morphINE sulfate (PF) 2 MG/ML injection 1 mg 1 mg Intravenous Q2H PRN   Or         morphINE sulfate (PF) 2 MG/ML injection 2 Routing history could not be found for this note. This is because the note has never been routed or because communication record creation was suppressed.

## (undated) NOTE — LETTER
To who may concern. This letter is to inform you that Asiyachrsita Jovanny has been attending Physical Therapy with me and has been recently discharged from my care.       He is not considered an increased risk of falling based on functional outcome measures i

## (undated) NOTE — LETTER
Hospital Discharge Documentation  Patient was discharged to [facility]. From: [address] Hospitalist's Office  Phone: [phone]    Patient discharged time/date: 2/2/2023  2:30 PM  Patient discharge disposition:  SNF       Discharge Summary - D/C Summary      Discharge Summary signed by Willian Fam MD at 2/3/2023  8:25 AM   Version 1 of 1    Author: Willian Fam MD Service: Hospitalist Author Type: Physician    Filed: 2/3/2023  8:25 AM Status: Signed    : Willian Fam MD (Physician)         [facility]    PATIENT'S NAME: Portia Contreras   ATTENDING PHYSICIAN: Laila Penn MD   PATIENT ACCOUNT#:   [id number]    LOCATION:  [address] [place] [address] #:   [id number]       YOB: 1940  ADMISSION DATE:       01/31/2023      DISCHARGE DATE:  02/02/2023    DISCHARGE SUMMARY      About 35 minutes were spent preparing this discharge. Discussed with patient's family in the room with the patient's permission, case management and preparing the discharge. DISCHARGE DIAGNOSIS:  Hypoglycemia, perhaps from inadvertent administration of antihyperglycemic medications intended for another patient. HISTORY AND HOSPITAL COURSE:  This is a very pleasant 81-year-old white male well known to me from previous hospitalizations who presents with a history of having a low blood sugar. He came to the hospital with a blood sugar in the 30s, transferred from, I believe, a subacute rehab facility. The patient lives at [facility]. He may or may not have received another patient's medication which lowered his blood sugar. Sulfonylurea screen pending. He improved and he was discharged to assisted living at [facility] after much work by Case Management and his sugars remained normal and he was alert, bright and looked well.     PHYSICAL EXAMINATION:    VITAL SIGNS:  On discharge temperature is 97.9, pulse is 74, respiratory rate 20, blood pressure is 138/61, 97% on 2L which is chronic for him. LUNGS:  Occasional rhonchi. HEART:  Normal S1, S2. No S3. There is soft murmur, systolic. ABDOMEN:  Soft, nontender. EXTREMITIES:  No significant edema. NEUROLOGIC:  He is alert, oriented, friendly, and cooperative. LABORATORY STUDIES:  Please see chart. ASSESSMENT AND PLAN:    1. Hypoglycemia, perhaps from inadvertent medications being administered. Hopefully, he will do well. His medications will be handled by a [facility]. This was confirmed by the representative in the room with the family at the time I walked in.  2.   Lung cancer as per Dr. Angel Luis Verdugo and Dr. Claudia Donaldson. 3.   Chronic obstructive pulmonary disease. 4.   Chronic kidney disease stage 3.  5.   Anemia of chronic kidney disease and chronic neoplastic disease. 6.   Chronic hypoxemic respiratory failure, on oxygen. CONDITION ON DISCHARGE:  Stable for [facility] [employer]. CODE STATUS:  DNAR Select. ACTIVITY:  As tolerated. DIET:  As tolerated. FOLLOWUP:  With Dr. Tomasa Rodriguez or his designate 1 to 2 days with labs at their discretion, Dr. Angel Luis Verdugo in 1 month, rehab physician 1 to 2 days. DISCHARGE MEDICATIONS:    1.   Vitamin D 2000 units daily. 2.   Tylenol 500 mg every 6 hours as needed. 3.   Atorvastatin 40 mg nightly. Watch for muscle weakness. 4.   Tessalon Perles 200 mg 3 times a day. 5.   Dulcolax suppository daily as needed. 6.   Colace 100 mg p.o. b.i.d. p.r.n. constipation. 7.   Robitussin 10 mL every 4 hours as needed for cough. 8.   DuoNeb every 4 hours as needed. 9.   Melatonin 1 mg nightly. 10.   Omeprazole 40 mg daily. 11.   MiraLAX 17 g daily. 12.   ProAir 2 puffs every 4 hours as needed. 13.   Trelegy Ellipta 1 puff daily. 14.   B12 at 1000 mcg daily. RISK OF READMISSION:  Moderate. TCM followup is recommended. Dictated By Jeannine Fagan.  MD Fili  d: 02/02/2023 16:50:49  t: 02/02/2023 19:44:25  Job [id number]  [facility]/    Electronically signed by Sherly Gonzalez MD on 2/3/2023  8:25 AM

## (undated) NOTE — LETTER
[address], [place]  Authorization for Invasive Procedures  1. I hereby authorize Francesca Jordan , my physician and whomever may be designated as the doctor's assistant, to perform the following operation and/or procedure:  Bronchoscopy on Patricio Clancy at [facility].    2. My physician has explained to me the nature and purpose of the operation or other procedure, possible alternative methods of treatment, the risks involved and the possibility of complications to me. I understand the probable consequences of declining the recommended procedure and the alternative methods of treatment. I acknowledge that no guarantee has been made as to the result that may be obtained. 3. I recognize that during the course of this operation or other procedure, unforeseen conditions may necessitate additional or different procedures than those listed above. I, therefore, further authorize and request that the above-named physician, his/her physician assistants, or designees perform such procedures as are, in his/her professional opinion, necessary and desirable. If I have a Do Not Attempt Resuscitation (DNAR) order in place, that status will be suspended while in the operating room, procedural suite, and during the recovery period unless otherwise explicitly stated by me (or a person authorized to consent on my behalf). The surgeon or my attending physician will determine when the applicable recovery period ends for purposes of reinstating the DNAR order. 4. Should the need arise during my operation or immediate post-operative period; I also consent to the administration of blood and/or blood products.  Further, I understand that despite careful testing and screening of blood and blood products, I may still be subject to ill effects as a result of recieving a blood transfusion an/or blood producst. The following are some, but not all, of the potential risks that can occur: fever and allergic reactions, hemolytic reactions, transmission of disease such as hepatitis, AIDS, cytomegalovirus (CMV), and flluid overload. In the event that I wish to have autologous transfusions of my own blood, or a directed donor transfusion, I will discuss this with my physician. 5. I consent to the photographing of the operations or procedures to be performed for the purposes of advancing medicine, science, and/or education, provided my identity is not revealed. If the procedure has been videotaped, the physician/surgeon will obtain the original videotape. The hospital will not be responsible for storage or maintenance of this tape. 6. I consent to the presence of a  or observer as deemed necessary by my physician or his designee. 7. Any tissues or organs removed in the operation or other procedure may be disposed of by and at the discretion of [facility].    8. I understand that the physician and his/her physician assistants may not be employees or agents of [facility], [facility], [address], but are independent medical practitioners who have been permitted to use its facilities for the care and treatment of their patients. 9. Patients having a sterilization procedure: I understand that if the procedure is successful the results will be permanent and it will therefore be impossible for me to inseminate, conceive or bear children. I also understand that the procedure is intended to result in sterility, although the result has not been guaranteed. 10. I CERTIFY THAT I HAVE READ AND FULLY UNDERSTAND THE ABOVE CONSENT TO OPERATION and/or OTHER PROCEDURE. 11. I acknowledge that my physician has explained sedation/analgesia administration to me including the risks and benefits. I consent to the administration of sedation/analgesia as may be necessary or desirable in the judgment of my physician.      Signature of Patient:  ________________________________________________ Date: _________Time: _________    Responsible person in case of minor or unconscious: _____________________________Relationship: ____________     Witness Signature: ____________________________________________ Date: __________ Time: ___________    Statement of Physician  My signature below affirms that prior to the time of the procedure, I have explained to the patient and/or his legal representative, the risks and benefits involved in the proposed treatment and any reasonable alternative to the proposed treatment. I have also explained the risks and benefits involved in the refusal of the proposed treatment and have answered the patient's questions. If I have a significant financial interest in this procedure/surgery, I have disclosed this and had a discussion with my patient.     Signature of Physician:   ________________________________________Date: _________Time:_______ Patient Name: Uriel Lambert  : 1940   Printed: 2022    Medical Record #: [id number]

## (undated) NOTE — IP AVS SNAPSHOT
[facility]            (For Outpatient Use Only) Initial Admit Date: 11/19/2018   Inpt/Obs Admit Date: Inpt: 11/19/18 / Obs: N/A   Discharge Date:    Mahendra Guzman:  [de-identified]   MRN: [de-identified]   CSN: [id number]        ENCOUNTER  Patient Cla

## (undated) NOTE — IP AVS SNAPSHOT
[facility]            (For Outpatient Use Only) Initial Admit Date: 2022   Inpt/Obs Admit Date: Inpt: 22 / Obs: N/A   Discharge Date:    Aline Dahl:  [de-identified]   MRN: [de-identified]   CSN: [id number]   CEID: [id number]        ENCOUNTER  Patient Class: Inpatient Admitting Provider: Alesia Davenport MD Unit: [address] [place] [address]/   Hospital Service: Medical Attending Provider: Jayant Morrow MD   Bed: 541-A   Visit Type:   Referring Physician: No ref. provider found Billing Flag:    Admit Diagnosis: Weakness generalized [R53.1]      PATIENT  Legal Name:   Afua Jennings   Legal Sex: Male  Gender ID:              Pref Name:    PCP:  Tian Art MD Home: [phone]   Address:  [address] : 1940 (82 yrs) Mobile: [phone]         City/State/Zip: [place] [address] Marital: Single Language: Garrick January: Rekha Ugarte SSN4: [id number] Taoism: [address] *     Race: White Ethnicity: Non  Or [address] CONTACT   Name Relationship Legal Guardian? Home Phone Work Phone Mobile Phone   1. Delia Ziegler  2.  Mat Ziegler Sister  Brother   No [phone]     GUARANTOR  Guarantor: Mely Viet : 1940 Home Phone: [phone]   Address: [address]  Sex: Male Work Phone: [phone]   City/State/Zip: [place] [address]   Rel. to Patient: Self Guarantor ID: [id number]   GUARANTOR EMPLOYER   Employer:  Status: RETIRED     COVERAGE  PRIMARY INSURANCE   Payor: MEDICARE Plan: MEDICARE PART A&B   Group Number: [id number] Insurance Type: [id number] Name: Mark Gracia : 1940   Subscriber ID: [id number] Pt Rel to Subscriber: Self   SECONDARY INSURANCE   Payor:  Plan:    Group Number:  Insurance Type:    Subscriber Name:  Subscriber :    Subscriber ID:  Pt Rel to Subscriber:    TERTIARY INSURANCE   Payor:  Plan:    Group Number:  Insurance Type:    Subscriber Name:  Subscriber : Subscriber ID:  Pt Rel to Subscriber:    Hospital Account Financial Class: Medicare    December 23, 2022

## (undated) NOTE — LETTER
[facility]  Administration of Anesthesia  1. I, Alejandra Bennett, or _________________________________ acting on his behalf, (Patient) (Dependent/Representative) request to receive anesthesia for my pending procedure/operation/treatment. A physician (anesthesiologist) alone or an anesthesiologist working with a nurse anesthetist may administer my anesthesia. 2. I understand that my anesthesiologist is not an employee or agent of the hospital, but is an independent medical practitioner who has been permitted to use its facilities for the care and treatment of his/her patients. 3. I acknowledge that a physician from [facility] or their designate(s), recommended anesthesia for me using her/his medical judgment. The type(s) of anesthesia I may receive include:                a) General Anesthesia, b) Spinal/Epidural Anesthesia, c) Regional Anesthesia or d) Monitored Anesthesia Care. 4. If my spinal, regional or monitored anesthesia care (local) is not satisfactory for my comfort, or if my medical condition requires, I consent to the administration of general anesthesia. 5. I am aware that the practice of anesthesiology is not an exact science and that some foreseeable risks or consequences may occur. Some common risks/consequences include sore throat and hoarseness, nausea and vomiting, muscle soreness, backache, damage to the mouth/teeth/vocal cords and eye injury. I understand that more rare but serious potential risks of anesthesia include blood pressure changes, drug reactions, cardiac arrest, brain damage, paralysis or death. These risks apply to whether I have general, spinal/epidural, regional or monitored anesthesia care. 6. OBSTETRIC PATIENTS: Specific risks/consequences of spinal/epidural anesthesia may include itching, low blood pressure, difficulty urinating, slowing of the baby's heart rate and headache.  Rare risks include infections, high spinal block, spinal bleeding, seizure, cardiac arrest and death. 7. AWARENESS: I understand that it is possible (but unlikely) to have explicit memory of events from the operating room while under general anesthesia. 8. ELECTROCONVULSIVE THERAPY PATIENTS: This consent serves for all treatments in a single course of therapy. 9. I understand that I must inform my anesthesiologist when I last ate and/or drank to minimize the risk of anesthesia. 10. If I am pregnant, or may pregnant, I understand that elective surgery should be postponed until after the baby is born. Anesthetics cross the placenta and may temporarily anesthetize the baby. Although fetal complications of anesthesia during pregnancy are rare, they may include birth defects, premature labor, brain damage and death. 11. I certify that I informed the anesthesiologist, to the best of my ability, about medication I take including blood thinners, anticoagulants, herbal remedies, narcotics and recreational drugs (e.g. cocaine, marijuana, PCP). Failure to inform my anesthesiologist about these medicines may increase my risk of anesthetic complications. The nature and purpose of my anesthetic management was explained to me. I had the opportunity to ask questions and the answers and information provided meet my satisfaction.   I retain the right to withdraw this consent at any time prior to the administration of said anesthetic.    ___________________________________________________           _____________________________________________________  Patient Signature                                                                                      Witness Signature                ___________________________________________________           _____________________________________________________  Date/Time                                                                                               Responsible person in case of minor/ unconscious pt /Relationship    My signature below affirms that prior to the time of the procedure, I have explained to the patient and/or his/her guardian, the risks and benefits of undergoing anesthesia, as well as any reasonable alternatives.     ___________________________________________________            _____________________________________________________  Physician Signature                            Date/Time  Patient Name: Inés Pierre     : 1940     Printed: 2022      Medical Record #: [id number]                              Page 1 of 1    ----------ANESTHESIA CONSENT----------

## (undated) NOTE — IP AVS SNAPSHOT
Patient Demographics     Address  [place] [address] Phone  [phone] [facility])  [phone] (Work)      Emergency Contact(s)     Name Relation Home Work Mobile    Delia Ziegler Sister [phone]        Allergies as of 11/29/2018  Reviewed o Insulin Aspart Pen 100 UNIT/ML Sopn  Commonly known as:  Nick Fournier  Next dose due:  11/29/2018 in the evening  Notes to patient:  Give 1 unit for blood glucose 160-200 mg/dL  Give 2 units for blood glucose 201-240 mg/dL  Give 3 units for blood glucose 241-28 ** SITE UNKNOWN **     Order ID Medication Name Action Time Action Reason Comments    [id number] Amoxicillin-Pot Clavulanate (AUGMENTIN) 875-125 MG tab 1 tablet 11/28/18 2220 Given      [id number] Amoxicillin-Pot Clavulanate (AUGMENTIN) 875-125 MG tab 1 tab Temp  98 °F (36.7 °C) Filed at 11/29/2018 0902   SpO2  97 % Filed at 11/29/2018 1142      Patient's Most Recent Weight       Most Recent Value   Patient Weight  99.8 kg (220 lb 1.6 oz)      CPAP Settings (Inpatient)       Most Recent Value   Mode  Spontane Anion Gap 8 0 - 18 mmol/L — [facility]   Calculated Osmolality 303 275 - 295 mOsm/kg [facility]   GFR, Non-African American >60 >=60 — [facility]   GFR, African-American >60 >=60 [employer]   Comment:           Estimated GFR units: mL/min/1.73 Occult Blood Positive    Sputum culture Once [[id number]] Collected:  11/20/18 1134    Order Status:  Completed Lab Status:  Final result Updated:  11/22/18 0648    Specimen:  Other from Sputum      Sputum Culture Result No Growth 2 Days     Sputum Smear ATTENDING PHYSICIAN: Ondina Bowers MD   PATIENT ACCOUNT#:   [id number]    LOCATION:  [place] [address]  MEDICAL RECORD #:   [id number]       YOB: 1940  ADMISSION DATE:       11/19/2018    HISTORY AND PHYSICAL EXAMINATION    CHIEF Jesus López SOCIAL HISTORY:  Extensive tobacco use by history. Lives by himself. No alcohol or drug use. Usually independent in his basic activities of daily living. REVIEW OF SYSTEMS:  The patient is a very poor historian, very drowsy.   He has been short of giovanni renal insufficiency. Monitor his kidney function closely. Further recommendations to follow.     Dictated By Nguyen Noble MD  d: 11/19/2018 13:26:34  t: 11/19/2018 13:49:37  Job [id number]  FB/  [FB.1]  Electronically signed by Ceferino Fraire have been closely following this patient for his complex medical conditions. ROS:  Positive for bowel movement yesterday. No chest pain, no shortness of breat,h no nausea no vomiting. He uses oxygen at nighttime. . Otherwise a full 10 point review of s [COMPLETED] sodium chloride 0.9 % infusion      Insulin Aspart Pen (NOVOLOG) 100 UNIT/ML flexpen 1-5 Units 1-5 Units Subcutaneous TID CC   [COMPLETED] sodium chloride 0.9 % infusion      [COMPLETED] Magnesium Sulfate IVPB premix SOLN 2 g 2 g Intravenous On Normal Saline Flush 0.9 % injection 3 mL 3 mL Intravenous PRN   Heparin Sodium (Porcine) 5000 UNIT/ML injection 5,000 Units 5,000 Units Subcutaneous 2 times per day   acetaminophen (TYLENOL) tab 650 mg 650 mg Oral Q6H PRN   ondansetron HCl (ZOFRAN) injecti Years since quittin.7      Smokeless tobacco: Never Used    Substance and Sexual Activity      Alcohol use: No        Alcohol/week: 0.0 oz      Drug use: No      FUNCTIONAL STATUS:  Premorbid functional status/Living Situation-  HOME SITUATION  T Neck: supple, symmetrical, no thyromegaly appreciated  Lymph: no cervical and no axillary lymphadenopathy  Lungs: Non labored on 2 L of oxygen via nasal cannula, no wheezing appreciated, No accessory muscle noted.   Diminished breath sounds diffusely  Heart 14-17 day inpatient rehab stay. Anticipated goals are to improve from mild to max assist to modified independent level to facilitate safe discharge to home.  Acute inpatient rehab will provide an intensive level of daily therapy within an interdisciplinary E: [id number] Study date: Nov 19 2018 6:42PM               [facility] Access[facility]: [id number] HT:(74in) WT:(249.5lb)                       BP: 106 / 58 ------------------------------------------------------------------- Indicat compliance, and patient on ventilator. Scanning was performed from the parasternal, apical, and subcostal acoustic windows. Study completion:  The patient tolerated the procedure well. There were no complications. Transthoracic echocardiography.   M-mode, Doppler: There was no evidence for stenosis. Mild-moderate regurgitation. Right atrium:  The atrium was dilated. Pacer wire or catheter noted in right atrium. Pericardium:  There was no pericardial effusion.  Quality of study:  Image quality was subopt 3.8   cm     <4.5   Left atrium                                 Value        Reference  LA ID, A-P, ES                      (H)     4.3   cm     3.0 - 4.0   Mitral valve                                Value        Reference  Mitral E-wave peak velocity COPD exacerbation (HCC)    Heart failure, unspecified HF chronicity, unspecified heart failure type (HCC)    POPPY (acute kidney injury) ([facility] [address]    Pneumonitis    Hypercapnia      ASSESSMENT   Increased gait distance this date with less O2 demand.  Pt ambulat BP Method: Automatic  Patient Position: Lying    O2 WALK        SPO2 Ambulation on Oxygen: 86  Ambulation oxygen flow (liters per minute): 2      AM-PAC '6-Clicks' INPATIENT SHORT FORM - BASIC MOBILITY  How much difficulty does the patient currently have. Zachariah Pearce Goal #4 Patient will negotiate 5 stairs/one curb w/ assistive device and supervision   Goal #4   Current Status NT   Goal #5 Patient to demonstrate home activity/exercise instructions provided to patient in preparation for discharge, with SV.     Goal #5 mechanics; Coordination; Endurance; Energy conservation;Patient education; Family education;Gait training;Neuromuscular re-educate;Range of motion;Strengthening;Stoop training;Stair training;Transfer training;Balance training    SUBJECTIVE  Pt likes to watch s CURRENT GOALS     Goals to be met by: 12/8/18  Patient Goal Patient's self-stated goal is: pt did not state goal    Goal #1 Patient is able to demonstrate supine - sit EOB @ level: supervision      Goal #1   Current Status SBA w/ HOB elevated   Goal #2 Karen Winkler CT brain on 11/24 demonstrated the following: \"No acute intracranial process by noncontrast CT technique. \"    Consulted w/ RN prior to seeing pt for PT/OT co-treatment session. Pt was received supine in bed.  Pt needed Mod A x[ST.1] 1[ST.2] for supine to Dynamic [place] [facility] '6-Clicks' INPATIENT SHORT FORM - BASIC MOBILITY  How much difficulty does the patient currently have. ..  -   Turning over in bed (including adjusting bedclothes, sheets and blankets)?: A Lot   -   Sitting down on and s Goal #5 Patient to demonstrate home activity/exercise instructions provided to patient in preparation for discharge, with SV. Goal #5   Current Status IN PROGRESS   Goal #6     Goal #6  Current Status      [ST.1]             Attribution Key    ST. 1 - Th example, pt needed repeated hand over hand cueing for hand placement on RW and chair arm rest during transfers. Neurology is not on case as of this time. RN will call hospitalist to discuss neurology consult.      At baseline, pt lives home alone and i PT Approx Degree of Impairment Score: 68.66%   Standardized Score (AM-PAC Scale): 35.33   CMS Modifier (G-Code): CL    FUNCTIONAL ABILITY STATUS  Gait Assessment   Gait Assistance: (Mod A w/ close chair follow)  Distance (ft): 7  Assistive Device: Rolling :  Samantha Wang OT (Occupational Therapist)       OCCUPATIONAL THERAPY TREATMENT NOTE - INPATIENT        Room Number: 522/437-A      Presenting Problem: (SOB, decreased o2)    Problem List  Principal Problem:    NSTEMI (non-ST elevated myocardial PLAN  OT Treatment Plan: Balance activities; Energy conservation/work simplification techniques;ADL training;Functional transfer training; Endurance training;Patient/Family education;Patient/Family training;Equipment eval/education    SUBJECTIVE  Patient is  Patient will complete OFH in sitting with CGA  Comment:cga in standing at sink     Patient will complete toileting with CGA  Comment: mod a               Goals  on:   Frequency: 5x a week    Tomasa Monahan MOT/R  Occupational Therapy   Daly functional transfers with Min A and continued cues to sequence hand placement with transitional movements. Pt ambulated 5ft + 10ft + 10ft with CGA, RW, slowed pace.  Pt benefited from seated rest breaks between bouts of ambulation and verbal cues for RW use Approx Degree of Impairment: 56.46%  Standardized Score (AM-PAC Scale): 34.69  CMS Modifier (G-Code): CK    FUNCTIONAL TRANSFER ASSESSMENT  Supine to Sit : Moderate assistance  Sit to Stand:  Moderate assistance(x2)  Chair Transfer: Min A    Bedroom Mobilit with physical therapy. CT brain on 11/24 demonstrated the following: \"No acute intracranial process by noncontrast CT technique. \"    Upon arrival, pt supine in bed and agreeable to activity. Pt with flat affect throughout session. Pt oriented x 3.  Pt req AM-PAC ‘6-Clicks’ Inpatient Daily Activity Short Form  How much help from another person does the patient currently need…[KH.1]  -   Putting on and taking off regular lower body clothing?: A Lot  -   Bathing (including washing, rinsing, drying)?: A Lot  - Filed:  11/29/2018  2:04 PM Date of Service:  11/29/2018  1:56 PM Status:  Signed    :  Gisselle Harris SLP (Speech and Language Pathologist)       SPEECH DAILY NOTE - INPATIENT    ASSESSMENT & PLAN   ASSESSMENTPt seen for swallowing therapy to Rehab.  No diet upgrade at this time. No new CXR. If patient is not discharged today, recommend continued dysphagia therapy to improve strength of swallow and airway protection followed by VFSS as warranted.       Diet Recommendations - Solids: Mechanical to increased mastication time on hard solids. Trial of thin liquids completed with cough when taking liquids from an open cup. No diet upgrade at this time.     In Progress   Goal #4 The patient will utilize compensatory strategies as outlined by  JUJU (cl

## (undated) NOTE — MR AVS SNAPSHOT
[facility]  [address] [place] [address] [phone]               Thank you for choosing us for your health care visit with Jazmine Beyer. Felisa Oviedo MD.  We are glad to serve you and happy to provide you with this summary of your visit. authorization numbers or be assured that none are required. You can then schedule your appointment. Failure to obtain required authorization numbers can create reimbursement difficulties for you.     Assoc Dx:  Pulmonary emphysema, unspecified emphysema typ Enter your [employer] Activation Code exactly as it appears below along with your Zip Code and Date of Birth to complete the sign-up process. If you do not sign up before the expiration date, you must request a new code.     Your unique [employer] Access Code: ON

## (undated) NOTE — IP AVS SNAPSHOT
[facility]            (For Outpatient Use Only) Initial Admit Date: 2023   Inpt/Obs Admit Date: Inpt: 23 / Obs: N/A   Discharge Date:    Hospital Acct:  [de-identified]   MRN: [de-identified]   CSN: [id number]   CEID: [id number]        ENCOUNTER  Patient Class: Inpatient Admitting Provider: Betsey Hickey MD [place] [address] Service: Medical Attending Provider: Tashia Hunt MD   Bed: 103-A   Visit Type:   Referring Physician: Shaniqua Penn Billing Flag:    Admit Diagnosis: Hypoglycemia [E16.2]      PATIENT  Legal Name:   [facility]   Legal Sex: Male  Gender ID: Male             [address] Name:    PCP:  Yane Whiteside MD Home: [phone]   Address:  [place] : 1940 (83 yrs) Mobile: [phone]         City/State/Zip: [facility]  Marital: Single Language: [place] geovannyDonaton Loud SSN4: [id number] Mu-ism: Qu[employer] - [place] *     Race: White Ethnicity: Non  Or [address] [place] [address]   Name Relationship Legal Guardian? Home Phone Work Phone Mobile Phone   1. Delia Ziegler  2.  Mat Ziegler Sister  Brother   No [phone]     GUARANTOR  Guarantor: Oliviaraghav René : 1940 Home Phone: [phone]   Address: [address] [facility]  Sex: Male Work Phone: [phone]   City/State/Zip: [facility] [address]   Rel. to Patient: Self Guarantor ID: [id number]   GUARANTOR EMPLOYER   Employer:  Status: RETIRED     COVERAGE  PRIMARY INSURANCE   Payor: MEDICARE Plan: MEDICARE PART A&B   Group Number: [id number] Insurance Type: [id number] Name: Susen Harada : 1940   Subscriber ID: [id number] Pt Rel to Subscriber: Self   SECONDARY INSURANCE   Payor:  Plan:    Group Number:  Insurance Type:    Subscriber Name:  Subscriber :    Subscriber ID:  Pt Rel to Subscriber:    TERTIARY INSURANCE   Payor:  Plan:    Group Number:  Insurance Type:    Subscriber Name:  Subscriber :    Subscriber ID:  Pt Rel to Subscriber:    Hospital Account Financial Class: Medicare    February 2, 2023